# Patient Record
Sex: MALE | Race: WHITE | Employment: OTHER | URBAN - METROPOLITAN AREA
[De-identification: names, ages, dates, MRNs, and addresses within clinical notes are randomized per-mention and may not be internally consistent; named-entity substitution may affect disease eponyms.]

---

## 2019-02-18 ENCOUNTER — HOSPITAL ENCOUNTER (EMERGENCY)
Age: 84
Discharge: HOME | End: 2019-02-19
Payer: MEDICARE

## 2019-02-18 ENCOUNTER — APPOINTMENT (OUTPATIENT)
Dept: GENERAL RADIOLOGY | Age: 84
End: 2019-02-18
Payer: MEDICARE

## 2019-02-18 DIAGNOSIS — I15.9 SECONDARY HYPERTENSION: Primary | ICD-10-CM

## 2019-02-18 LAB
A/G RATIO: 1.6 (ref 1.1–2.2)
ALBUMIN SERPL-MCNC: 4.1 G/DL (ref 3.4–5)
ALP BLD-CCNC: 60 U/L (ref 40–129)
ALT SERPL-CCNC: 12 U/L (ref 10–40)
ANION GAP SERPL CALCULATED.3IONS-SCNC: 11 MMOL/L (ref 3–16)
AST SERPL-CCNC: 18 U/L (ref 15–37)
BASOPHILS ABSOLUTE: 0 K/UL (ref 0–0.2)
BASOPHILS RELATIVE PERCENT: 0.4 %
BILIRUB SERPL-MCNC: 1.4 MG/DL (ref 0–1)
BILIRUBIN URINE: NEGATIVE
BLOOD, URINE: NEGATIVE
BUN BLDV-MCNC: 17 MG/DL (ref 7–20)
CALCIUM SERPL-MCNC: 9.1 MG/DL (ref 8.3–10.6)
CHLORIDE BLD-SCNC: 98 MMOL/L (ref 99–110)
CLARITY: CLEAR
CO2: 27 MMOL/L (ref 21–32)
COLOR: YELLOW
CREAT SERPL-MCNC: 1.3 MG/DL (ref 0.8–1.3)
EOSINOPHILS ABSOLUTE: 0 K/UL (ref 0–0.6)
EOSINOPHILS RELATIVE PERCENT: 0.7 %
GFR AFRICAN AMERICAN: >60
GFR NON-AFRICAN AMERICAN: 52
GLOBULIN: 2.5 G/DL
GLUCOSE BLD-MCNC: 108 MG/DL (ref 70–99)
GLUCOSE URINE: NEGATIVE MG/DL
HCT VFR BLD CALC: 34.6 % (ref 40.5–52.5)
HEMOGLOBIN: 11.7 G/DL (ref 13.5–17.5)
KETONES, URINE: NEGATIVE MG/DL
LEUKOCYTE ESTERASE, URINE: NEGATIVE
LYMPHOCYTES ABSOLUTE: 1.7 K/UL (ref 1–5.1)
LYMPHOCYTES RELATIVE PERCENT: 25.9 %
MCH RBC QN AUTO: 32.5 PG (ref 26–34)
MCHC RBC AUTO-ENTMCNC: 33.7 G/DL (ref 31–36)
MCV RBC AUTO: 96.6 FL (ref 80–100)
MICROSCOPIC EXAMINATION: NORMAL
MONOCYTES ABSOLUTE: 0.6 K/UL (ref 0–1.3)
MONOCYTES RELATIVE PERCENT: 9.3 %
NEUTROPHILS ABSOLUTE: 4.1 K/UL (ref 1.7–7.7)
NEUTROPHILS RELATIVE PERCENT: 63.7 %
NITRITE, URINE: NEGATIVE
PDW BLD-RTO: 14.8 % (ref 12.4–15.4)
PH UA: 5.5
PLATELET # BLD: 133 K/UL (ref 135–450)
PMV BLD AUTO: 9 FL (ref 5–10.5)
POTASSIUM SERPL-SCNC: 3.9 MMOL/L (ref 3.5–5.1)
PROTEIN UA: NEGATIVE MG/DL
RBC # BLD: 3.58 M/UL (ref 4.2–5.9)
SODIUM BLD-SCNC: 136 MMOL/L (ref 136–145)
SPECIFIC GRAVITY UA: <=1.005
TOTAL PROTEIN: 6.6 G/DL (ref 6.4–8.2)
TROPONIN: <0.01 NG/ML
URINE TYPE: NORMAL
UROBILINOGEN, URINE: 0.2 E.U./DL
WBC # BLD: 6.4 K/UL (ref 4–11)

## 2019-02-18 PROCEDURE — 71046 X-RAY EXAM CHEST 2 VIEWS: CPT

## 2019-02-18 PROCEDURE — 85025 COMPLETE CBC W/AUTO DIFF WBC: CPT

## 2019-02-18 PROCEDURE — 99283 EMERGENCY DEPT VISIT LOW MDM: CPT

## 2019-02-18 PROCEDURE — 80053 COMPREHEN METABOLIC PANEL: CPT

## 2019-02-18 PROCEDURE — 84484 ASSAY OF TROPONIN QUANT: CPT

## 2019-02-18 PROCEDURE — 93005 ELECTROCARDIOGRAM TRACING: CPT | Performed by: PHYSICIAN ASSISTANT

## 2019-02-18 PROCEDURE — 81003 URINALYSIS AUTO W/O SCOPE: CPT

## 2019-02-18 RX ORDER — HYDRALAZINE HYDROCHLORIDE 50 MG/1
100 TABLET, FILM COATED ORAL 3 TIMES DAILY
COMMUNITY

## 2019-02-18 RX ORDER — METOPROLOL TARTRATE 50 MG/1
50 TABLET, FILM COATED ORAL 2 TIMES DAILY
COMMUNITY

## 2019-02-18 RX ORDER — ATORVASTATIN CALCIUM 80 MG/1
80 TABLET, FILM COATED ORAL DAILY
COMMUNITY

## 2019-02-18 RX ORDER — LISINOPRIL 20 MG/1
20 TABLET ORAL DAILY
COMMUNITY

## 2019-02-18 RX ORDER — TAMSULOSIN HYDROCHLORIDE 0.4 MG/1
0.4 CAPSULE ORAL DAILY
COMMUNITY

## 2019-02-18 ASSESSMENT — PAIN DESCRIPTION - PAIN TYPE: TYPE: ACUTE PAIN

## 2019-02-18 ASSESSMENT — PAIN SCALES - GENERAL: PAINLEVEL_OUTOF10: 2

## 2019-02-18 ASSESSMENT — PAIN DESCRIPTION - LOCATION: LOCATION: HEAD

## 2019-02-19 VITALS
HEIGHT: 72 IN | DIASTOLIC BLOOD PRESSURE: 71 MMHG | WEIGHT: 164 LBS | HEART RATE: 64 BPM | SYSTOLIC BLOOD PRESSURE: 164 MMHG | TEMPERATURE: 97.8 F | BODY MASS INDEX: 22.21 KG/M2 | RESPIRATION RATE: 18 BRPM | OXYGEN SATURATION: 95 %

## 2019-02-19 LAB
EKG ATRIAL RATE: 61 BPM
EKG DIAGNOSIS: NORMAL
EKG P AXIS: 76 DEGREES
EKG P-R INTERVAL: 168 MS
EKG Q-T INTERVAL: 482 MS
EKG QRS DURATION: 152 MS
EKG QTC CALCULATION (BAZETT): 485 MS
EKG R AXIS: 70 DEGREES
EKG T AXIS: 25 DEGREES
EKG VENTRICULAR RATE: 61 BPM

## 2019-02-19 PROCEDURE — 93010 ELECTROCARDIOGRAM REPORT: CPT | Performed by: INTERNAL MEDICINE

## 2020-05-07 ENCOUNTER — HOSPITAL ENCOUNTER (EMERGENCY)
Age: 85
Discharge: HOME | End: 2020-05-07
Attending: EMERGENCY MEDICINE
Payer: MEDICARE

## 2020-05-07 VITALS
OXYGEN SATURATION: 96 % | SYSTOLIC BLOOD PRESSURE: 208 MMHG | RESPIRATION RATE: 16 BRPM | HEIGHT: 72 IN | WEIGHT: 164 LBS | BODY MASS INDEX: 22.21 KG/M2 | DIASTOLIC BLOOD PRESSURE: 89 MMHG | TEMPERATURE: 97.8 F | HEART RATE: 72 BPM

## 2020-05-07 LAB
BILIRUBIN URINE: NEGATIVE
BLOOD, URINE: NEGATIVE
CLARITY: CLEAR
COLOR: YELLOW
GLUCOSE URINE: NEGATIVE MG/DL
HYALINE CASTS: ABNORMAL /LPF (ref 0–2)
KETONES, URINE: NEGATIVE MG/DL
LEUKOCYTE ESTERASE, URINE: NEGATIVE
MICROSCOPIC EXAMINATION: YES
NITRITE, URINE: NEGATIVE
PH UA: 6.5 (ref 5–8)
PROTEIN UA: ABNORMAL MG/DL
RBC UA: ABNORMAL /HPF (ref 0–4)
SPECIFIC GRAVITY UA: 1.01 (ref 1–1.03)
URINE REFLEX TO CULTURE: ABNORMAL
URINE TYPE: ABNORMAL
UROBILINOGEN, URINE: 0.2 E.U./DL
WBC UA: ABNORMAL /HPF (ref 0–5)

## 2020-05-07 PROCEDURE — 81001 URINALYSIS AUTO W/SCOPE: CPT

## 2020-05-07 PROCEDURE — 51702 INSERT TEMP BLADDER CATH: CPT

## 2020-05-07 PROCEDURE — 51798 US URINE CAPACITY MEASURE: CPT

## 2020-05-07 PROCEDURE — 99283 EMERGENCY DEPT VISIT LOW MDM: CPT

## 2020-05-07 ASSESSMENT — PAIN DESCRIPTION - PAIN TYPE: TYPE: ACUTE PAIN

## 2020-05-07 ASSESSMENT — PAIN SCALES - GENERAL: PAINLEVEL_OUTOF10: 10

## 2020-05-07 ASSESSMENT — PAIN DESCRIPTION - ORIENTATION: ORIENTATION: LOWER

## 2020-05-07 ASSESSMENT — PAIN DESCRIPTION - LOCATION: LOCATION: ABDOMEN

## 2020-05-07 NOTE — ED NOTES
--Patient provided with discharge instructions. Explained leg bag to daughter and patient. Both verbalized understanding of care. --Instructions, and follow-up appointments reviewed with patient/family. No further questions or needs at this time. --Vital signs and patient stable upon discharge. --Patient ambulatory to [facility] with daughter.          Kelsey Brigette, [address] [place] [address]  05/07/20 0960

## 2020-05-07 NOTE — ED NOTES
Completed patient ferrell catheter using sterile techniques. Patient tolerated well. Instructed patient and patients daughter on how to use leg bag for home care. Verbalized understanding.      Marcus Delcid  05/07/20 [id number]

## 2020-05-09 NOTE — ED PROVIDER NOTES
Physically abused: Not on file     Forced sexual activity: Not on file   Other Topics Concern    Not on file   Social History Narrative    Not on file     No current facility-administered medications for this encounter. Current Outpatient Medications   Medication Sig Dispense Refill    metoprolol tartrate (LOPRESSOR) 50 MG tablet Take 50 mg by mouth 2 times daily       lisinopril (PRINIVIL;ZESTRIL) 20 MG tablet Take 20 mg by mouth daily      hydrALAZINE (APRESOLINE) 50 MG tablet Take 100 mg by mouth 3 times daily       aspirin 81 MG tablet Take 81 mg by mouth daily      tamsulosin (FLOMAX) 0.4 MG capsule Take 0.4 mg by mouth daily      atorvastatin (LIPITOR) 80 MG tablet Take 80 mg by mouth daily       No Known Allergies    REVIEW OF SYSTEMS  10 systems reviewed, pertinent positives per HPI otherwise noted to be negative. PHYSICAL EXAM  BP (!) 208/89   Pulse 72   Temp 97.8 °F (36.6 °C) (Oral)   Resp 16   Ht 6' (1.829 m)   Wt 164 lb (74.4 kg)   SpO2 96%   BMI 22.24 kg/m²   GENERAL APPEARANCE: Awake and alert. Cooperative. No acute distress. HEAD: Normocephalic. Atraumatic. EYES: PERRL. EOM's grossly intact. ENT: Mucous membranes are moist.   NECK: Supple. HEART: RRR. CHEST/LUNGS: Chest atraumatic, nontender, respirations unlabored. CTAB. Good air exchange. Speaking comfortably in full sentences. BACK: No midline spinal tenderness or step-off. ABDOMEN: Soft. Non-distended. Non-tender. No guarding or rebound. Normal bowel sounds. EXTREMITIES: No peripheral edema. Moves all extremities equally. All extremities neurovascularly intact. SKIN: Warm and dry. No acute rashes. NEUROLOGICAL: Alert and oriented. CN 2-12 intact, No gross facial drooping. Strength 5/5, sensation intact. Normal coordination. Gait normal.   PSYCHIATRIC: Normal mood and affect. LABS  I have reviewed all labs for this visit.    Results for orders placed or performed during the hospital encounter of 05/07/20

## 2024-09-06 ENCOUNTER — HOSPITAL ENCOUNTER (EMERGENCY)
Age: 89
Discharge: HOME | DRG: 312 | End: 2024-09-07
Payer: OTHER GOVERNMENT

## 2024-09-06 DIAGNOSIS — R33.9 URINARY RETENTION: Primary | ICD-10-CM

## 2024-09-06 PROCEDURE — 99284 EMERGENCY DEPT VISIT MOD MDM: CPT

## 2024-09-06 PROCEDURE — 51702 INSERT TEMP BLADDER CATH: CPT

## 2024-09-06 ASSESSMENT — PAIN - FUNCTIONAL ASSESSMENT: PAIN_FUNCTIONAL_ASSESSMENT: NONE - DENIES PAIN

## 2024-09-07 VITALS
DIASTOLIC BLOOD PRESSURE: 91 MMHG | TEMPERATURE: 98 F | RESPIRATION RATE: 16 BRPM | HEART RATE: 70 BPM | WEIGHT: 150 LBS | OXYGEN SATURATION: 98 % | BODY MASS INDEX: 20.34 KG/M2 | SYSTOLIC BLOOD PRESSURE: 188 MMHG

## 2024-09-07 LAB
ALBUMIN SERPL-MCNC: 3.8 G/DL (ref 3.4–5)
ALBUMIN/GLOB SERPL: 1.3 {RATIO} (ref 1.1–2.2)
ALP SERPL-CCNC: 111 U/L (ref 40–129)
ALT SERPL-CCNC: 48 U/L (ref 10–40)
ANION GAP SERPL CALCULATED.3IONS-SCNC: 12 MMOL/L (ref 3–16)
AST SERPL-CCNC: 56 U/L (ref 15–37)
BACTERIA URNS QL MICRO: ABNORMAL /HPF
BASOPHILS # BLD: 0 K/UL (ref 0–0.2)
BASOPHILS NFR BLD: 0.5 %
BILIRUB SERPL-MCNC: 0.8 MG/DL (ref 0–1)
BILIRUB UR QL STRIP.AUTO: NEGATIVE
BUN SERPL-MCNC: 44 MG/DL (ref 7–20)
CALCIUM SERPL-MCNC: 9 MG/DL (ref 8.3–10.6)
CHLORIDE SERPL-SCNC: 105 MMOL/L (ref 99–110)
CLARITY UR: CLEAR
CO2 SERPL-SCNC: 22 MMOL/L (ref 21–32)
COLOR UR: YELLOW
CREAT SERPL-MCNC: 1.7 MG/DL (ref 0.8–1.3)
DEPRECATED RDW RBC AUTO: 16.5 % (ref 12.4–15.4)
EOSINOPHIL # BLD: 0.1 K/UL (ref 0–0.6)
EOSINOPHIL NFR BLD: 2.1 %
EPI CELLS #/AREA URNS HPF: ABNORMAL /HPF (ref 0–5)
GFR SERPLBLD CREATININE-BSD FMLA CKD-EPI: 37 ML/MIN/{1.73_M2}
GLUCOSE SERPL-MCNC: 86 MG/DL (ref 70–99)
GLUCOSE UR STRIP.AUTO-MCNC: NEGATIVE MG/DL
HCT VFR BLD AUTO: 33 % (ref 40.5–52.5)
HGB BLD-MCNC: 10.9 G/DL (ref 13.5–17.5)
HGB UR QL STRIP.AUTO: NEGATIVE
KETONES UR STRIP.AUTO-MCNC: NEGATIVE MG/DL
LEUKOCYTE ESTERASE UR QL STRIP.AUTO: NEGATIVE
LYMPHOCYTES # BLD: 1.5 K/UL (ref 1–5.1)
LYMPHOCYTES NFR BLD: 24.5 %
MCH RBC QN AUTO: 33.1 PG (ref 26–34)
MCHC RBC AUTO-ENTMCNC: 32.9 G/DL (ref 31–36)
MCV RBC AUTO: 100.4 FL (ref 80–100)
MONOCYTES # BLD: 0.6 K/UL (ref 0–1.3)
MONOCYTES NFR BLD: 9.9 %
NEUTROPHILS # BLD: 4 K/UL (ref 1.7–7.7)
NEUTROPHILS NFR BLD: 63 %
NITRITE UR QL STRIP.AUTO: NEGATIVE
PH UR STRIP.AUTO: 6 [PH] (ref 5–8)
PLATELET # BLD AUTO: 127 K/UL (ref 135–450)
PMV BLD AUTO: 9.2 FL (ref 5–10.5)
POTASSIUM SERPL-SCNC: 4.4 MMOL/L (ref 3.5–5.1)
PROT SERPL-MCNC: 6.7 G/DL (ref 6.4–8.2)
PROT UR STRIP.AUTO-MCNC: ABNORMAL MG/DL
RBC # BLD AUTO: 3.28 M/UL (ref 4.2–5.9)
RBC #/AREA URNS HPF: ABNORMAL /HPF (ref 0–4)
RENAL EPI CELLS #/AREA UR COMP ASSIST: ABNORMAL /HPF (ref 0–1)
SODIUM SERPL-SCNC: 139 MMOL/L (ref 136–145)
SP GR UR STRIP.AUTO: 1.01 (ref 1–1.03)
UA DIPSTICK W REFLEX MICRO PNL UR: YES
URN SPEC COLLECT METH UR: ABNORMAL
UROBILINOGEN UR STRIP-ACNC: 0.2 E.U./DL
WBC # BLD AUTO: 6.3 K/UL (ref 4–11)
WBC #/AREA URNS HPF: ABNORMAL /HPF (ref 0–5)

## 2024-09-07 PROCEDURE — 85025 COMPLETE CBC W/AUTO DIFF WBC: CPT

## 2024-09-07 PROCEDURE — 80053 COMPREHEN METABOLIC PANEL: CPT

## 2024-09-07 PROCEDURE — 2580000003 HC RX 258: Performed by: NURSE PRACTITIONER

## 2024-09-07 PROCEDURE — 81001 URINALYSIS AUTO W/SCOPE: CPT

## 2024-09-07 RX ORDER — 0.9 % SODIUM CHLORIDE 0.9 %
1000 INTRAVENOUS SOLUTION INTRAVENOUS ONCE
Status: COMPLETED | OUTPATIENT
Start: 2024-09-07 | End: 2024-09-07

## 2024-09-07 RX ADMIN — SODIUM CHLORIDE 1000 ML: 9 INJECTION, SOLUTION INTRAVENOUS at 00:45

## 2024-09-07 ASSESSMENT — LIFESTYLE VARIABLES
HOW MANY STANDARD DRINKS CONTAINING ALCOHOL DO YOU HAVE ON A TYPICAL DAY: PATIENT DOES NOT DRINK
HOW OFTEN DO YOU HAVE A DRINK CONTAINING ALCOHOL: NEVER

## 2024-09-08 ENCOUNTER — HOSPITAL ENCOUNTER (INPATIENT)
Age: 89
LOS: 9 days | Discharge: HOME | DRG: 312 | End: 2024-09-17
Attending: EMERGENCY MEDICINE | Admitting: INTERNAL MEDICINE
Payer: OTHER GOVERNMENT

## 2024-09-08 DIAGNOSIS — I48.91 ATRIAL FIBRILLATION, UNSPECIFIED TYPE (HCC): ICD-10-CM

## 2024-09-08 DIAGNOSIS — N28.9 RENAL INSUFFICIENCY: ICD-10-CM

## 2024-09-08 DIAGNOSIS — I95.9 HYPOTENSION, UNSPECIFIED HYPOTENSION TYPE: Primary | ICD-10-CM

## 2024-09-08 LAB
ANION GAP SERPL CALCULATED.3IONS-SCNC: 12 MMOL/L (ref 3–16)
BASOPHILS # BLD: 0 K/UL (ref 0–0.2)
BASOPHILS NFR BLD: 0.5 %
BILIRUB UR QL STRIP.AUTO: NEGATIVE
BUN SERPL-MCNC: 48 MG/DL (ref 7–20)
CALCIUM SERPL-MCNC: 8.8 MG/DL (ref 8.3–10.6)
CHLORIDE SERPL-SCNC: 105 MMOL/L (ref 99–110)
CLARITY UR: CLEAR
CO2 SERPL-SCNC: 22 MMOL/L (ref 21–32)
COLOR UR: YELLOW
CREAT SERPL-MCNC: 2 MG/DL (ref 0.8–1.3)
DEPRECATED RDW RBC AUTO: 16.7 % (ref 12.4–15.4)
EKG DIAGNOSIS: NORMAL
EKG Q-T INTERVAL: 516 MS
EKG QRS DURATION: 150 MS
EKG QTC CALCULATION (BAZETT): 484 MS
EKG R AXIS: 87 DEGREES
EKG T AXIS: 5 DEGREES
EKG VENTRICULAR RATE: 53 BPM
EOSINOPHIL # BLD: 0.1 K/UL (ref 0–0.6)
EOSINOPHIL NFR BLD: 0.9 %
EPI CELLS #/AREA URNS HPF: ABNORMAL /HPF (ref 0–5)
GFR SERPLBLD CREATININE-BSD FMLA CKD-EPI: 30 ML/MIN/{1.73_M2}
GLUCOSE SERPL-MCNC: 128 MG/DL (ref 70–99)
GLUCOSE UR STRIP.AUTO-MCNC: NEGATIVE MG/DL
HCT VFR BLD AUTO: 30.5 % (ref 40.5–52.5)
HGB BLD-MCNC: 10.2 G/DL (ref 13.5–17.5)
HGB UR QL STRIP.AUTO: ABNORMAL
KETONES UR STRIP.AUTO-MCNC: NEGATIVE MG/DL
LEUKOCYTE ESTERASE UR QL STRIP.AUTO: ABNORMAL
LYMPHOCYTES # BLD: 0.8 K/UL (ref 1–5.1)
LYMPHOCYTES NFR BLD: 11.1 %
MCH RBC QN AUTO: 33.4 PG (ref 26–34)
MCHC RBC AUTO-ENTMCNC: 33.4 G/DL (ref 31–36)
MCV RBC AUTO: 99.8 FL (ref 80–100)
MONOCYTES # BLD: 0.5 K/UL (ref 0–1.3)
MONOCYTES NFR BLD: 6.8 %
NEUTROPHILS # BLD: 6.1 K/UL (ref 1.7–7.7)
NEUTROPHILS NFR BLD: 80.7 %
NITRITE UR QL STRIP.AUTO: NEGATIVE
PH UR STRIP.AUTO: 6 [PH] (ref 5–8)
PLATELET # BLD AUTO: 125 K/UL (ref 135–450)
PMV BLD AUTO: 9.3 FL (ref 5–10.5)
POTASSIUM SERPL-SCNC: 4.6 MMOL/L (ref 3.5–5.1)
PROT UR STRIP.AUTO-MCNC: 30 MG/DL
RBC # BLD AUTO: 3.06 M/UL (ref 4.2–5.9)
RBC #/AREA URNS HPF: ABNORMAL /HPF (ref 0–4)
SODIUM SERPL-SCNC: 139 MMOL/L (ref 136–145)
SP GR UR STRIP.AUTO: 1.01 (ref 1–1.03)
UA DIPSTICK W REFLEX MICRO PNL UR: YES
URN SPEC COLLECT METH UR: ABNORMAL
UROBILINOGEN UR STRIP-ACNC: 0.2 E.U./DL
WBC # BLD AUTO: 7.6 K/UL (ref 4–11)
WBC #/AREA URNS HPF: ABNORMAL /HPF (ref 0–5)

## 2024-09-08 PROCEDURE — 99285 EMERGENCY DEPT VISIT HI MDM: CPT

## 2024-09-08 PROCEDURE — 6370000000 HC RX 637 (ALT 250 FOR IP): Performed by: INTERNAL MEDICINE

## 2024-09-08 PROCEDURE — 2580000003 HC RX 258: Performed by: EMERGENCY MEDICINE

## 2024-09-08 PROCEDURE — 80048 BASIC METABOLIC PNL TOTAL CA: CPT

## 2024-09-08 PROCEDURE — 2060000000 HC ICU INTERMEDIATE R&B

## 2024-09-08 PROCEDURE — 93005 ELECTROCARDIOGRAM TRACING: CPT | Performed by: EMERGENCY MEDICINE

## 2024-09-08 PROCEDURE — 97166 OT EVAL MOD COMPLEX 45 MIN: CPT

## 2024-09-08 PROCEDURE — 6360000002 HC RX W HCPCS: Performed by: INTERNAL MEDICINE

## 2024-09-08 PROCEDURE — 81001 URINALYSIS AUTO W/SCOPE: CPT

## 2024-09-08 PROCEDURE — 93010 ELECTROCARDIOGRAM REPORT: CPT | Performed by: INTERNAL MEDICINE

## 2024-09-08 PROCEDURE — 97530 THERAPEUTIC ACTIVITIES: CPT

## 2024-09-08 PROCEDURE — 85025 COMPLETE CBC W/AUTO DIFF WBC: CPT

## 2024-09-08 PROCEDURE — 2580000003 HC RX 258: Performed by: INTERNAL MEDICINE

## 2024-09-08 PROCEDURE — 97162 PT EVAL MOD COMPLEX 30 MIN: CPT

## 2024-09-08 RX ORDER — POLYETHYLENE GLYCOL 3350 17 G/17G
17 POWDER, FOR SOLUTION ORAL DAILY PRN
Status: DISCONTINUED | OUTPATIENT
Start: 2024-09-08 | End: 2024-09-17 | Stop reason: HOSPADM

## 2024-09-08 RX ORDER — SODIUM CHLORIDE 0.9 % (FLUSH) 0.9 %
5-40 SYRINGE (ML) INJECTION PRN
Status: DISCONTINUED | OUTPATIENT
Start: 2024-09-08 | End: 2024-09-17 | Stop reason: HOSPADM

## 2024-09-08 RX ORDER — ACETAMINOPHEN 325 MG/1
650 TABLET ORAL EVERY 6 HOURS PRN
Status: DISCONTINUED | OUTPATIENT
Start: 2024-09-08 | End: 2024-09-17 | Stop reason: HOSPADM

## 2024-09-08 RX ORDER — TAMSULOSIN HYDROCHLORIDE 0.4 MG/1
0.4 CAPSULE ORAL DAILY
Status: DISCONTINUED | OUTPATIENT
Start: 2024-09-08 | End: 2024-09-17 | Stop reason: HOSPADM

## 2024-09-08 RX ORDER — 0.9 % SODIUM CHLORIDE 0.9 %
500 INTRAVENOUS SOLUTION INTRAVENOUS ONCE
Status: COMPLETED | OUTPATIENT
Start: 2024-09-08 | End: 2024-09-08

## 2024-09-08 RX ORDER — SODIUM CHLORIDE 0.9 % (FLUSH) 0.9 %
5-40 SYRINGE (ML) INJECTION EVERY 12 HOURS SCHEDULED
Status: DISCONTINUED | OUTPATIENT
Start: 2024-09-08 | End: 2024-09-17 | Stop reason: HOSPADM

## 2024-09-08 RX ORDER — ASPIRIN 81 MG/1
81 TABLET ORAL DAILY
Status: DISCONTINUED | OUTPATIENT
Start: 2024-09-08 | End: 2024-09-17 | Stop reason: HOSPADM

## 2024-09-08 RX ORDER — SODIUM CHLORIDE 9 MG/ML
INJECTION, SOLUTION INTRAVENOUS CONTINUOUS
Status: DISCONTINUED | OUTPATIENT
Start: 2024-09-08 | End: 2024-09-09

## 2024-09-08 RX ORDER — ONDANSETRON 4 MG/1
4 TABLET, ORALLY DISINTEGRATING ORAL EVERY 8 HOURS PRN
Status: DISCONTINUED | OUTPATIENT
Start: 2024-09-08 | End: 2024-09-17 | Stop reason: HOSPADM

## 2024-09-08 RX ORDER — ATORVASTATIN CALCIUM 80 MG/1
80 TABLET, FILM COATED ORAL DAILY
Status: DISCONTINUED | OUTPATIENT
Start: 2024-09-08 | End: 2024-09-17 | Stop reason: HOSPADM

## 2024-09-08 RX ORDER — ACETAMINOPHEN 650 MG/1
650 SUPPOSITORY RECTAL EVERY 6 HOURS PRN
Status: DISCONTINUED | OUTPATIENT
Start: 2024-09-08 | End: 2024-09-17 | Stop reason: HOSPADM

## 2024-09-08 RX ORDER — SODIUM CHLORIDE 9 MG/ML
INJECTION, SOLUTION INTRAVENOUS PRN
Status: DISCONTINUED | OUTPATIENT
Start: 2024-09-08 | End: 2024-09-17 | Stop reason: HOSPADM

## 2024-09-08 RX ORDER — ENOXAPARIN SODIUM 100 MG/ML
30 INJECTION SUBCUTANEOUS DAILY
Status: DISCONTINUED | OUTPATIENT
Start: 2024-09-08 | End: 2024-09-10

## 2024-09-08 RX ORDER — ONDANSETRON 2 MG/ML
4 INJECTION INTRAMUSCULAR; INTRAVENOUS EVERY 6 HOURS PRN
Status: DISCONTINUED | OUTPATIENT
Start: 2024-09-08 | End: 2024-09-17 | Stop reason: HOSPADM

## 2024-09-08 RX ADMIN — SODIUM CHLORIDE: 9 INJECTION, SOLUTION INTRAVENOUS at 12:11

## 2024-09-08 RX ADMIN — ATORVASTATIN CALCIUM 80 MG: 80 TABLET, FILM COATED ORAL at 09:21

## 2024-09-08 RX ADMIN — SODIUM CHLORIDE 500 ML: 9 INJECTION, SOLUTION INTRAVENOUS at 04:01

## 2024-09-08 RX ADMIN — TAMSULOSIN HYDROCHLORIDE 0.4 MG: 0.4 CAPSULE ORAL at 09:21

## 2024-09-08 RX ADMIN — ASPIRIN 81 MG: 81 TABLET, COATED ORAL at 09:21

## 2024-09-08 RX ADMIN — ENOXAPARIN SODIUM 30 MG: 100 INJECTION SUBCUTANEOUS at 09:21

## 2024-09-08 RX ADMIN — SODIUM CHLORIDE, PRESERVATIVE FREE 10 ML: 5 INJECTION INTRAVENOUS at 09:28

## 2024-09-08 ASSESSMENT — PAIN SCALES - GENERAL: PAINLEVEL_OUTOF10: 0

## 2024-09-08 ASSESSMENT — PAIN - FUNCTIONAL ASSESSMENT: PAIN_FUNCTIONAL_ASSESSMENT: 0-10

## 2024-09-08 NOTE — PROGRESS NOTES
Patient admitted to room 425 from ED.  Patient oriented to room, call light, bed rails, phone, lights and bathroom. Bed locked, in lowest position, side rails up 2/4, call light within reach. Leatha Palacios RN    Patient doesn't know home medications. Daughter will be here this morning with list. Leatha Palacios RN

## 2024-09-08 NOTE — H&P
Hospital Medicine History & Physical      Date of Admission: 9/8/2024    Date of Service:  Pt seen/examined on 9/8/2024     [x]Admitted to Inpatient with expected LOS greater than two midnights due to medical therapy.  []Placed in Observation status.    Chief Admission Complaint:  Hypotension, possible AURELIO    Presenting Admission History:      89 y.o. male who presented to [facility] with weakness difficulty managing Winslow catheter, hypotension and possible AURELIO.  PMHx significant for hypertension, benign prostate hypertrophy, hyperlipidemia.  Patient lives by himself at home and has been managing well till couple of days ago when he presented to the emergency room for urinary retention required Winslow catheter placement patient was sent back home with a plan to follow-up with PCP as an outpatient and also call urology.  At that time his creatinine was mildly elevated 1.7 but the last creatinine was from 2019 which was 1.3.  Patient went home was not able to manage well with his Winslow catheter and had difficulty emptying the catheter and almost had a fall at home so decided to come to the emergency room for evaluation since he lives by himself.  Patient denied having any fever, chills, chest pain no nausea no vomiting, his creatinine was mildly elevated up to 2 from 1.7 on 9/6, However his blood pressure has been on the lower side at 91/51, also had episode of bradycardia 53, patient said he has been eating and drinking okay, he knew he is in the hospital but did not know the name, he knew it September but not the exact date.  However he knew his birthday and his current age.  He said he has 2 daughters who helps him usually and [facility] and has been improving with his strength.  Of note patient is on multiple medication at home including hydralazine Lopressor and lisinopril all may lower his blood pressure and heart rate.    Patient requested to be a full code said he has 2  We are committed to providing you the best care possible. If you receive a survey after visiting one of our offices, please take time to share your experience concerning your physician office visit. These surveys are confidential and no health information about you is shared. We are eager to improve for you and we are counting on your feedback to help make that happen. daily    Provider, MD Aleksandr   hydrALAZINE (APRESOLINE) 50 MG tablet Take 100 mg by mouth 3 times daily     Provider, MD Aleksandr   aspirin 81 MG tablet Take 81 mg by mouth daily    ProviderAleksandr MD   tamsulosin (FLOMAX) 0.4 MG capsule Take 0.4 mg by mouth daily    ProviderAleksandr MD   atorvastatin (LIPITOR) 80 MG tablet Take 80 mg by mouth daily    Provider, MD Aleksandr       Labs: Personally reviewed and interpreted for clinical significance.   Recent Labs     09/07/24  0000 09/08/24  0307   WBC 6.3 7.6   HGB 10.9* 10.2*   HCT 33.0* 30.5*   * 125*     Recent Labs     09/07/24  0000 09/08/24  0306    139   K 4.4 4.6    105   CO2 22 22   BUN 44* 48*   CREATININE 1.7* 2.0*   CALCIUM 9.0 8.8       Recent Labs     09/07/24  0000   AST 56*   ALT 48*   BILITOT 0.8   ALKPHOS 111          Mehul Parham MD

## 2024-09-08 NOTE — PROGRESS NOTES
Occupational Therapy  Facility/Department: [address]  Occupational Therapy Initial Assessment    Name: Javier De La Torre  : 1929  MRN: [id number]  Date of Service: 2024    Discharge Recommendations:  Subacute/Skilled Nursing Facility  Therapy discharge recommendations take into account each patient's current medical complexities and are subject to input/oversight from the patient's healthcare team.   Barriers to Home Discharge:     [x] Unable to transfer, ambulate, or propel wheelchair household distances without assist   [x] Limited available assist at home upon discharge      [x] Poor cognition/safety awareness for d/c to home alone       [x] Patient is at risk for falls due to: ferrell catheter     If pt is unable to be seen after this session, please let this note serve as discharge summary.  Please see case management note for discharge disposition.  Thank you.           Patient Diagnosis(es): The primary encounter diagnosis was Hypotension, unspecified hypotension type. A diagnosis of Renal insufficiency was also pertinent to this visit.  Past Medical History:  has a past medical history of Hyperlipidemia and Hypertension.  Past Surgical History:  has a past surgical history that includes Cardiac surgery (2016).           Assessment  Performance deficits / Impairments: Decreased functional mobility ;Decreased ADL status;Decreased balance;Decreased safe awareness  Assessment: pt from home alone, reports normally independent with BADL's & functional mobility with cane; readmitted for inability to care for self , especially ferrell catheter; now requiring min assist with bathroom mobility with RW for safety, decreased balance, dependent with LE self care; pt to benefit from skilled OT services;  REQUIRES OT FOLLOW-UP: Yes  Activity Tolerance  Activity Tolerance: Patient Tolerated treatment well  Activity Tolerance Comments: vitals stable on RA     Plan  Occupational Therapy Plan  Times Per Week: 3-5x/  week  Current Treatment Recommendations: Strengthening, Balance training, Functional mobility training, Safety education & training, Self-Care / ADL, Positioning    Restrictions  Restrictions/Precautions  Restrictions/Precautions: General Precautions, Fall Risk  Position Activity Restriction  Other position/activity restrictions: IV, tele, catheter,    Subjective  General  Chart Reviewed: Yes  Patient assessed for rehabilitation services?: Yes  Family / Caregiver Present: No  Referring Practitioner: Mehul Parham MD  Diagnosis: inablility to care for self especially ferrell catheter  General Comment  Comments: RN cleared pt for OT eval; pt awake in bed, agreeable to therapy  denies pain at rest.  Social/Functional History  Social/Functional History  Lives With: Alone (granddaughters assist PRN, but not availible for 24 hour.)  Type of Home: Apartment  Home Layout: One level  Home Access: Level entry  Bathroom Shower/Tub: Walk-in shower  Bathroom Toilet: Standard  Bathroom Equipment: Grab bars in shower, Grab bars around toilet  Home Equipment: Cane  Has the patient had two or more falls in the past year or any fall with injury in the past year?: Yes (fall at nursing home, fell at home as well.)  Homemaking Assistance: Needs assistance  Homemaking Responsibilities: Yes  Meal Prep Responsibility: Secondary (family prepares.  hets meals up on his own with microwave.)  Laundry Responsibility: Primary  Shopping Responsibility: No (family brings.)  Ambulation Assistance: Independent (with SPC.)  Transfer Assistance: Independent  Active : No  Patient's  Info: has a license doesnt drive.  Occupation: Retired  Type of Occupation: .    Objective  Temp: 97.8 °F (36.6 °C)  Pulse: 52  Heart Rate Source: Monitor  Respirations: 15  SpO2: 97 %  O2 Device: None (Room air)  BP: 123/66  MAP (Calculated): 85  BP Location: Right upper arm  BP Method: Automatic  Patient Position: Semi fowlers  Comment: HR 52

## 2024-09-08 NOTE — PLAN OF CARE
Pt takes metoprolol and tamsulosin twice per day. morning and bedtime. HR has been very pamela (dipped into the 30's a few times) so I'm not concerned about the metoprolol, however, pt is concerned about being able to take his tamsulosin again at bedtime.     Flomax is a once daily medication. They can increase the dose tomorrow if he is not having good response instead of doing low dose bid

## 2024-09-08 NOTE — PROGRESS NOTES
V2.0    [facility] [place]      Name:  Javier De La Torre /Age/Sex: 1929  (89 y.o. male)   MRN & CSN:  [id number] & [id number] Encounter Date/Time: 2024 8:18 AM EDT   Location:  0425/0425-01 PCP: Keyla Juan     Attending:Galdino Kidd MD       Hospital Day: 1    Assessment and Recommendations   Javier De La Torre is a 89 y.o. male with pmh of ypertension, benign prostate hypertrophy, hyperlipidemia.  who presents with Hypotension      Interval Events:   No acute events overnight.  Patient observed sitting upright eating breakfast in no distress.  Patient producing 300 mL of urine in Winslow catheter.  Reports low fluid intake in the days leading up to admission he denies any chest pain, palpitations, lightheadedness dizziness.         Plan:    Hypotension   -Admitted with blood pressures in the 90s/40s to 50s  -Home meds of lisinopril and Lopressor, hydralazine, tamsulosin  -Blood pressure stable this a.m.  Likely discharge tomorrow (), if he remains stable.      Asymptomatic bradycardia   -Lopressor on hold      Elevated creatinine with questionable acute kidney injury   -Creatinine increased from 1.7-2.0 overnight.  -Continue IV fluids    Acute urinary retention   -Patient urine output within normal limits during this admission    Hyperlipidemia   -Continue Lipitor 80 mg      Diet ADULT DIET; Regular   DVT Prophylaxis [x] Lovenox, []  Heparin, [] SCDs, [] Ambulation,  [] Eliquis, [] Xarelto  [] Coumadin   Code Status Full Code   Disposition From: Home  Expected Disposition: Home  Estimated Date of Discharge: 2024   patient requires continued admission due to    Surrogate Decision Maker/ POA           Subjective:     Chief Complaint:     Javier De La Torre is a 89 y.o. male who presents with hypotension    HPI:    89 y.o. male who presented to [facility] with weakness difficulty managing Winslow catheter, hypotension and possible AURELIO.  PMHx significant for hypertension, benign

## 2024-09-08 NOTE — ED PROVIDER NOTES
Emergency Department Provider Note  Location: [facility]  ED  9/8/2024     Patient Identification  Javier D eLa Torre is a 89 y.o. male    Chief Complaint  Issues with ADLs (Pt reports that he has trouble walking, emptying his bag and he is afraid he will fall and not be able to get up. )      Mode of Arrival  EMS    HPI  (History provided by patient)  This is a 89 y.o. male with a PMH significant for HTN, acute urinary retention status post Winslow catheter placement presented today for \" I cannot care for my catheter.\"  Patient lives alone in an apartment.  He was diagnosed with acute urinary retention and Winslow catheter from her ED less than 2 days ago.  Went home and tonight, he was trying to empty his bag when he felt like he was going to fall.  He ended up spilling the urine on his pants.  Patient is that he did not fall but afterwards he is afraid to get up.  He said he does not know how to care for the Winslow catheter and does not feel comfortable staying home.  He also does not want to go to nursing home.  He said he finally got nursing home after 6 weeks of rehab.  He denies abdominal pain.  He said he felt a little lightheaded today.  He denies room spinning sensation.  No chest pain or palpitations.  No shortness of breath.  He said he gets intermittent chronic aches and pain in his joints that is not new.    No other complaints, modifying factors or associated symptoms.      I have reviewed the following nursing documentation:  Allergies: No Known Allergies    Past medical history:  has a past medical history of Hyperlipidemia and Hypertension.    Past surgical history:  has a past surgical history that includes Cardiac surgery (2016).    Home medications:   Prior to Admission medications    Medication Sig Start Date End Date Taking? Authorizing Provider   metoprolol tartrate (LOPRESSOR) 50 MG tablet Take 50 mg by mouth 2 times daily     Provider, MD Aleksandr   lisinopril (PRINIVIL;ZESTRIL)

## 2024-09-08 NOTE — PLAN OF CARE
CMU notified me that pt HR was dipping into the mid to high 30's/low 40's. Writer checked on pt, pt asleep. Writer obtained vital signs, HR up to high 40's/low 50's. Notified Dr Kidd.   Pt is on beta blocker at home, we will continue to monitor on tele per Dr Kidd.

## 2024-09-08 NOTE — PROGRESS NOTES
Physical Therapy  Facility/Department: [facility]  Physical Therapy Initial Assessment    Name: Javier De La Torre  : 1929  MRN: [id number]  Date of Service: 2024    Discharge Recommendations:      PT Equipment Recommendations  Equipment Needed: No  Other: defer to next level of care.      Therapy discharge recommendations take into account each patient's current medical complexities and are subject to input/oversight from the patient's healthcare team.   Barriers to Home Discharge:   [] Steps to access home entry or bed/bath:   [x] Unable to transfer, ambulate, or propel wheelchair household distances without assist   [x] Unable to perform ADLs without assist   [x] Limited available assist at home upon discharge    [] Patient or family requests d/c to post-acute facility    [x] Poor cognition/safety awareness for d/c to home alone    [] Unable to maintain ordered weight bearing status    [] Patient with salient signs of long-standing immobility   [] Other: pt reports unable to care for self after catheter placement, pt at increased risk for falls.      Patient Diagnosis(es): The primary encounter diagnosis was Hypotension, unspecified hypotension type. A diagnosis of Renal insufficiency was also pertinent to this visit.  Past Medical History:  has a past medical history of Hyperlipidemia and Hypertension.  Past Surgical History:  has a past surgical history that includes Cardiac surgery (2016).    Assessment  Assessment: Pt seen in conjunction with OT to maximize pt safety and mobility and safely assess pt maximal level of mobility.  Pt presents to [facility] with primary diagnosis of hypotension as well as not being able to manage his catheter at home.  PTA pt lived at home alone with level entry, reports MOD IND with mobility with SPC.  Currently pt demonstrates grosly CGA for bed mobility but the pt did have one posterior LOB requiring MOD A to maintain balance, MIN A for functional transfers with RW although  rolling;Gait belt  Interventions: Verbal cues;Safety awareness training;Tactile cues;Manual cues  Base of Support: Widened  Speed/Alisson: Pace decreased (< 100 feet/min)  Step Length: Right shortened;Left shortened  Gait Abnormalities: Decreased step clearance;Trunk sway increased;Path deviations (pt unsteady on his feet with ambulation, path deviations and increased trunk sway noted.  has difficulty attending to cues for sequencing RW.)      Exercise Treatment: AP, QS, GS< HS 1X10 each in supine.     AM-PAC - Mobility    AM-PAC Basic Mobility - Inpatient   How much help is needed turning from your back to your side while in a flat bed without using bedrails?: A Little  How much help is needed moving from lying on your back to sitting on the side of a flat bed without using bedrails?: A Little  How much help is needed moving to and from a bed to a chair?: A Little  How much help is needed standing up from a chair using your arms?: A Little  How much help is needed walking in hospital room?: A Lot  How much help is needed climbing 3-5 steps with a railing?: Total  AM-PAC Inpatient Mobility Raw Score : 15  AM-PAC Inpatient T-Scale Score : 39.45  Mobility Inpatient CMS 0-100% Score: 57.7  Mobility Inpatient CMS G-Code Modifier : CK    Goals  Short Term Goals  Time Frame for Short Term Goals: 7 days (9/15) unless otherwise noted.  Short Term Goal 1: Pt will demonstrate CGA with bed mobility.  Short Term Goal 2: Pt will demonstrate CGA with functional transfers with LRAD with safe sequencing.  Short Term Goal 3: Pt will demonstrate CGA for ambulation up to 50' with LRAD and safe sequencing of AD.  Short Term Goal 4: Pt will participate in 4 different BLE exercises of 12-15 reps each to improve strength and endurance by 9/13.  Patient Goals   Patient Goals : \"I want to go home, I don't want to go to a nursing home unless i have to.\"     Education  Patient Education  Education Given To: Patient  Education Provided: Role

## 2024-09-08 NOTE — ED NOTES
Javier De La Torre is a 89 y.o. male admitted for  Principal Problem:    Hypotension  Resolved Problems:    * No resolved hospital problems. *  .   Patient Home via EMS transportation with   Chief Complaint   Patient presents with    Issues with ADLs     Pt reports that he has trouble walking, emptying his bag and he is afraid he will fall and not be able to get up.    .  Patient is alert and oriented x 4  Patient's baseline mobility: Baseline Mobility: Independent   Code Status: No Order   Cardiac Rhythm:       Is patient on baseline Oxygen: no how many Liters NA:   Abnormal Assessment Findings: Pt reports struggling with his ADLs since returning home with an indwelling catheter. He was noted to be bradycardic and slightly hypotensive. EKG reviewed by  Fluids administered. He was also noted to have a AURELIO on his labs.     Isolation: None      NIH Score:    C-SSRS: Risk of Suicide: No Risk  Bedside swallow:        Active LDA's:   Peripheral IV 09/08/24 Proximal;Right Forearm (Active)     Patient admitted with a ferrell: yes, yes  the ferrell is chronic was it exchanged:per pt last exchange was 09/06/24  Reason for ferrell: Urinary obstruction  Patient admitted with Central Line:  NA . PICC line placement confirmed: YES OR NO:[id number]}   Reason for Central line: NA  Was central line Inserted from an outside facility: no       Family/Caregiver Present no Any Concerns: no   Restraints no  Sitter no         Vitals: MEWS Score: 2    Vitals:    09/08/24 0238 09/08/24 0245 09/08/24 0327   BP: (!) 95/49 (!) 94/56 (!) 91/51   Pulse: 55 55 53   Resp: 16 13 18   Temp: 98.7 °F (37.1 °C)     TempSrc: Oral     SpO2: 95% 96% 92%   Weight: 68 kg (150 lb)     Height: 1.829 m (6')         Last documented pain score (0-10 scale) Pain Level: 0  Pain medication administered No.    Pertinent or High Risk Medications/Drips: No.    Pending Blood Product Administration: no    Abnormal labs:   Abnormal Labs Reviewed   URINALYSIS - Abnormal; Notable

## 2024-09-09 LAB
ALBUMIN SERPL-MCNC: 3.6 G/DL (ref 3.4–5)
ANION GAP SERPL CALCULATED.3IONS-SCNC: 11 MMOL/L (ref 3–16)
ANION GAP SERPL CALCULATED.3IONS-SCNC: 12 MMOL/L (ref 3–16)
BASOPHILS # BLD: 0 K/UL (ref 0–0.2)
BASOPHILS NFR BLD: 0.2 %
BUN SERPL-MCNC: 36 MG/DL (ref 7–20)
BUN SERPL-MCNC: 45 MG/DL (ref 7–20)
CALCIUM SERPL-MCNC: 9 MG/DL (ref 8.3–10.6)
CALCIUM SERPL-MCNC: 9 MG/DL (ref 8.3–10.6)
CHLORIDE SERPL-SCNC: 108 MMOL/L (ref 99–110)
CHLORIDE SERPL-SCNC: 109 MMOL/L (ref 99–110)
CO2 SERPL-SCNC: 20 MMOL/L (ref 21–32)
CO2 SERPL-SCNC: 22 MMOL/L (ref 21–32)
CREAT SERPL-MCNC: 1.2 MG/DL (ref 0.8–1.3)
CREAT SERPL-MCNC: 1.5 MG/DL (ref 0.8–1.3)
DEPRECATED RDW RBC AUTO: 17.2 % (ref 12.4–15.4)
EOSINOPHIL # BLD: 0.1 K/UL (ref 0–0.6)
EOSINOPHIL NFR BLD: 2.1 %
GFR SERPLBLD CREATININE-BSD FMLA CKD-EPI: 43 ML/MIN/{1.73_M2}
GFR SERPLBLD CREATININE-BSD FMLA CKD-EPI: 56 ML/MIN/{1.73_M2}
GLUCOSE SERPL-MCNC: 110 MG/DL (ref 70–99)
GLUCOSE SERPL-MCNC: 120 MG/DL (ref 70–99)
HCT VFR BLD AUTO: 33.7 % (ref 40.5–52.5)
HGB BLD-MCNC: 11.3 G/DL (ref 13.5–17.5)
LYMPHOCYTES # BLD: 1.3 K/UL (ref 1–5.1)
LYMPHOCYTES NFR BLD: 18.7 %
MCH RBC QN AUTO: 33.1 PG (ref 26–34)
MCHC RBC AUTO-ENTMCNC: 33.4 G/DL (ref 31–36)
MCV RBC AUTO: 99.1 FL (ref 80–100)
MONOCYTES # BLD: 0.6 K/UL (ref 0–1.3)
MONOCYTES NFR BLD: 9.2 %
NEUTROPHILS # BLD: 4.7 K/UL (ref 1.7–7.7)
NEUTROPHILS NFR BLD: 69.8 %
PHOSPHATE SERPL-MCNC: 2.9 MG/DL (ref 2.5–4.9)
PLATELET # BLD AUTO: 131 K/UL (ref 135–450)
PMV BLD AUTO: 9.5 FL (ref 5–10.5)
POTASSIUM SERPL-SCNC: 4 MMOL/L (ref 3.5–5.1)
POTASSIUM SERPL-SCNC: 4.1 MMOL/L (ref 3.5–5.1)
RBC # BLD AUTO: 3.41 M/UL (ref 4.2–5.9)
SODIUM SERPL-SCNC: 140 MMOL/L (ref 136–145)
SODIUM SERPL-SCNC: 142 MMOL/L (ref 136–145)
WBC # BLD AUTO: 6.7 K/UL (ref 4–11)

## 2024-09-09 PROCEDURE — 85025 COMPLETE CBC W/AUTO DIFF WBC: CPT

## 2024-09-09 PROCEDURE — 2000000000 HC ICU R&B

## 2024-09-09 PROCEDURE — 6370000000 HC RX 637 (ALT 250 FOR IP)

## 2024-09-09 PROCEDURE — 80069 RENAL FUNCTION PANEL: CPT

## 2024-09-09 PROCEDURE — 6360000002 HC RX W HCPCS: Performed by: INTERNAL MEDICINE

## 2024-09-09 PROCEDURE — 36415 COLL VENOUS BLD VENIPUNCTURE: CPT

## 2024-09-09 PROCEDURE — 2580000003 HC RX 258: Performed by: STUDENT IN AN ORGANIZED HEALTH CARE EDUCATION/TRAINING PROGRAM

## 2024-09-09 PROCEDURE — 6360000002 HC RX W HCPCS: Performed by: STUDENT IN AN ORGANIZED HEALTH CARE EDUCATION/TRAINING PROGRAM

## 2024-09-09 PROCEDURE — 6370000000 HC RX 637 (ALT 250 FOR IP): Performed by: INTERNAL MEDICINE

## 2024-09-09 PROCEDURE — 6360000002 HC RX W HCPCS: Performed by: NURSE PRACTITIONER

## 2024-09-09 RX ORDER — METOPROLOL TARTRATE 50 MG
50 TABLET ORAL 2 TIMES DAILY
Status: DISCONTINUED | OUTPATIENT
Start: 2024-09-09 | End: 2024-09-09

## 2024-09-09 RX ORDER — LISINOPRIL 20 MG/1
20 TABLET ORAL DAILY
Status: DISCONTINUED | OUTPATIENT
Start: 2024-09-09 | End: 2024-09-12

## 2024-09-09 RX ORDER — ATROPINE SULFATE 0.1 MG/ML
1 INJECTION INTRAVENOUS
Status: DISPENSED | OUTPATIENT
Start: 2024-09-09 | End: 2024-09-10

## 2024-09-09 RX ORDER — CLONIDINE HYDROCHLORIDE 0.1 MG/1
0.1 TABLET ORAL EVERY 4 HOURS PRN
Status: DISCONTINUED | OUTPATIENT
Start: 2024-09-09 | End: 2024-09-17 | Stop reason: HOSPADM

## 2024-09-09 RX ORDER — METOPROLOL TARTRATE 50 MG
50 TABLET ORAL 2 TIMES DAILY
Status: DISCONTINUED | OUTPATIENT
Start: 2024-09-09 | End: 2024-09-11

## 2024-09-09 RX ORDER — HYDRALAZINE HYDROCHLORIDE 20 MG/ML
10 INJECTION INTRAMUSCULAR; INTRAVENOUS EVERY 6 HOURS PRN
Status: DISCONTINUED | OUTPATIENT
Start: 2024-09-09 | End: 2024-09-17 | Stop reason: HOSPADM

## 2024-09-09 RX ADMIN — HYDRALAZINE HYDROCHLORIDE 10 MG: 20 INJECTION INTRAMUSCULAR; INTRAVENOUS at 16:13

## 2024-09-09 RX ADMIN — ENOXAPARIN SODIUM 30 MG: 100 INJECTION SUBCUTANEOUS at 09:37

## 2024-09-09 RX ADMIN — CLONIDINE HYDROCHLORIDE 0.1 MG: 0.1 TABLET ORAL at 17:11

## 2024-09-09 RX ADMIN — SODIUM CHLORIDE 5 MG/HR: 9 INJECTION, SOLUTION INTRAVENOUS at 19:48

## 2024-09-09 RX ADMIN — ASPIRIN 81 MG: 81 TABLET, COATED ORAL at 09:37

## 2024-09-09 RX ADMIN — ATORVASTATIN CALCIUM 80 MG: 80 TABLET, FILM COATED ORAL at 09:37

## 2024-09-09 RX ADMIN — TAMSULOSIN HYDROCHLORIDE 0.4 MG: 0.4 CAPSULE ORAL at 09:37

## 2024-09-09 RX ADMIN — HYDRALAZINE HYDROCHLORIDE 10 MG: 20 INJECTION INTRAMUSCULAR; INTRAVENOUS at 02:02

## 2024-09-09 RX ADMIN — LISINOPRIL 20 MG: 20 TABLET ORAL at 12:16

## 2024-09-09 RX ADMIN — METOPROLOL TARTRATE 50 MG: 50 TABLET, FILM COATED ORAL at 16:18

## 2024-09-09 ASSESSMENT — PAIN SCALES - GENERAL
PAINLEVEL_OUTOF10: 0

## 2024-09-09 NOTE — CARE COORDINATION
Patient confused . He has been very agitated and nurse gave PRN Hydralazine for BP of 193/90. Patient sinus. Patient has [facility] insurance. Attempted to discuss where he was at prior to admissions and he was unable to stay focused and answer questions appropriately. Unable to reach daughter phone listed as mobile is a non working number. Patient states he does not want to go back to rehab. He thinks he was at [facility]. Confirmed with Verito in admissions he was in fact there and discharged home a few weeks ago. Patient does not want to come back. He was there under Medicare because he is not service connected for SNF per Verito. Will keep trying to reach family to complete assessment.

## 2024-09-09 NOTE — PROGRESS NOTES
Occupational Therapy    Completed chart review. Upon arrival, pt stated \"I do not want to do anymore therapy. I do enough exercises at home.\" Attempted to educate pt on therapy's role in acute stay, however continued to decline any education and OOB mobility.    Will follow up as schedule allows and pt is agreeable.     Veena Trejo, OTR/L

## 2024-09-09 NOTE — ED PROVIDER NOTES
[facility]  ED  EMERGENCY DEPARTMENT ENCOUNTER        Pt Name: Javier De La Torre  MRN: [id number]  Birthdate 11/27/1929  Date of evaluation: 9/6/2024  Provider: GEENA Conner - DUSTY  PCP: Keyla Juan  Note Started: 6:09 PM EDT 9/9/24      JENNIFER. I have evaluated this patient.        CHIEF COMPLAINT       Chief Complaint   Patient presents with    Urinary Retention     States unable to urinate today, reports recent catheter placement for same complaint.        HISTORY OF PRESENT ILLNESS: 1 or more Elements     History From: the patient  Limitations to history : None    Javier De La Torre is a 89 y.o. male who presents to the ER today with complaints of urinary retention, states that he is having difficulty urinating he states that he feels like he has been going a lot and not emptying his bladder, states that it is somewhat painful when he urinates.  He is had catheter placements for the same issue in the past.  Denies any trauma, denies any fever.  He is here for further evaluation.    Nursing Notes were all reviewed and agreed with or any disagreements were addressed in the HPI.    REVIEW OF SYSTEMS :      Review of Systems    Positives and Pertinent negatives as per HPI.     SURGICAL HISTORY     Past Surgical History:   Procedure Laterality Date    CARDIAC SURGERY  2016    open heart       CURRENTMEDICATIONS       Discharge Medication List as of 9/7/2024  1:15 AM        CONTINUE these medications which have NOT CHANGED    Details   metoprolol tartrate (LOPRESSOR) 50 MG tablet Take 50 mg by mouth 2 times daily Historical Med      lisinopril (PRINIVIL;ZESTRIL) 20 MG tablet Take 20 mg by mouth dailyHistorical Med      hydrALAZINE (APRESOLINE) 50 MG tablet Take 100 mg by mouth 3 times daily Historical Med      aspirin 81 MG tablet Take 81 mg by mouth dailyHistorical Med      tamsulosin (FLOMAX) 0.4 MG capsule Take 0.4 mg by mouth dailyHistorical Med      atorvastatin (LIPITOR) 80 MG tablet

## 2024-09-09 NOTE — PROGRESS NOTES
4 Eyes Skin Assessment     NAME:  Javier De La Torre  YOB: 1929  MEDICAL RECORD NUMBER:  [id number]    The patient is being assessed for  Transfer to New Unit    I agree that at least one RN has performed a thorough Head to Toe Skin Assessment on the patient. ALL assessment sites listed below have been assessed.      Areas assessed by both nurses:    Head, Face, Ears, Shoulders, Back, Chest, Arms, Elbows, Hands, Sacrum. Buttock, Coccyx, Ischium, Legs. Feet and Heels, and Under Medical Devices         Does the Patient have a Wound? No noted wound(s)       Austin Prevention initiated by RN: Yes  Wound Care Orders initiated by RN: No    Pressure Injury (Stage 3,4, Unstageable, DTI, NWPT, and Complex wounds) if present, place Wound referral order by RN under : No    New Ostomies, if present place, Ostomy referral order under : No     Nurse 1 eSignature: Electronically signed by Belle Echavarria RN on 9/9/24 at 7:35 PM EDT    **SHARE this note so that the co-signing nurse can place an eSignature**    Nurse 2 eSignature: {Esignature:[id number]}

## 2024-09-09 NOTE — PROGRESS NOTES
Pt transferred to ICU, BP elevated to 190's, HR 70's, SR, afebrile, sp02 WNL on RA. Denies pain. Skin intact, no breakdown noted. CHG bath given. Winslow draining without difficulty. Bed alarm on. Pt oriented to room, bed control and call light explained. Belongings brought with pt upon transfer. Call light in reach. Will cont to monitor.

## 2024-09-09 NOTE — PROGRESS NOTES
Patient anxious throughout shift with frequent calls to RN requesting to verify if he is going to be sent back to prior rehab facility, stating he does not want to be sent back to that facility.    BP at 0000 193/90, hospitalist notified, order placed for PRN hydralazine. BP after hydralazine administered 159/72.    CMU notified RN patient HR dropped to 23 at 0100, Hospitalist notified, PRN order placed for Atropine at this time. Patient HR remains pamela at rest throughout the night between 40s-50s.

## 2024-09-09 NOTE — PROGRESS NOTES
V2.0    [facility] Progress Note      Name:  Javier De La Torre /Age/Sex: 1929  (89 y.o. male)   MRN & CSN:  [id number] & [id number] Encounter Date/Time: 2024 8:18 AM EDT   Location:  [facility]5/0425-01 PCP: Keyla Juan     Attending:Raoul Menendez MD       Hospital Day: 2    Assessment and Recommendations   Javier De La Torre is a 89 y.o. male with pmh of ypertension, benign prostate hypertrophy, hyperlipidemia.  who presents with Hypotension      Interval Events:   No acute events overnight.  Patient observed sitting upright eating breakfast in no distress. HTN meds held initally due to concern for hypotension. Observed with  SBP> 200  today. Restarted home metoprolol and lisinopril. PRN clonidine added.     Plan:   Hypertension   -Patient blood pressure increasing to the 200s today  -Restarted home lisinopril 20 mg, home metoprolol 50  -PRN clonidine  -HR has been stable   -Continuing to monitor blood pressure and symptoms for hypertensive emergency  -Monitor for bradycardia    Elevated creatinine with questionable acute kidney injury   -Creatinine now at 1.5, GFR increased to 43  -Will follow-up renal function panel    Acute urinary retention   -Patient urine output within normal limits during this admission    Hyperlipidemia   -Continue Lipitor 80 mg      Diet ADULT DIET; Regular   DVT Prophylaxis [x] Lovenox, []  Heparin, [] SCDs, [] Ambulation,  [] Eliquis, [] Xarelto  [] Coumadin   Code Status Full Code   Disposition From: Home  Expected Disposition: Home  Estimated Date of Discharge: 2024   patient requires continued admission due to    Surrogate Decision Maker/ POA           Subjective:     Chief Complaint:     Javier De La Torre is a 89 y.o. male who presents with hypotension    HPI:    89 y.o. male who presented to [facility] with weakness difficulty managing Winslow catheter, hypotension and possible AURELIO.  PMHx significant for hypertension, benign prostate hypertrophy, hyperlipidemia.  Patient  °C)    TempSrc:  Oral Oral    SpO2: 93% 95% 95%    Weight:       Height:             Physical Exam:      Physical Exam  Constitutional:       Appearance: He is normal weight.   HENT:      Head: Normocephalic and atraumatic.   Eyes:      Extraocular Movements: Extraocular movements intact.      Conjunctiva/sclera: Conjunctivae normal.      Pupils: Pupils are equal, round, and reactive to light.   Cardiovascular:      Rate and Rhythm: Bradycardia present.      Pulses: Normal pulses.      Heart sounds: Normal heart sounds.   Pulmonary:      Effort: Pulmonary effort is normal.      Breath sounds: Normal breath sounds.   Abdominal:      General: Abdomen is flat. Bowel sounds are normal.      Palpations: Abdomen is soft.   Neurological:      Mental Status: He is alert.              Medications:   Medications:    lisinopril  20 mg Oral Daily    aspirin  81 mg Oral Daily    atorvastatin  80 mg Oral Daily    tamsulosin  0.4 mg Oral Daily    sodium chloride flush  5-40 mL IntraVENous 2 times per day    enoxaparin  30 mg SubCUTAneous Daily      Infusions:    sodium chloride       PRN Meds: hydrALAZINE, 10 mg, Q6H PRN  atropine, 1 mg, Once PRN  sodium chloride flush, 5-40 mL, PRN  sodium chloride, , PRN  ondansetron, 4 mg, Q8H PRN   Or  ondansetron, 4 mg, Q6H PRN  polyethylene glycol, 17 g, Daily PRN  acetaminophen, 650 mg, Q6H PRN   Or  acetaminophen, 650 mg, Q6H PRN        Labs and Imaging   No results found.    CBC:   Recent Labs     09/07/24  0000 09/08/24  0307 09/09/24  0502   WBC 6.3 7.6 6.7   HGB 10.9* 10.2* 11.3*   * 125* 131*     BMP:    Recent Labs     09/07/24  0000 09/08/24  0306 09/09/24  0502    139 142   K 4.4 4.6 4.0    105 109   CO2 22 22 22   BUN 44* 48* 45*   CREATININE 1.7* 2.0* 1.5*   GLUCOSE 86 128* 110*     Hepatic:   Recent Labs     09/07/24  0000   AST 56*   ALT 48*   BILITOT 0.8   ALKPHOS 111       UA:  Lab Results   Component Value Date/Time    NITRU Negative 09/08/2024 03:07 AM

## 2024-09-10 LAB
ALBUMIN SERPL-MCNC: 3.5 G/DL (ref 3.4–5)
ANION GAP SERPL CALCULATED.3IONS-SCNC: 11 MMOL/L (ref 3–16)
BUN SERPL-MCNC: 32 MG/DL (ref 7–20)
CALCIUM SERPL-MCNC: 8.9 MG/DL (ref 8.3–10.6)
CHLORIDE SERPL-SCNC: 107 MMOL/L (ref 99–110)
CO2 SERPL-SCNC: 23 MMOL/L (ref 21–32)
CREAT SERPL-MCNC: 1.3 MG/DL (ref 0.8–1.3)
GFR SERPLBLD CREATININE-BSD FMLA CKD-EPI: 51 ML/MIN/{1.73_M2}
GLUCOSE SERPL-MCNC: 118 MG/DL (ref 70–99)
PHOSPHATE SERPL-MCNC: 2.6 MG/DL (ref 2.5–4.9)
POTASSIUM SERPL-SCNC: 4 MMOL/L (ref 3.5–5.1)
SODIUM SERPL-SCNC: 141 MMOL/L (ref 136–145)

## 2024-09-10 PROCEDURE — 97116 GAIT TRAINING THERAPY: CPT

## 2024-09-10 PROCEDURE — 83735 ASSAY OF MAGNESIUM: CPT

## 2024-09-10 PROCEDURE — 97530 THERAPEUTIC ACTIVITIES: CPT

## 2024-09-10 PROCEDURE — 97168 OT RE-EVAL EST PLAN CARE: CPT

## 2024-09-10 PROCEDURE — 6370000000 HC RX 637 (ALT 250 FOR IP): Performed by: INTERNAL MEDICINE

## 2024-09-10 PROCEDURE — 2700000000 HC OXYGEN THERAPY PER DAY

## 2024-09-10 PROCEDURE — 6360000002 HC RX W HCPCS: Performed by: INTERNAL MEDICINE

## 2024-09-10 PROCEDURE — 36415 COLL VENOUS BLD VENIPUNCTURE: CPT

## 2024-09-10 PROCEDURE — 94761 N-INVAS EAR/PLS OXIMETRY MLT: CPT

## 2024-09-10 PROCEDURE — 2000000000 HC ICU R&B

## 2024-09-10 PROCEDURE — 6370000000 HC RX 637 (ALT 250 FOR IP)

## 2024-09-10 PROCEDURE — 6360000002 HC RX W HCPCS: Performed by: NURSE PRACTITIONER

## 2024-09-10 PROCEDURE — 80069 RENAL FUNCTION PANEL: CPT

## 2024-09-10 PROCEDURE — 97164 PT RE-EVAL EST PLAN CARE: CPT

## 2024-09-10 PROCEDURE — 2580000003 HC RX 258: Performed by: INTERNAL MEDICINE

## 2024-09-10 RX ORDER — MUPIROCIN 20 MG/G
OINTMENT TOPICAL 2 TIMES DAILY
Status: DISCONTINUED | OUTPATIENT
Start: 2024-09-10 | End: 2024-09-14

## 2024-09-10 RX ORDER — ENOXAPARIN SODIUM 100 MG/ML
40 INJECTION SUBCUTANEOUS DAILY
Status: DISCONTINUED | OUTPATIENT
Start: 2024-09-11 | End: 2024-09-11

## 2024-09-10 RX ADMIN — ATORVASTATIN CALCIUM 80 MG: 80 TABLET, FILM COATED ORAL at 07:42

## 2024-09-10 RX ADMIN — LISINOPRIL 20 MG: 20 TABLET ORAL at 07:42

## 2024-09-10 RX ADMIN — HYDRALAZINE HYDROCHLORIDE 10 MG: 20 INJECTION INTRAMUSCULAR; INTRAVENOUS at 06:55

## 2024-09-10 RX ADMIN — ENOXAPARIN SODIUM 30 MG: 100 INJECTION SUBCUTANEOUS at 07:42

## 2024-09-10 RX ADMIN — METOPROLOL TARTRATE 50 MG: 50 TABLET, FILM COATED ORAL at 07:42

## 2024-09-10 RX ADMIN — HYDRALAZINE HYDROCHLORIDE 10 MG: 20 INJECTION INTRAMUSCULAR; INTRAVENOUS at 21:28

## 2024-09-10 RX ADMIN — SODIUM CHLORIDE, PRESERVATIVE FREE 10 ML: 5 INJECTION INTRAVENOUS at 07:48

## 2024-09-10 RX ADMIN — MUPIROCIN: 20 OINTMENT TOPICAL at 07:42

## 2024-09-10 RX ADMIN — SODIUM CHLORIDE, PRESERVATIVE FREE 10 ML: 5 INJECTION INTRAVENOUS at 20:29

## 2024-09-10 RX ADMIN — ACETAMINOPHEN 650 MG: 325 TABLET ORAL at 07:42

## 2024-09-10 RX ADMIN — SODIUM CHLORIDE, PRESERVATIVE FREE 10 ML: 5 INJECTION INTRAVENOUS at 21:28

## 2024-09-10 RX ADMIN — ASPIRIN 81 MG: 81 TABLET, COATED ORAL at 07:42

## 2024-09-10 RX ADMIN — MUPIROCIN: 20 OINTMENT TOPICAL at 20:29

## 2024-09-10 ASSESSMENT — PAIN SCALES - GENERAL
PAINLEVEL_OUTOF10: 0

## 2024-09-10 NOTE — CARE COORDINATION
CM update; LOS # 2; Attempt made to call daughter but number is disconnected. I have placed a call to [facility] to see if they have another contact number for family. Will follow.Miesha Simon RN

## 2024-09-10 NOTE — PROGRESS NOTES
Shift: 9/10/24 5700-0377    Admitting diagnosis: AMS, BRADYCARDIA    Presentation to hospital: HTN WITH BRADYCARDIA, AMS    Surgery: NO    Nursing assessment at handoff  stable    Emergency Contact/POA: FELIX FRANCO (DTR) [phone] HOME NUMBER  Family updated: YES    Most recent vitals: BP (!) 159/67   Pulse 57   Temp 98.2 °F (36.8 °C) (Oral)   Resp 19   Ht 1.829 m (6' 0.01\")   Wt 69.5 kg (153 lb 3.5 oz)   SpO2 98%   BMI 20.78 kg/m²      Rhythm: BBB    NC/HFNC- 3 lpm  Respiratory support: - No ventilator support    Vent days: Day 0    Increased O2 requirements: YES 2L TO 3 L NC     Admission weight Weight - Scale: 68 kg (150 lb)  Today's weight   Wt Readings from Last 1 Encounters:   09/10/24 69.5 kg (153 lb 3.5 oz)         UOP >30ml/hr: NO   Winslow need assessed each shift: YES      Restraints: NO  Order current and documentation up to date? NA    Lines/Drains  LDA Insertion Date Discontinued Date Dressing Changes   PIV  9/10 20G RFA     TLC       Arterial       Winslow  CHRONIC     Vas Cath      ETT       Surgical drains        Night Shift Hospitalist Interventions    Problem(Brief) Date Time Intervention Physician contacted                                               Drip rates at handoff:    sodium chloride         Hospital Course Daily Updates:  Admit Day# 1 9/9/2024 NIGHTS  - PT TRANSFERRED TO ICU R/T HTN ON CARDENE GTT INTERMITTENTLY  - UO TOTAL : 2910 ML  - SM BM  - COGNITION IMPROVING    Admit Day #2 9/10/24 Days  -cardene gtt d/c  -BM x2  -2 episodes of bradycardia (HR 28), asymptomatic & self-resolved   -cognition improving, oriented to person, place & situation but keeps confusing night & day  - mL    Lab Data:   CBC:   Recent Labs     09/08/24  0307 09/09/24  0502   WBC 7.6 6.7   HGB 10.2* 11.3*   HCT 30.5* 33.7*   MCV 99.8 99.1   * 131*     BMP:    Recent Labs     09/09/24  1945 09/10/24  0414    141   K 4.1 4.0   CO2 20* 23   BUN 36* 32*   CREATININE 1.2 1.3

## 2024-09-10 NOTE — PROGRESS NOTES
V2.0    [facility] Progress Note      Name:  Javier De La Torre /Age/Sex: 1929  (89 y.o. male)   MRN & CSN:  [id number] & [id number] Encounter Date/Time: 9/10/2024 8:18 AM EDT   Location:   PCP: Keyla Juan     Attending:Raoul Menendez MD       Hospital Day: 3    Assessment and Recommendations   Javier De La Torre is a 89 y.o. male with pmh of ypertension, benign prostate hypertrophy, hyperlipidemia.  who presents with Hypotension      Interval Events:   Yesterday patient's blood pressures and continue to increase to over 200/100.  He was started on his home meds of Lopressor and lisinopril yesterday with as needed clonidine.  He was transferred to the CDU for nicardipine drip, his blood pressures returned to within normal limits.    Patient was examined today at bedside.  Explained with him in detail the importance of participating in physical therapy/Occupational Therapy during his admission, and continuing in a skilled nursing facility after discharge.  [facility] to [facility] as [facility] for not needing to participate in therapy this admission.  Capacity questionable.  Making efforts with case management to contact his daughter for disposition.    Otherwise patient is stable, no new complaints or concerns.  Denies headache, shortness of breath, chest pain.    Plan:   Hypertension   -Blood pressure stable today after completion of nicardipine drip  -Continuing home lisinopril 20 mg, home metoprolol 50  -PRN clonidine  -HR has been stable   -Monitor for bradycardia    Elevated creatinine with questionable acute kidney injury   -Creatinine now at 1.3, GFR 51  -Will follow-up renal function panel    Acute urinary retention   -Patient urine output within normal limits during this admission  -Net -3500 mL in the last 24 hours    Hyperlipidemia   -Continue Lipitor 80 mg      Diet ADULT DIET; Regular   DVT Prophylaxis [x] Lovenox, []  Heparin, [] SCDs, []

## 2024-09-10 NOTE — PROGRESS NOTES
Physician Progress Note      PATIENT:               MIKE DEVLIN  [facility] #:                  [id number]  :                       1929  ADMIT DATE:       2024 2:31 AM  DISCH DATE:  RESPONDING  PROVIDER #:        Raoul Menendez MD          QUERY TEXT:    Pt admitted with weakness/hypotension.  Pt noted to have hypertension. If   possible, please document in progress notes and discharge summary if you are   evaluating and/or treating any of the following:    The medical record reflects the following:  Risk Factors: *89 y.o male with PMHx of hypertension, benign prostate   hypertrophy, hyperlipidemia.  Clinical Indicators:  - IM H+P - However his blood pressure has been on the   lower side at 91/51, also had episode of bradycardia 53, patient said he has   been eating and drinking okay, he knew he is in the hospital but did not know   the name, he knew it September but not the exact date. Hypotension   questionable if is related to medication poor oral intake especially patient   is on hydralazine and lisinopril Lopressor, all above medication has been   discontinued and plan to place patient on normal saline 75 cc/h reevaluate   over the next 24 hours.  - IM - HTN meds held initi  Treatment: EKG, telemetry, medication adjustments, PRN clonidine, Cardene gtt    Hypertensive Urgency: Defined as SBP of >180 OR DBP >120 w/o associated organ   damage. S/s may or may not be present, but can include severe headache, SOB,   epistaxis, severe anxiety. Tx: adjustment of oral BP medication; IV meds not   usually required.  Hypertensive Emergency: SBP of >180 OR DBP >120 w/ associated organ damage   (CVA, MI, acute CHF, AURELIO, encephalopathy, pulmonary edema, and unstable   angina). Documentation should include specific organ affected.  Requires   immediate treatment, usually with IV meds.  Hypertensive Crisis, unspecified: SBP of > 180 OR DBP > 120 and includes   damage to blood vessels, including inflammation,  leakage of fluid or blood and   can cause stroke, headache, heart failure and eclampsia.  Source: niid.to MS-DRG Training Guide and Quick Reference Guide  Options provided:  -- This patient has Hypertensive Urgency.  -- Other - I will add my own diagnosis  -- Disagree - Not applicable / Not valid  -- Disagree - Clinically unable to determine / Unknown  -- Refer to Clinical Documentation Reviewer    PROVIDER RESPONSE TEXT:    This patient has Hypertensive Urgency.    Query created by: Yaakov Harry on 9/10/2024 7:35 AM      Electronically signed by:  Raoul Menendez MD 9/10/2024 7:52 AM

## 2024-09-10 NOTE — PROGRESS NOTES
Occupational/Physical Therapy  Pt transferred to ICU on 9/9 d/t hypertension. Will sign-off and need new OT/PT orders when pt is medically appropriate. Thank you.    April Palm, OTR/L  Nahed Harry, PT, DPT

## 2024-09-10 NOTE — PROGRESS NOTES
Physical Therapy  Facility/Department: [facility] C2 CARD TELEMETRY  Physical Therapy Re-evaluation/Treatment     Name: Javier De La Torre  : 1929  MRN: [id number]  Date of Service: 9/10/2024    Discharge Recommendations:  Subacute/Skilled Nursing Facility (SNF if no  assist)   PT Equipment Recommendations  Equipment Needed: No  Other: defer to next level of care.      Patient Diagnosis(es): The primary encounter diagnosis was Hypotension, unspecified hypotension type. A diagnosis of Renal insufficiency was also pertinent to this visit.  Past Medical History:  has a past medical history of Hyperlipidemia, Hypertension, and Urinary retention.  Past Surgical History:  has a past surgical history that includes Cardiac surgery (2016).    Therapy discharge recommendations are subject to collaboration from the patient’s interdisciplinary healthcare team, including MD and case management recommendations.    Barriers to Home Discharge:   [] Steps to access home entry or bed/bath:   [x] Unable to transfer, ambulate, or propel wheelchair household distances without assist   [x] Limited available assist at home upon discharge    [] Patient or family requests d/c to post-acute facility    [x] Poor cognition/safety awareness for d/c to home alone    [] Unable to maintain ordered weight bearing status    [] Patient with salient signs of long-standing immobility   [] Decreased independence with ADLs   [x] Increased risk for falls   [] Other:    If pt is unable to be seen after this session, please let this note serve as discharge summary.  Please see case management note for discharge disposition.  Thank you.  Assessment  Body Structures, Functions, Activity Limitations Requiring Skilled Therapeutic Intervention: Decreased functional mobility ;Decreased endurance;Decreased posture;Decreased balance;Decreased safe awareness;Decreased strength  Assessment: Pt seen in conjunction with OT to maximize pt safety and mobility and  bedrails?: A Little  How much help is needed moving to and from a bed to a chair?: A Little  How much help is needed standing up from a chair using your arms?: A Little  How much help is needed walking in hospital room?: A Little  How much help is needed climbing 3-5 steps with a railing?: A Lot  AM-PAC Inpatient Mobility Raw Score : 17  AM-PAC Inpatient T-Scale Score : 42.13  Mobility Inpatient CMS 0-100% Score: 50.57  Mobility Inpatient CMS G-Code Modifier : CK    Goals  Short Term Goals  Time Frame for Short Term Goals: 7 days (9/17) unless otherwise noted.  Short Term Goal 1: Pt will demonstrate SBA with bed mobility.  Short Term Goal 2: Pt will demonstrate SBA with functional transfers with LRAD with safe sequencing.  Short Term Goal 3: Pt will demonstrate CGA for ambulation up to 150ft with LRAD and safe sequencing of AD.  Short Term Goal 4: Pt will participate in 4 different BLE exercises of 12-15 reps each to improve strength and endurance by 9/13.  Patient Goals   Patient Goals : \"to go home\"       Education  Patient Education  Education Given To: Patient  Education Provided: Role of Therapy;Plan of Care;Equipment;Transfer Training;Fall Prevention Strategies  Education Provided Comments: Disease Specific Education: Pt educated on importance of OOB mobility, prevention of complications of bedrest, and general safety during hospitalization.  Education Method: Verbal  Barriers to Learning: Hearing  Education Outcome: Verbalized understanding;Unable to demonstrate understanding;Continued education needed      Therapy Time   Individual Concurrent Group Co-treatment   Time In 1019         Time Out 1055         Minutes 36         Timed Code Treatment Minutes: 26 Minutes (10 min re-eval)       Nahed Harry, PT, DPT    If pt is unable to be seen after this session, please let this note serve as discharge summary.  Please see case management note for discharge disposition.  Thank you.

## 2024-09-10 NOTE — PROGRESS NOTES
Occupational Therapy  Facility/Department: [facility] C2 CARD TELEMETRY  Occupational Therapy Re-Assessment and Treatment Note    Name: Javier De La Torre  : 1929  MRN: [id number]  Date of Service: 9/10/2024    Discharge Recommendations:  Subacute/Skilled Nursing Facility  OT Equipment Recommendations  Equipment Needed:  (defer)     Therapy discharge recommendations are subject to collaboration from the patient’s interdisciplinary healthcare team, including MD and case management recommendations.    Barriers to Home Discharge:   [] Steps to access home entry or bed/bath:   [x] Unable to transfer, ambulate, or propel wheelchair household distances without assist   [x] Limited available assist at home upon discharge    [] Patient or family requests d/c to post-acute facility    [x] Poor cognition/safety awareness for d/c to home alone    [] Unable to maintain ordered weight bearing status    [] Patient with salient signs of long-standing immobility   [x] Decreased independence with ADLs   [x] Increased risk for falls   [] Other:    If pt is unable to be seen after this session, please let this note serve as discharge summary.  Please see case management note for discharge disposition.  Thank you.    Patient Diagnosis(es): The primary encounter diagnosis was Hypotension, unspecified hypotension type. A diagnosis of Renal insufficiency was also pertinent to this visit.  Past Medical History:  has a past medical history of Hyperlipidemia, Hypertension, and Urinary retention.  Past Surgical History:  has a past surgical history that includes Cardiac surgery (2016).       Assessment  Performance deficits / Impairments: Decreased functional mobility ;Decreased ADL status;Decreased balance;Decreased safe awareness;Decreased strength;Decreased endurance  Assessment: Pt seen for OT re-eval this date d/t transfer to ICU for hypertension. Pt continues to demo decreased safety awareness with mobility while in hospital. He performs  time;Contact-guard assistance  Distance (ft): 50 Feet (50+75)  Assistive Device: Walker, rolling;Gait belt  Interventions: Verbal cues;Safety awareness training;Tactile cues;Manual cues  Base of Support: Widened  Speed/Alisson: Pace decreased (< 100 feet/min)  Step Length: Right shortened;Left shortened  Gait Abnormalities: Decreased step clearance;Trunk sway increased;Path deviations (pt unsteady on his feet with ambulation with some path deviations but no overt LOB.)       AROM: Within functional limits  Strength: Within functional limits  Coordination: Within functional limits  Tone: Normal  Sensation: Intact    ADL  LE Dressing: Dependent/Total  LE Dressing Skilled Clinical Factors: don hospital pants over B feet and over hips in stance (and to thread ferrell catheter)  Toileting: Dependent/Total  Toileting Skilled Clinical Factors: ferrell  Functional Mobility: Contact guard assistance  Functional Mobility Skilled Clinical Factors: for mobility within room and in resendiz x2 trials with RW. Pt demo's decreased safety awareness with ambulation, RW use, and lines/tubes.  Additional Comments: Declined ADL tasks       Activity Tolerance  Activity Tolerance: Patient tolerated evaluation without incident;Patient limited by endurance;Treatment limited secondary to decreased cognition          Vision  Vision: Impaired  Vision Exceptions: Wears glasses for reading  Hearing  Hearing: Exceptions to [facility]  Hearing Exceptions: Hard of hearing/hearing concerns    Cognition  Overall Cognitive Status: Exceptions  Arousal/Alertness: Appears intact  Following Commands: Follows one step commands with increased time;Follows one step commands with repetition  Attention Span: Difficulty attending to directions  Memory: Decreased recall of precautions  Safety Judgement: Decreased awareness of need for assistance;Decreased awareness of need for safety  Insights: Not aware of deficits  Initiation: Requires cues for some  Sequencing: Does not

## 2024-09-10 NOTE — CARE COORDINATION
Case Management Assessment  Initial Evaluation    Date/Time of Evaluation: 9/10/2024 2:09 PM  Assessment Completed by: Miesha Simon RN    If patient is discharged prior to next notation, then this note serves as note for discharge by case management.    Patient Name: Javier De La Torre                   YOB: 1929  Diagnosis: Hypotension [I95.9]  Renal insufficiency [N28.9]  Hypotension, unspecified hypotension type [I95.9]                   Date / Time: 9/8/2024  2:31 AM    Patient Admission Status: Inpatient   Readmission Risk (Low < 19, Mod (19-27), High > 27): Readmission Risk Score: 15.1    Current PCP: Keyla Juan  PCP verified by CM? Yes (Keyla Juan)    Chart Reviewed: Yes      History Provided by: Child/Family, Medical Record  Patient Orientation: Alert and Oriented, Person, Place    Patient Cognition: Short Term Memory Deficit (per daughter is forgetting things)    Hospitalization in the last 30 days (Readmission):  No    If yes, Readmission Assessment in CM Navigator will be completed.    Advance Directives:      Code Status: Full Code   Patient's Primary Decision Maker is: Patient Declined (Legal Next of Kin Remains as Decision Maker)      Discharge Planning:    Patient lives with: Alone Type of Home: Apartment  Primary Care Giver: Self (when he does care for himself)  Patient Support Systems include: Children   Current Financial resources:  ([facility]), [facility]  Current community resources: None  Current services prior to admission: None            Current DME:              Type of Home Care services:  None    ADLS  Prior functional level: Independent in ADLs/IADLs  Current functional level: Independent in ADLs/IADLs    PT AM-PAC: 17 /24  OT AM-PAC: 14 /24    Family can provide assistance at DC: Yes (Daughter is overwhelmed)  Would you like Case Management to discuss the discharge plan with any other family members/significant others, and if so, who? Yes (Daughter

## 2024-09-10 NOTE — FLOWSHEET NOTE
09/10/24 1942   Assessment   Charting Type Shift assessment   Psychosocial   Psychosocial (WDL) X   Patient Behaviors Impulsive   Neurological   Neuro (WDL) X   Level of Consciousness 0   Cognition Follows commands;Impulsive;Poor judgement;Poor safety awareness;Poor attention/concentration;Short term memory loss   Speech Clear   Cat Coma Scale   Eye Opening 4   Best Motor Response 6   HEENT (Head, Ears, Eyes, Nose, & Throat)   HEENT (WDL) X   Teeth Missing teeth   Right Eye Impaired vision;Glasses   Left Eye Impaired vision;Glasses   Right Ear Impaired hearing   Left Ear Impaired hearing   Respiratory   Respiratory Interventions Cough & deep breathe   Respiratory Pattern Regular   Breath Sounds   Right Upper Lobe Clear   Right Lower Lobe Clear   Left Upper Lobe Clear   Left Lower Lobe Clear   Cardiac   Cardiac (WDL) X   Cardiac Regularity Regular   Heart Sounds S1, S2   Cardiac Rhythm BBB;Sinus pamela   Cardiac Symptoms Chest pain   Rhythm Interpretation   Pulse 54   Cardiac Monitor   Cardiac/Telemetry Monitor On Bedside monitor in use   Alarm Audible Yes   Alarms Set Yes   Gastrointestinal   Abdominal (WDL) X   Abdomen Inspection Flat;Soft   RUQ Bowel Sounds Active   LUQ Bowel Sounds Active   RLQ Bowel Sounds Active   LLQ Bowel Sounds Active   Genitourinary   Genitourinary (WDL) X  (ferrell)   Urine Assessment   Urinary Status Ferrell   Urinary Incontinence Absent   Peripheral Vascular   Peripheral Vascular (WDL) X   Edema Right lower extremity;Left lower extremity   RLE Edema +1;Pitting   LLE Edema +1;Pitting   RUE Neurovascular Assessment   Capillary Refill Less than/Equal to 3 seconds   Color Appropriate for Ethnicity   Temperature Warm   R Radial Pulse +2 (Moderate)   LUE Neurovascular Assessment   Capillary Refill Less than/Equal to 3 seconds   Color Appropriate for Ethnicity   Temperature Warm   L Radial Pulse +2 (Moderate)   RLE Neurovascular Assessment   Capillary Refill Less than/Equal to 3 seconds

## 2024-09-10 NOTE — PROGRESS NOTES
Shift: 9/9/24 1900 - 0700    Admitting diagnosis: AMS, BRADYCARDIA    Presentation to hospital: HTN WITH BRADYCARDIA, AMS    Surgery: NO    Nursing assessment at handoff  stable    Emergency Contact/POA: FELIX FRANCO (DTR) [phone] HOME NUMBER  Family updated: YES    Most recent vitals: BP (!) 162/73   Pulse 73   Temp 98.3 °F (36.8 °C) (Axillary)   Resp 24   Ht 1.829 m (6' 0.01\")   Wt 69.5 kg (153 lb 3.5 oz)   SpO2 90%   BMI 20.78 kg/m²      Rhythm: BBB    NC/HFNC- 3 lpm  Respiratory support: - No ventilator support    Vent days: Day 0    Increased O2 requirements: YES 2L TO 3 L NC     Admission weight Weight - Scale: 68 kg (150 lb)  Today's weight   Wt Readings from Last 1 Encounters:   09/10/24 69.5 kg (153 lb 3.5 oz)         UOP >30ml/hr: YES   Winslow need assessed each shift: YES  TOTAL UO 2910 ML    Restraints: NO  Order current and documentation up to date? NA    Lines/Drains  LDA Insertion Date Discontinued Date Dressing Changes   PIV  9/10 20G RFA     TLC       Arterial       Winslow  CHRONIC     Vas Cath      ETT       Surgical drains        Night Shift Hospitalist Interventions    Problem(Brief) Date Time Intervention Physician contacted                                               Drip rates at handoff:    niCARdipine Stopped (09/09/24 2103)    sodium chloride         Hospital Course Daily Updates:  Admit Day# 1 9/9/2024 NIGHTS  - PT TRANSFERRED TO ICU R/T HTN ON CARDENE GTT INTERMITTENTLY  - UO TOTAL : 2910 ML  - SM BM  - COGNITION IMPROVING      Lab Data:   CBC:   Recent Labs     09/08/24  0307 09/09/24  0502   WBC 7.6 6.7   HGB 10.2* 11.3*   HCT 30.5* 33.7*   MCV 99.8 99.1   * 131*     BMP:    Recent Labs     09/09/24  1945 09/10/24  0414    141   K 4.1 4.0   CO2 20* 23   BUN 36* 32*   CREATININE 1.2 1.3     LIVR: No results for input(s): \"AST\", \"ALT\" in the last 72 hours.  PT/INR: No results for input(s): \"INR\" in the last 72 hours.    Invalid input(s): \"PROT\"  APTT: No

## 2024-09-10 NOTE — PLAN OF CARE
Problem: Safety - Adult  Goal: Free from fall injury  9/9/2024 2306 by Belle Echavarria, RN  Note: No attempts to get out of bed without assistance. Pt reminded to call for assist before ambulating. Nonskid socks on. Bed locked and in lowest position. Side rails up x3. Exit alarm in use. Call light in reach. Remains free from injury. Will cont to monitor safety.     Problem: Skin/Tissue Integrity  Goal: Absence of new skin breakdown  Description: 1.  Monitor for areas of redness and/or skin breakdown  2.  Assess vascular access sites hourly  3.  Every 4-6 hours minimum:  Change oxygen saturation probe site  4.  Every 4-6 hours:  If on nasal continuous positive airway pressure, respiratory therapy assess nares and determine need for appliance change or resting period.  9/9/2024 2306 by Belle Echavarria, RN  Note: No skin breakdown noted. Pt able to turn and reposition self. Pt on low air loss pressure alternating mattress. Will cont to monitor skin integrity.

## 2024-09-11 LAB
ALBUMIN SERPL-MCNC: 3.1 G/DL (ref 3.4–5)
ANION GAP SERPL CALCULATED.3IONS-SCNC: 9 MMOL/L (ref 3–16)
ANION GAP SERPL CALCULATED.3IONS-SCNC: 9 MMOL/L (ref 3–16)
BUN SERPL-MCNC: 41 MG/DL (ref 7–20)
BUN SERPL-MCNC: 43 MG/DL (ref 7–20)
CALCIUM SERPL-MCNC: 8.3 MG/DL (ref 8.3–10.6)
CALCIUM SERPL-MCNC: 8.3 MG/DL (ref 8.3–10.6)
CHLORIDE SERPL-SCNC: 106 MMOL/L (ref 99–110)
CHLORIDE SERPL-SCNC: 108 MMOL/L (ref 99–110)
CO2 SERPL-SCNC: 23 MMOL/L (ref 21–32)
CO2 SERPL-SCNC: 24 MMOL/L (ref 21–32)
CREAT SERPL-MCNC: 1.5 MG/DL (ref 0.8–1.3)
CREAT SERPL-MCNC: 1.5 MG/DL (ref 0.8–1.3)
GFR SERPLBLD CREATININE-BSD FMLA CKD-EPI: 43 ML/MIN/{1.73_M2}
GFR SERPLBLD CREATININE-BSD FMLA CKD-EPI: 43 ML/MIN/{1.73_M2}
GLUCOSE SERPL-MCNC: 104 MG/DL (ref 70–99)
GLUCOSE SERPL-MCNC: 104 MG/DL (ref 70–99)
MAGNESIUM SERPL-MCNC: 1.3 MG/DL (ref 1.8–2.4)
PHOSPHATE SERPL-MCNC: 2.5 MG/DL (ref 2.5–4.9)
POTASSIUM SERPL-SCNC: 3.6 MMOL/L (ref 3.5–5.1)
POTASSIUM SERPL-SCNC: 3.8 MMOL/L (ref 3.5–5.1)
SODIUM SERPL-SCNC: 138 MMOL/L (ref 136–145)
SODIUM SERPL-SCNC: 141 MMOL/L (ref 136–145)

## 2024-09-11 PROCEDURE — 97116 GAIT TRAINING THERAPY: CPT

## 2024-09-11 PROCEDURE — 93005 ELECTROCARDIOGRAM TRACING: CPT | Performed by: INTERNAL MEDICINE

## 2024-09-11 PROCEDURE — 6360000002 HC RX W HCPCS: Performed by: INTERNAL MEDICINE

## 2024-09-11 PROCEDURE — 2580000003 HC RX 258: Performed by: INTERNAL MEDICINE

## 2024-09-11 PROCEDURE — 36415 COLL VENOUS BLD VENIPUNCTURE: CPT

## 2024-09-11 PROCEDURE — 97530 THERAPEUTIC ACTIVITIES: CPT

## 2024-09-11 PROCEDURE — 6360000002 HC RX W HCPCS: Performed by: NURSE PRACTITIONER

## 2024-09-11 PROCEDURE — 6370000000 HC RX 637 (ALT 250 FOR IP)

## 2024-09-11 PROCEDURE — 6370000000 HC RX 637 (ALT 250 FOR IP): Performed by: INTERNAL MEDICINE

## 2024-09-11 PROCEDURE — 99223 1ST HOSP IP/OBS HIGH 75: CPT | Performed by: INTERNAL MEDICINE

## 2024-09-11 PROCEDURE — 2060000000 HC ICU INTERMEDIATE R&B

## 2024-09-11 PROCEDURE — 80069 RENAL FUNCTION PANEL: CPT

## 2024-09-11 RX ORDER — 0.9 % SODIUM CHLORIDE 0.9 %
500 INTRAVENOUS SOLUTION INTRAVENOUS ONCE
Status: COMPLETED | OUTPATIENT
Start: 2024-09-11 | End: 2024-09-11

## 2024-09-11 RX ORDER — ENOXAPARIN SODIUM 100 MG/ML
30 INJECTION SUBCUTANEOUS DAILY
Status: DISCONTINUED | OUTPATIENT
Start: 2024-09-11 | End: 2024-09-13

## 2024-09-11 RX ORDER — MAGNESIUM SULFATE IN WATER 40 MG/ML
2000 INJECTION, SOLUTION INTRAVENOUS ONCE
Status: COMPLETED | OUTPATIENT
Start: 2024-09-11 | End: 2024-09-11

## 2024-09-11 RX ADMIN — HYDRALAZINE HYDROCHLORIDE 10 MG: 20 INJECTION INTRAMUSCULAR; INTRAVENOUS at 21:20

## 2024-09-11 RX ADMIN — ATORVASTATIN CALCIUM 80 MG: 80 TABLET, FILM COATED ORAL at 08:56

## 2024-09-11 RX ADMIN — ASPIRIN 81 MG: 81 TABLET, COATED ORAL at 08:57

## 2024-09-11 RX ADMIN — METOPROLOL TARTRATE 50 MG: 50 TABLET, FILM COATED ORAL at 08:56

## 2024-09-11 RX ADMIN — CLONIDINE HYDROCHLORIDE 0.1 MG: 0.1 TABLET ORAL at 02:51

## 2024-09-11 RX ADMIN — LISINOPRIL 20 MG: 20 TABLET ORAL at 08:58

## 2024-09-11 RX ADMIN — SODIUM CHLORIDE 500 ML: 9 INJECTION, SOLUTION INTRAVENOUS at 15:22

## 2024-09-11 RX ADMIN — MAGNESIUM SULFATE HEPTAHYDRATE 2000 MG: 40 INJECTION, SOLUTION INTRAVENOUS at 01:35

## 2024-09-11 RX ADMIN — SODIUM CHLORIDE, PRESERVATIVE FREE 10 ML: 5 INJECTION INTRAVENOUS at 21:20

## 2024-09-11 RX ADMIN — HYDRALAZINE HYDROCHLORIDE 10 MG: 20 INJECTION INTRAMUSCULAR; INTRAVENOUS at 05:54

## 2024-09-11 RX ADMIN — MUPIROCIN: 20 OINTMENT TOPICAL at 08:56

## 2024-09-11 RX ADMIN — SODIUM CHLORIDE, PRESERVATIVE FREE 10 ML: 5 INJECTION INTRAVENOUS at 05:54

## 2024-09-11 RX ADMIN — MUPIROCIN: 20 OINTMENT TOPICAL at 21:20

## 2024-09-11 RX ADMIN — CLONIDINE HYDROCHLORIDE 0.1 MG: 0.1 TABLET ORAL at 07:46

## 2024-09-11 RX ADMIN — SODIUM CHLORIDE, PRESERVATIVE FREE 10 ML: 5 INJECTION INTRAVENOUS at 01:34

## 2024-09-11 RX ADMIN — SODIUM CHLORIDE, PRESERVATIVE FREE 5 ML: 5 INJECTION INTRAVENOUS at 07:55

## 2024-09-11 RX ADMIN — ENOXAPARIN SODIUM 30 MG: 100 INJECTION SUBCUTANEOUS at 08:56

## 2024-09-11 RX ADMIN — SODIUM CHLORIDE, PRESERVATIVE FREE 5 ML: 5 INJECTION INTRAVENOUS at 09:00

## 2024-09-11 NOTE — CARE COORDINATION
LOS 3.  Care managed by [facility]. From home alone. Family/dtr  assists as needed. Dtr named [facility] if SNF needed- call to facility and referred. Per previous notes- pt has used his MCR benefit in the past for SNF.  [facility]  CM following. Nely Odonnell RN     Addendum- received message from [facility] that pt not service connected- CM will continue to pursue SNF under secondary MCR benefits. Nely Odonnell RN    Patient Seen in: [facility]      History   Patient presents with:  Upper Extremity Injury    Stated Complaint: ARM PAIN    HPI    [employer]er [employer]  DOI 2/3/20  Job description: Inventory     15-year-old female 82   Temp 98.2 °F (36.8 °C) (Temporal)   Resp 16   Ht 160 cm (5' 3\")   Wt 72.6 kg   LMP 02/04/2020   SpO2 99%   Breastfeeding No   BMI 28.34 kg/m²         Physical Exam  Vitals signs and nursing note reviewed.    Constitutional:       General: She is not i questions and concerns are addressed to the patient's satisfaction prior to discharge today.           Disposition and Plan     Clinical Impression:  Radicular pain of right upper extremity  (primary encounter diagnosis)    Disposition:  Discharge  2/16/202

## 2024-09-11 NOTE — PROGRESS NOTES
Assessment completed and medications given. Patient is A&Ox3-4 with forgetfulness,  denies any needs at this time. Call light and personal belongings are within reach. Standard safety measures at this time.

## 2024-09-11 NOTE — CONSULTS
Electrophysiology Consultation   Date: 9/11/2024  Admit Date:  9/8/2024  Reason for Consultation: New onset atrial fibrillation/flutter  Consult Requesting Physician: Raoul Menendez MD     Chief Complaint   Patient presents with    Issues with ADLs     Pt reports that he has trouble walking, emptying his bag and he is afraid he will fall and not be able to get up.      HPI:   Mr. De La Torre is a pleasant 89-year-old male with a medical history significant for ischemic cardiomyopathy status post CABG with left atrial appendage clip (noted on chart review from outside hospital; [facility]), hypertension, hyperlipidemia, history of melanoma, chronic renal insufficiency, and urinary tract obstruction who presents from home with weakness and difficulty caring for himself with his Winslow catheter.  According to patient and his daughter, Mariel, patient was recently released from a care facility back home under his own discretion per his insistence.  His daughter states that she brought him to the emergency room on 09/08/2024 due to urinary retention.  He was discharged home with a Winslow catheter.  His daughter attempted to teach him how to use an empty his bag unfortunately patient was unable to do so.  He called his daughter and EMS was called to bring the patient emergency room.  Upon arriving [facility] patient found to be bradycardic and reported weakness.  At this point, given some renal insufficiency, he was admitted for further evaluation.    Patient denies tobacco use.  Denies alcohol use.  Denies illicit drug use.  He has 3 living children all of whom have some medical conditions.  He relies predominantly on Mariel for assistance.  Patient is adamant that he would like to be discharged as soon as possible back to his home.  Mariel states that she is not able to care for him at this point given her own family and constraints with her personal illnesses.  She attempts to lay out his medications however there has

## 2024-09-11 NOTE — PLAN OF CARE
Problem: Safety - Adult  Goal: Free from fall injury  9/11/2024 1815 by Behzad Fofana RN  Outcome: Progressing  9/11/2024 1320 by Maya Gutierrez RN  Outcome: Progressing  Flowsheets (Taken 9/11/2024 1100)  Free From Fall Injury: Instruct family/caregiver on patient safety     Problem: Discharge Planning  Goal: Discharge to home or other facility with appropriate resources  9/11/2024 1815 by Behzad Fofana RN  Outcome: Progressing  9/11/2024 1320 by Maya Gutierrez RN  Outcome: Progressing  Flowsheets (Taken 9/11/2024 0800)  Discharge to home or other facility with appropriate resources:   Identify barriers to discharge with patient and caregiver   Arrange for needed discharge resources and transportation as appropriate   Identify discharge learning needs (meds, wound care, etc)   Refer to discharge planning if patient needs post-hospital services based on physician order or complex needs related to functional status, cognitive ability or social support system     Problem: Skin/Tissue Integrity  Goal: Absence of new skin breakdown  Description: 1.  Monitor for areas of redness and/or skin breakdown  2.  Assess vascular access sites hourly  3.  Every 4-6 hours minimum:  Change oxygen saturation probe site  4.  Every 4-6 hours:  If on nasal continuous positive airway pressure, respiratory therapy assess nares and determine need for appliance change or resting period.  9/11/2024 1815 by Behzad Fofana RN  Outcome: Progressing  9/11/2024 1320 by Maya Gutierrez RN  Outcome: Progressing     Problem: ABCDS Injury Assessment  Goal: Absence of physical injury  9/11/2024 1815 by Behzad Fofana RN  Outcome: Progressing  9/11/2024 1320 by Maya Gutierrez RN  Outcome: Progressing  Flowsheets (Taken 9/11/2024 1100)  Absence of Physical Injury: Implement safety measures based on patient assessment

## 2024-09-11 NOTE — PROGRESS NOTES
Patient's /86 PRN Hydralazine 10 mg given per MAR.   patient and staff patient and staff patient and staff patient and staff patient and staff patient and staff

## 2024-09-11 NOTE — PROGRESS NOTES
home unless I have to \"    AM-PAC - ADL  AM-PAC Daily Activity - Inpatient   How much help is needed for putting on and taking off regular lower body clothing?: Total  How much help is needed for bathing (which includes washing, rinsing, drying)?: A Lot  How much help is needed for toileting (which includes using toilet, bedpan, or urinal)?: Total  How much help is needed for putting on and taking off regular upper body clothing?: A Little  How much help is needed for taking care of personal grooming?: A Little  How much help for eating meals?: None  AM-[facility] Inpatient Daily Activity Raw Score: 14  AM-PAC Inpatient ADL T-Scale Score : 33.39  ADL Inpatient CMS 0-100% Score: 59.67  ADL Inpatient CMS G-Code Modifier : CK    Therapy Time   Individual Concurrent Group Co-treatment   Time In 1011         Time Out 1034         Minutes 23         Timed Code Treatment Minutes: 23 Minutes       April Palm, OT

## 2024-09-11 NOTE — CONSULTS
Electrophysiology Consultation   Date: 9/11/2024  Admit Date:  9/8/2024  Reason for Consultation: Atri  Consult Requesting Physician: Raoul Menendez MD     Chief Complaint   Patient presents with    Issues with ADLs     Pt reports that he has trouble walking, emptying his bag and he is afraid he will fall and not be able to get up.      HPI: Javier De La Torre is a 89 y.o. ***    Patient denies fevers, chest pain, orthopnea, PND, lower extremity edema, abdominal swelling, shortness of breath, dyspnea on exertion, chills, visual changes, headaches, sore throat, cough, abdominal pain, nausea, vomiting, bleeding, bruising, dysuria, muscle/joint pain, confusion, depression, anxiety, skin lesions, etc.    Emergency Room/Hospital Course:  ***    Current rhythm: ***  Known history of atrial fibrillation: {YES***/NO:60}  Valvular disease: {YES / NO:19727}  Associated symptoms: {SYMPTOMS; ARRHYTHMIA:19173}  Heart rate {IS/IS NOT:19932} controlled  Previous cardioversion and/or ablation: {YES***/NO:60}  History of CAD: {YES / NO:19727}  History of sleep apnea: {YES / NO:19727}  History of ETOH abuse/drug abuse: {YES / NO:19727}  History of thyroid disease: {YES / No:19727}  Elevated BNP: {YES/No:19727}  Left atrial size is {Desc; normal/abnormal:54817::\"Normal\"}    Past Medical History:   Diagnosis Date    Hyperlipidemia     Hypertension     Urinary retention         Past Surgical History:   Procedure Laterality Date    CARDIAC SURGERY  2016    open heart       Allergies   Allergen Reactions    Amlodipine Hives    Hydrochlorothiazide Dizziness or Vertigo    Terazosin Hcl Hallucinations    Zetia [Ezetimibe]        Social History:  Reviewed.  reports that he has never smoked. He has never used smokeless tobacco. He reports that he does not drink alcohol and does not use drugs.     Family History:  Reviewed. family history is not on file.   No premature CAD.     Review of System:  All other systems reviewed except for that noted  recommended.      Patient's [id number] ( congestive heart failure, hypertension, age [65-75], sex, cerebral vascular disease, vascular disease, and diabetes mellitus) score is *** with a stroke risk of ***%.  Patients HASBLED (hypertension, renal disease [dialysis, transplant, Cr > 2.26], liver disease [LVT > 3x + bilirubin 2x, cirrhosis], stroke history, prior major bleeding or predisposed to bleeding, liable INR, age > 65, medications predisposing to bleeding [aspirin, plavix, NSAIDs], and alcohol use [>8 drinks per week]) score is ***.  We discussed oral anticoagulation to decrease the risk of thromboembolic events including stroke. Benefits and alternatives were discussed with patient. Risk of bleeding was discussed. Patient verbalized understanding. Different forms of anticoagulants including warfarin (Coumadin), Dabigatran (Pradaxa), Rivaroxaban (Xarelto), and Apixaban (Eliquis) were discussed. Patient opted to start with ***.        Rate control strategy options including cardioversion, atrial fibrillation ablation, pacemaker with AVN ablation, anti-arrhythmic strategy, and rate control strategy were discussed with patient. Risks, benefits and alternative of each treatment options were explained. All questions were answered.  Patient has opted for ***.  - I will order thyroid function testing.  - I will order hemoglobin A1c.  - I will order echocardiogram. ***  - I agree with echocardiogram order. ***  - Outpatient referral to pulmonology for sleep apnea testing.    Thank you for allowing me to participate in the care of Javier De La Torre . If you have any questions/comments, please do not hesitate to contact us.    Joaquim Torres MD  Cardiac Electrophysiology  [facility]  [phone] [place] Office

## 2024-09-11 NOTE — PROGRESS NOTES
V2.0    [facility] Progress Note      Name:  Javier De La Torre /Age/Sex: 1929  (89 y.o. male)   MRN & CSN:  [id number] & [id number] Encounter Date/Time: 2024 8:18 AM EDT   Location:  50225-01 PCP: Keyla Juan     Attending:Raoul Meenndez MD       Hospital Day: 4    Assessment and Recommendations   Javier De La Torre is a 89 y.o. male with pmh of ypertension, benign prostate hypertrophy, hyperlipidemia.  who presents with Hypotension      Interval Events:   Patient given as needed hydralazine 10 mg for hypertension and as needed clonidine 0.1 mg for hypertension earlier this morning.    Patient was examined today at bedside.  No new complaints or concerns.  Endorses some lightheadedness which he says is a result of the medications he is taking.  Making efforts with case management to contact his daughter for disposition.    Otherwise patient is stable, no new complaints or concerns.  Denies headache, shortness of breath, chest pain.    Plan:   Hypertension   -Blood pressure stable today after completion of nicardipine drip  -Continuing home lisinopril 20 mg, home metoprolol 50  -PRN clonidine, hydralazine given  -Nephrology consulted for difficult to manage blood pressures, pending their input    Elevated creatinine with questionable acute kidney injury   -Creatinine now at 1.5 GFR decreased to 41  -Will follow-up renal function panel    Acute urinary retention   -Patient urine output within normal limits during this admission  -Net -1025 mL in the last 24 hours  -Follow-up results with voiding trial today  Hyperlipidemia   -Continue Lipitor 80 mg      Diet ADULT DIET; Regular   DVT Prophylaxis [x] Lovenox, []  Heparin, [] SCDs, [] Ambulation,  [] Eliquis, [] Xarelto  [] Coumadin   Code Status Full Code   Disposition From: Home  Expected Disposition: Home  Estimated Date of Discharge: 2024   patient requires continued admission due to    Surrogate Decision Maker/ Mariel Keith -daughter

## 2024-09-11 NOTE — CONSULTS
[facility] Consult Note           Reason for Consult:  Labile blood pressures  Requesting Physician:  Dr. Lei    Chief Complaint:  Weakness  History Obtained From:  patient, electronic medical record    History of Present Ilness:    89 year old pleasant male with CKD stage 3b, HTN, BPH presents with hypotension.  We have been asked to assist in further mgmt of his HTN.    BP's initially ~90's/50's on admission, required IVF's.  BP's have been as high as ~200's/70's, required nicardipine drip, off since 9/9.  Resumed on lisinopril & metoprolol which he takes at home, off scheduled hydralazine.  Had reduced intake PTA, better now.  Recent issues with urinary retention requiring ferrell, now out today.  Denies any shortness of breath, abdominal pain, or lightheadedness.    Past Medical History:        Diagnosis Date    Hyperlipidemia     Hypertension     Urinary retention        Past Surgical History:        Procedure Laterality Date    CARDIAC SURGERY  2016    open heart       Home Medications:    No current facility-administered medications on file prior to encounter.     Current Outpatient Medications on File Prior to Encounter   Medication Sig Dispense Refill    aspirin 81 MG tablet Take 1 tablet by mouth daily      tamsulosin (FLOMAX) 0.4 MG capsule Take 1 capsule by mouth daily      metoprolol tartrate (LOPRESSOR) 50 MG tablet Take 1 tablet by mouth 2 times daily Indications: 1.5 tabs two times per day.      lisinopril (PRINIVIL;ZESTRIL) 20 MG tablet Take 1 tablet by mouth daily Indications: 1.5 tab daily      hydrALAZINE (APRESOLINE) 50 MG tablet Take 2 tablets by mouth 3 times daily      atorvastatin (LIPITOR) 80 MG tablet Take 1 tablet by mouth daily Indications: 0.5 tabs at bedtime         Allergies:  Amlodipine, Hydrochlorothiazide, Terazosin hcl, and Zetia [ezetimibe]    Social History:    Social History     Socioeconomic History    Marital status:

## 2024-09-11 NOTE — PROGRESS NOTES
Pt transferred to C4 in stable condition.  Report to RON Beck.  Updated.  Belongs sent with pt.  All questions answered.

## 2024-09-11 NOTE — PROGRESS NOTES
Physical Therapy  Facility/Department: [facility] C2 CARD TELEMETRY  Daily Treatment Note  NAME: Javier De La Torre  : 1929  MRN: [id number]    Date of Service: 2024    Discharge Recommendations:  Subacute/Skilled Nursing Facility (vs. 24-hr sup/assist & home PT, SNF if 24-hr not available)   PT Equipment Recommendations  Equipment Needed: No  Other: TBD as pt progresses with therapy -- may require RW if discharging directly home    Therapy discharge recommendations are subject to collaboration from the patient’s interdisciplinary healthcare team, including MD and case management recommendations.    Barriers to Home Discharge:   [] Steps to access home entry or bed/bath:   [x] Unable to transfer, ambulate, or propel wheelchair household distances without assist   [x] Limited available assist at home upon discharge (pt lives alone, it appears 24-hr sup/assist not available from family)   [] Patient or family requests d/c to post-acute facility    [] Poor cognition/safety awareness for d/c to home alone    [] Unable to maintain ordered weight bearing status    [] Patient with salient signs of long-standing immobility   [x] Decreased independence with ADLs   [x] Increased risk for falls    If pt is unable to be seen after this session, please let this note serve as discharge summary.  Please see case management note for discharge disposition.  Thank you.    Patient Diagnosis(es): The primary encounter diagnosis was Hypotension, unspecified hypotension type. A diagnosis of Renal insufficiency was also pertinent to this visit.    Assessment  Assessment: Pt participated well when seen for PT follow-up this date.  Pt progressing well since last seen by PT, now performing functional mobility tasks with CGA/SBA x 1 and ambulating increased distance (150 feet) with CGA x 1.  Pt with very accelerated gait today, requiring cues to amb more slowly/carefully at times -- especially when navigating turns.  Pt still requiring RW

## 2024-09-11 NOTE — PROGRESS NOTES
Pt ademantly wanting to leave AMA. \"[facility]!\" Primary RN educated pt on leaving AMA is within his rights but not advisable. Pt wants to [facility] go home or to [facility]. RN educated pt that case management works diligently to get our pt's where they where they need to be and in most cases where they would like to be upon discharge. Pt agreeable to wait until said meeting tomorrow 9/12 @1100am to make any further decisions about leaving AMA. Will notify nightshift RN in report of his desire to go home or to [facility].

## 2024-09-11 NOTE — PROGRESS NOTES
4 Eyes Skin Assessment     NAME:  Javier De La Torre  YOB: 1929  MEDICAL RECORD NUMBER:  [id number]    The patient is being assessed for  Transfer to New Unit    I agree that at least one RN has performed a thorough Head to Toe Skin Assessment on the patient. ALL assessment sites listed below have been assessed.      Areas assessed by both nurses:    Head, Face, Ears, Shoulders, Back, Chest, Arms, Elbows, Hands, Sacrum. Buttock, Coccyx, Ischium, and Legs. Feet and Heels        Does the Patient have a Wound? No noted wound(s)       [place] Prevention initiated by RN: No  Wound Care Orders initiated by RN: No    Pressure Injury (Stage 3,4, Unstageable, DTI, NWPT, and Complex wounds) if present, place Wound referral order by RN under : No    New Ostomies, if present place, Ostomy referral order under : No     Nurse 1 eSignature: Electronically signed by Behzad Fofana RN on 9/11/24 at 7:29 PM EDT    **SHARE this note so that the co-signing nurse can place an eSignature**    Nurse 2 eSignature: Electronically signed by Bang Clark RN on 9/12/24 at 7:11 AM EDT

## 2024-09-11 NOTE — PLAN OF CARE
Pt seen.  Full note to come.  Hold BB.  CTA for FEROZ clip (if he will allow us).  Placement (may need psychiatry's assistance).  Discussed with BORIS.    Joaquim Torres MD  Cardiac Electrophysiology  [facility]  [phone] [place] Office

## 2024-09-11 NOTE — PLAN OF CARE
Problem: Safety - Adult  Goal: Free from fall injury  Outcome: Progressing  Flowsheets (Taken 9/11/2024 1100)  Free From Fall Injury: Instruct family/caregiver on patient safety     Problem: Discharge Planning  Goal: Discharge to home or other facility with appropriate resources  Outcome: Progressing  Flowsheets (Taken 9/11/2024 0800)  Discharge to home or other facility with appropriate resources:   Identify barriers to discharge with patient and caregiver   Arrange for needed discharge resources and transportation as appropriate   Identify discharge learning needs (meds, wound care, etc)   Refer to discharge planning if patient needs post-hospital services based on physician order or complex needs related to functional status, cognitive ability or social support system     Problem: Skin/Tissue Integrity  Goal: Absence of new skin breakdown  Description: 1.  Monitor for areas of redness and/or skin breakdown  2.  Assess vascular access sites hourly  3.  Every 4-6 hours minimum:  Change oxygen saturation probe site  4.  Every 4-6 hours:  If on nasal continuous positive airway pressure, respiratory therapy assess nares and determine need for appliance change or resting period.  Outcome: Progressing     Problem: ABCDS Injury Assessment  Goal: Absence of physical injury  Outcome: Progressing  Flowsheets (Taken 9/11/2024 1100)  Absence of Physical Injury: Implement safety measures based on patient assessment

## 2024-09-12 ENCOUNTER — APPOINTMENT (OUTPATIENT)
Age: 89
DRG: 312 | End: 2024-09-12
Attending: INTERNAL MEDICINE
Payer: OTHER GOVERNMENT

## 2024-09-12 PROBLEM — I48.3 TYPICAL ATRIAL FLUTTER (HCC): Status: ACTIVE | Noted: 2024-09-12

## 2024-09-12 PROBLEM — I48.0 PAF (PAROXYSMAL ATRIAL FIBRILLATION) (HCC): Status: ACTIVE | Noted: 2024-09-12

## 2024-09-12 LAB
ALBUMIN SERPL-MCNC: 3.7 G/DL (ref 3.4–5)
ANION GAP SERPL CALCULATED.3IONS-SCNC: 11 MMOL/L (ref 3–16)
BUN SERPL-MCNC: 42 MG/DL (ref 7–20)
CALCIUM SERPL-MCNC: 9 MG/DL (ref 8.3–10.6)
CHLORIDE SERPL-SCNC: 106 MMOL/L (ref 99–110)
CO2 SERPL-SCNC: 23 MMOL/L (ref 21–32)
CREAT SERPL-MCNC: 1.6 MG/DL (ref 0.8–1.3)
ECHO AO ASC DIAM: 3.3 CM
ECHO AO ASCENDING AORTA INDEX: 1.76 CM/M2
ECHO AO ROOT DIAM: 3 CM
ECHO AO ROOT INDEX: 1.6 CM/M2
ECHO AV AREA PEAK VELOCITY: 1.5 CM2
ECHO AV AREA VTI: 1.7 CM2
ECHO AV AREA/BSA PEAK VELOCITY: 0.8 CM2/M2
ECHO AV AREA/BSA VTI: 0.9 CM2/M2
ECHO AV MEAN GRADIENT: 4 MMHG
ECHO AV MEAN VELOCITY: 0.9 M/S
ECHO AV PEAK GRADIENT: 7 MMHG
ECHO AV PEAK VELOCITY: 1.3 M/S
ECHO AV VELOCITY RATIO: 0.46
ECHO AV VTI: 23.8 CM
ECHO BSA: 1.85 M2
ECHO EST RA PRESSURE: 15 MMHG
ECHO LA AREA 2C: 24.4 CM2
ECHO LA AREA 4C: 24 CM2
ECHO LA DIAMETER INDEX: 2.83 CM/M2
ECHO LA DIAMETER: 5.3 CM
ECHO LA MAJOR AXIS: 6.9 CM
ECHO LA MINOR AXIS: 6.2 CM
ECHO LA TO AORTIC ROOT RATIO: 1.77
ECHO LA VOL BP: 75 ML (ref 18–58)
ECHO LA VOL MOD A2C: 77 ML (ref 18–58)
ECHO LA VOL MOD A4C: 68 ML (ref 18–58)
ECHO LA VOL/BSA BIPLANE: 40 ML/M2 (ref 16–34)
ECHO LA VOLUME INDEX MOD A2C: 41 ML/M2 (ref 16–34)
ECHO LA VOLUME INDEX MOD A4C: 36 ML/M2 (ref 16–34)
ECHO LV E' LATERAL VELOCITY: 10 CM/S
ECHO LV E' SEPTAL VELOCITY: 9 CM/S
ECHO LV EF PHYSICIAN: 58 %
ECHO LV FRACTIONAL SHORTENING: 19 % (ref 28–44)
ECHO LV INTERNAL DIMENSION DIASTOLE INDEX: 2.25 CM/M2
ECHO LV INTERNAL DIMENSION DIASTOLIC: 4.2 CM (ref 4.2–5.9)
ECHO LV INTERNAL DIMENSION SYSTOLIC INDEX: 1.82 CM/M2
ECHO LV INTERNAL DIMENSION SYSTOLIC: 3.4 CM
ECHO LV IVSD: 1.3 CM (ref 0.6–1)
ECHO LV MASS 2D: 189.2 G (ref 88–224)
ECHO LV MASS INDEX 2D: 101.2 G/M2 (ref 49–115)
ECHO LV POSTERIOR WALL DIASTOLIC: 1.2 CM (ref 0.6–1)
ECHO LV RELATIVE WALL THICKNESS RATIO: 0.57
ECHO LVOT AREA: 3.1 CM2
ECHO LVOT AV VTI INDEX: 0.54
ECHO LVOT DIAM: 2 CM
ECHO LVOT MEAN GRADIENT: 1 MMHG
ECHO LVOT PEAK GRADIENT: 2 MMHG
ECHO LVOT PEAK VELOCITY: 0.6 M/S
ECHO LVOT STROKE VOLUME INDEX: 21.7 ML/M2
ECHO LVOT SV: 40.5 ML
ECHO LVOT VTI: 12.9 CM
ECHO MV A VELOCITY: 0.64 M/S
ECHO MV E DECELERATION TIME (DT): 132 MS
ECHO MV E VELOCITY: 1.38 M/S
ECHO MV E/A RATIO: 2.16
ECHO MV E/E' LATERAL: 13.8
ECHO MV E/E' RATIO (AVERAGED): 14.57
ECHO MV E/E' SEPTAL: 15.33
ECHO PULMONARY ARTERY END DIASTOLIC PRESSURE: 8 MMHG
ECHO PV REGURGITANT MAX VELOCITY: 1.4 M/S
ECHO RA AREA 4C: 27.7 CM2
ECHO RA END SYSTOLIC VOLUME APICAL 4 CHAMBER INDEX BSA: 51 ML/M2
ECHO RA VOLUME: 95 ML
ECHO RIGHT VENTRICULAR SYSTOLIC PRESSURE (RVSP): 77 MMHG
ECHO RV FREE WALL PEAK S': 9 CM/S
ECHO RV TAPSE: 1.5 CM (ref 1.7–?)
ECHO TV REGURGITANT MAX VELOCITY: 3.93 M/S
ECHO TV REGURGITANT PEAK GRADIENT: 62 MMHG
EKG ATRIAL RATE: 227 BPM
EKG DIAGNOSIS: NORMAL
EKG Q-T INTERVAL: 496 MS
EKG QRS DURATION: 142 MS
EKG QTC CALCULATION (BAZETT): 496 MS
EKG R AXIS: 96 DEGREES
EKG T AXIS: 12 DEGREES
EKG VENTRICULAR RATE: 60 BPM
EST. AVERAGE GLUCOSE BLD GHB EST-MCNC: 119.8 MG/DL
GFR SERPLBLD CREATININE-BSD FMLA CKD-EPI: 39 ML/MIN/{1.73_M2}
GLUCOSE SERPL-MCNC: 108 MG/DL (ref 70–99)
HBA1C MFR BLD: 5.8 %
PHOSPHATE SERPL-MCNC: 2.7 MG/DL (ref 2.5–4.9)
POTASSIUM SERPL-SCNC: 3.8 MMOL/L (ref 3.5–5.1)
SODIUM SERPL-SCNC: 140 MMOL/L (ref 136–145)
T4 FREE SERPL-MCNC: 1.2 NG/DL (ref 0.9–1.8)
TSH SERPL DL<=0.005 MIU/L-ACNC: 11.9 UIU/ML (ref 0.27–4.2)

## 2024-09-12 PROCEDURE — 84443 ASSAY THYROID STIM HORMONE: CPT

## 2024-09-12 PROCEDURE — 93306 TTE W/DOPPLER COMPLETE: CPT | Performed by: INTERNAL MEDICINE

## 2024-09-12 PROCEDURE — 6360000002 HC RX W HCPCS: Performed by: INTERNAL MEDICINE

## 2024-09-12 PROCEDURE — 76376 3D RENDER W/INTRP POSTPROCES: CPT | Performed by: INTERNAL MEDICINE

## 2024-09-12 PROCEDURE — 2060000000 HC ICU INTERMEDIATE R&B

## 2024-09-12 PROCEDURE — 84439 ASSAY OF FREE THYROXINE: CPT

## 2024-09-12 PROCEDURE — 6360000002 HC RX W HCPCS: Performed by: NURSE PRACTITIONER

## 2024-09-12 PROCEDURE — 97116 GAIT TRAINING THERAPY: CPT

## 2024-09-12 PROCEDURE — 6370000000 HC RX 637 (ALT 250 FOR IP): Performed by: INTERNAL MEDICINE

## 2024-09-12 PROCEDURE — 99232 SBSQ HOSP IP/OBS MODERATE 35: CPT | Performed by: NURSE PRACTITIONER

## 2024-09-12 PROCEDURE — 93010 ELECTROCARDIOGRAM REPORT: CPT | Performed by: INTERNAL MEDICINE

## 2024-09-12 PROCEDURE — 97110 THERAPEUTIC EXERCISES: CPT

## 2024-09-12 PROCEDURE — 76376 3D RENDER W/INTRP POSTPROCES: CPT

## 2024-09-12 PROCEDURE — 36415 COLL VENOUS BLD VENIPUNCTURE: CPT

## 2024-09-12 PROCEDURE — 83036 HEMOGLOBIN GLYCOSYLATED A1C: CPT

## 2024-09-12 PROCEDURE — 80069 RENAL FUNCTION PANEL: CPT

## 2024-09-12 PROCEDURE — 2580000003 HC RX 258: Performed by: INTERNAL MEDICINE

## 2024-09-12 PROCEDURE — 97535 SELF CARE MNGMENT TRAINING: CPT

## 2024-09-12 PROCEDURE — 97530 THERAPEUTIC ACTIVITIES: CPT

## 2024-09-12 RX ORDER — LISINOPRIL 20 MG/1
20 TABLET ORAL DAILY
Status: DISCONTINUED | OUTPATIENT
Start: 2024-09-12 | End: 2024-09-17 | Stop reason: HOSPADM

## 2024-09-12 RX ADMIN — ASPIRIN 81 MG: 81 TABLET, COATED ORAL at 09:49

## 2024-09-12 RX ADMIN — ENOXAPARIN SODIUM 30 MG: 100 INJECTION SUBCUTANEOUS at 09:49

## 2024-09-12 RX ADMIN — HYDRALAZINE HYDROCHLORIDE 10 MG: 20 INJECTION INTRAMUSCULAR; INTRAVENOUS at 23:16

## 2024-09-12 RX ADMIN — ATORVASTATIN CALCIUM 80 MG: 80 TABLET, FILM COATED ORAL at 09:49

## 2024-09-12 RX ADMIN — LISINOPRIL 20 MG: 20 TABLET ORAL at 13:41

## 2024-09-12 RX ADMIN — SODIUM CHLORIDE, PRESERVATIVE FREE 10 ML: 5 INJECTION INTRAVENOUS at 10:01

## 2024-09-12 RX ADMIN — CLONIDINE HYDROCHLORIDE 0.1 MG: 0.1 TABLET ORAL at 09:55

## 2024-09-12 ASSESSMENT — PAIN SCALES - GENERAL
PAINLEVEL_OUTOF10: 0
PAINLEVEL_OUTOF10: 0

## 2024-09-12 NOTE — PROGRESS NOTES
V2.0    [facility] Progress Note      Name:  Javier De La Torre /Age/Sex: 1929  (89 y.o. male)   MRN & CSN:  [id number] & [id number] Encounter Date/Time: 2024 8:18 AM EDT   Location:  042/0421-01 PCP: Keyla Juan     Attending:Raoul Menendez MD       Hospital Day: 5    Assessment and Recommendations   Javier De La Torre is a 89 y.o. male with pmh of ypertension, benign prostate hypertrophy, hyperlipidemia.  who presents with Hypotension      Interval Events:   Patient transferred out of CCU last night.  Consulted by cardiology and nephrology, recommendations summarized below.  As needed clonidine 2X yesterday afternoon for hypertension.  Patient consulted, cardiology and nephrology  their recommendations summarized below.  Patient's blood pressure increased yesterday after lisinopril/metoprolol held.    Patient evaluated at bedside.  Seen laying in bed in no acute distress, he had just received his MARGARETTE.  He states he \"passed his ECHO and his heart is good\". Patient denies dizziness, headache, shortness of breath, chest pain.      Plan:   Hypertension   -Nephrology consulted for difficult to manage blood pressures   -Holding lisinopril/metoprolol: Resulted in blood pressure increasing to 170s-200s/80-90s after they were stopped yesterday evening   -Resuming lisinopril    -Holding metoprolol   -Avoiding hydralazine (avoiding vasodilators)       Afib w SVR  -Consulted by cardiology for new onset A-fib with slow ventricular response  - [id number] = 4  - Cardiology recommendations:   -Will pursue rate control, metoprolol held   -Follow-up hemoglobin A1c, thyroid function testing   -ECHO   pending  -Palliative care consulted, as the patient lacks capacity and may not be agreeable to the level of ongoing care that this may require for the rest of his life    Elevated creatinine with questionable acute kidney injury   -Creatinine now at 1.6 GFR, 39  -Will follow-up renal function panel  -Defer to

## 2024-09-12 NOTE — PROGRESS NOTES
"[facility]    History and Physical - Hospitalist Service       Date of Admission:  6/12/2024    Assessment & Plan      Klarissa Curtis is a 50 year old immunocompromised female presenting as a direct admit for evaluation of finger erythema.  She is clinically stable.  Exam raises concern for septic arthritis at the PIP of the right index finger as well as tenosynovitis.  Start IV antibiotics.  MRI pending per orthopaedics to determine surgical approach if necessary.    # Right index PIP septic arthritis, tenosynovitis  - empiric vanc, cefepime  - NPO  - MRI    # Hydradenitis suppurativa  - pregabalin  - duloxetine          Diet: NPO per Anesthesia Guidelines for Procedure/Surgery Except for: Meds  Gomes Catheter: Not present  Lines: None     Cardiac Monitoring: None  Code Status: Full Code    Clinically Significant Risk Factors Present on Admission                              # Obesity: Estimated body mass index is 38.97 kg/m  as calculated from the following:    Height as of an earlier encounter on 6/12/24: 1.702 m (5' 7\").    Weight as of 5/30/24: 112.9 kg (248 lb 12.8 oz).         # Financial/Environmental Concerns:           Disposition Plan     Medically Ready for Discharge:            Ti Piña MD  Hospitalist Service  [facility]  Securely message with [employer] (more info)  Text page via [employer] Paging/Directory     ______________________________________________________________________    Chief Complaint   Finger redness    History is obtained from the patient    History of Present Illness   Klarissa Curtis is a 50 year old female who presents with finger redness.    The patient reports she was in her usual state of health until four days ago.  She woke up and noted a localized redness in her right first index finger at the PIP.  She noted progression over the subsequent days with rapid expansion over last night.  This prompted her to seek medical " Pt totally unaware of limitations regarding mobility. When responding to bed alarm primary RN and  found pt trying to get out of bed to \"get to bathroom\". Pt was offered 4 legged walker which was not useful in this transfer. Primary RN and nurse manager led patient to bathroom and waited in room where they assisted pt back to bed. Bed alarm on, bed in lowest position, wheels locked and call light within reach. Pt educated again on importance of using call light for assistance. Pt with no needs at this time.   attention.  Denies fevers or chills, chest pain, chest pressure, dyspnea, or abdominal pain.      Past Medical History    Past Medical History:   Diagnosis Date    NO ACTIVE PROBLEMS        Past Surgical History   Past Surgical History:   Procedure Laterality Date    BUNIONECTOMY      x2    GRAFT FLAP PEDICLE EXTREMITY (LOCATION) Right 2/29/2024    Procedure: Right axillary wound excision,Right muscle-sparing Latissimus musculo-cutaneous flap, SPY;  Surgeon: BORIS Mendoza MD;  Location: [facility] OR    OTHER SURGICAL HISTORY      right pointer finger foreign body removal       Prior to Admission Medications   Prior to Admission Medications   Prescriptions Last Dose Informant Patient Reported? Taking?   DULoxetine (CYMBALTA) 30 MG capsule   No No   Sig: Take 30mg (1 pill) for 1 week and if tolerating increase to 60mg (2 pills) daily   Resorcinol POWD   No No   Sig: Resorcinol 15% in vanicream twice a day   Vitamin D3 50 mcg (2000 units) tablet   No No   Sig: Take 1 tablet (50 mcg) by mouth daily   adalimumab (HUMIRA *CF*) 80 MG/0.8ML pen kit   No No   Sig: Inject 0.8 mLs (80 mg) Subcutaneous once a week   docusate sodium (COLACE) 100 MG capsule   No No   Sig: Take 1 capsule (100 mg) by mouth 2 times daily   Patient not taking: Reported on 4/17/2024   fexofenadine (ALLEGRA) 180 MG tablet   No No   Sig: Take 1 tablet (180 mg) by mouth 2 times daily   hydrOXYzine HCl (ATARAX) 50 MG tablet   No No   Sig: Take 1 tablet (50 mg) by mouth every 6 hours as needed for other, itching or anxiety (adjuvant pain)   Patient not taking: Reported on 3/13/2024   medical cannabis (Patient's own supply)   Yes No   Sig: See Admin Instructions (The purpose of this order is to document that the patient reports taking medical cannabis.  This is not a prescription, and is not used to certify that the patient has a qualifying medical condition.)   meloxicam (MOBIC) 15 MG tablet   Yes No   Sig: 15 mg daily   naloxone (NARCAN) 4 MG/0.1ML  nasal spray   No No   Sig: Spray 1 spray (4 mg) into one nostril alternating nostrils as needed for opioid reversal every 2-3 minutes until assistance arrives   ondansetron (ZOFRAN ODT) 4 MG ODT tab   No No   Sig: Take 1 tablet (4 mg) by mouth every 8 hours as needed for nausea   Patient not taking: Reported on 4/17/2024   oxyCODONE-acetaminophen (PERCOCET) 5-325 MG tablet   No No   Sig: Take 1-2 percocet every 6-8 hrs as needed for pain   pregabalin (LYRICA) 150 MG capsule   No No   Sig: Take 1 capsule (150 mg) by mouth 2 times daily   pregabalin (LYRICA) 75 MG capsule   No No   Sig: Start 75 mg at bedtime x 1 week, then increase to 75 mg BID x 1 week, then increase to 75 mg in morning and 150 mg at bedtime x 1 week, then increase to 150 mg BID.   Patient not taking: Reported on 4/17/2024   traZODone (DESYREL) 150 MG tablet   No No   Sig: Take 1.5 tablets (225 mg) by mouth at bedtime      Facility-Administered Medications: None           Physical Exam   Vital Signs: Temp: 97.8  F (36.6  C) Temp src: Oral BP: 124/76 Pulse: 68   Resp: 16 SpO2: 97 %      Weight: 0 lbs 0 oz    Gen:  lying in bed in no extremis  Neuro: alert, conversant  CV:  nl rate, regular rhythm  Pulm: no acute resp distress, ctab anteriorly  GI:  abdomen NTTP  MSK:  right index finger with erythema; swelling, tenderness, and limited ROM at the PIP; erythema extends proximally into the hand; demonstrates ROM at the right wrist    Medical Decision Making             Data

## 2024-09-12 NOTE — CARE COORDINATION
[facility] at [facility] states they can accept but wants patient to know he only has 40 SNF days left this benefit period. He is not service connected with [facility] for SN[facility] so he will go under Medicare when medically stable. To skilled rehab when medically stable for d/c. Care being managed by nephrology, cardiology and internal medicine.     Addendum: Spitting up blood/may need heart cath for heart block. MD states palliative consulted also. May eventually need LTC placement after SNF pending outcome of palliative care GOC conversation.

## 2024-09-12 NOTE — PROGRESS NOTES
Physical Therapy  Facility/Department: [facility] C4 PCU  Daily Treatment Note  NAME: Javier De La Torre  : 1929  MRN: [id number]    Date of Service: 2024    Discharge Recommendations:  Subacute/Skilled Nursing Facility   PT Equipment Recommendations  Equipment Needed: No  Other: TBD as pt progresses with therapy -- may require RW if discharging directly home    Patient Diagnosis(es): The primary encounter diagnosis was Hypotension, unspecified hypotension type. Diagnoses of Renal insufficiency and Atrial fibrillation, unspecified type (HCC) were also pertinent to this visit.    Assessment  Assessment: Pt participated well when seen for PT follow-up this date.  Pt progressing well since last seen by PT, now performing functional mobility tasks with CGA x 1 and ambulating increased distance (150 feet) with CGA x 1.  Pt with very accelerated gait today, requiring cues to amb more slowly/carefully at times -- especially when navigating turns.  Pt still requiring RW for added stability/support when amb.  Pt limited in overall strength and level of (I) with mobility compared to baseline LOF and would benefit from continued PT in acute setting.  Recommend SNF vs. 24-hr sup/assist and home PT at D/C.  If 24-hr not available at home, pt would benefit from SNF.  Activity Tolerance: Patient tolerated treatment well  Equipment Needed: No  Other: TBD as pt progresses with therapy -- may require RW if discharging directly home    Plan  Physical Therapy Plan  General Plan: 3-5 times per week  Specific Instructions for Next Treatment: Progress ther ex and mobility as tolerated, gait training progressing to cane if possible  Current Treatment Recommendations: Strengthening;Balance training;Functional mobility training;Transfer training;Endurance training;Gait training;Neuromuscular re-education;Home exercise program;Safety education & training;Patient/Caregiver education & training;Equipment evaluation, education, &

## 2024-09-12 NOTE — PROGRESS NOTES
[facility] Progress Note           Subjective/   89 y.o. year old male who we are seeing in consultation for HTN, CKD stage 3b.     HPI:  Bp's higher today.  Renal function stable, non-oliguric.    ROS:  +lightheaded, intake adequate.    Objective/   GEN:  Chronically ill, BP (!) 170/81 Comment: RN Aware  Pulse 74   Temp 100.3 °F (37.9 °C) (Oral)   Resp 14   Ht 1.829 m (6')   Wt 67.1 kg (148 lb)   SpO2 94%   BMI 20.07 kg/m²   HEENT: non-icteric, no JVD  CV: S1, S2 without m/r/g; no LE edema  RESP: CTA B without w/r/r; breathing wnl  ABD: +bs, soft, nt, no hsm  SKIN: warm, no rashes    Data/  No results for input(s): \"WBC\", \"HGB\", \"HCT\", \"MCV\", \"PLT\" in the last 72 hours.  Recent Labs     09/10/24  0414 09/10/24  2348 09/11/24  0412 09/12/24  0454    141 138 140   K 4.0 3.8 3.6 3.8    108 106 106   CO2 23 24 23 23   GLUCOSE 118* 104* 104* 108*   PHOS 2.6  --  2.5 2.7   MG  --  1.30*  --   --    BUN 32* 43* 41* 42*   CREATININE 1.3 1.5* 1.5* 1.6*   LABGLOM 51* 43* 43* 39*       Assessment/     - Hypertension - labile     - Chronic kidney disease stage 3b - background HTN +/- Urinary retention                Baseline SCr variable 1.2-1.5 range at its best, as high as ~2 range in past     - Urinary retention - ferrell now out and urinating well    Plan/     - BP control with resuming of lisinopril today, off metoprolol per Cardiology, avoid vasodilators as able (aka hydralazine)  - Okay for CTA if needed - case d/w Cardiology  - Trend labs, bp's, weights, & urine output     ____________________________________  Abdirashid Butt, MD  [facility]  [email or web address]  Office: [phone]

## 2024-09-12 NOTE — PROGRESS NOTES
agreeable to OT treatment with encouragement.    Orientation  Overall Orientation Status: Impaired  Orientation Level: Oriented to person;Oriented to place;Disoriented to situation;Oriented to time    Pain: Pt c/o pain in R ankle/foot, not rated, reports \"it's like the arthritis I get in my fingers.\"    Cognition  Overall Cognitive Status: Exceptions  Arousal/Alertness: Appears intact  Following Commands: Follows one step commands with increased time;Follows one step commands with repetition  Attention Span: Difficulty dividing attention;Attends with cues to redirect  Memory: Decreased recall of recent events;Decreased short term memory  Safety Judgement: Decreased awareness of need for assistance;Decreased awareness of need for safety  Problem Solving: Impaired  Insights: Not aware of deficits  Initiation: Requires cues for some  Sequencing: Does not require cues       Objective  Vitals  Vitals  Pulse: 77  SpO2: 97 %  O2 Device: None (Room air)  BP: (!) 180/89  MAP (Calculated): 119  BP Location: Left upper arm  BP Method: Automatic  [facility]    Bed Mobility Training  Bed Mobility Training: Yes  Overall Level of Assistance: Contact-guard assistance;Additional time;Adaptive equipment;Moderate assistance  Interventions: Safety awareness training;Verbal cues  Supine to Sit: Stand-by assistance;Adaptive equipment  Sit to Supine: Stand-by assistance;Adaptive equipment  Scooting: Stand-by assistance    Balance  Sitting: Intact  Standing: Impaired (SW)  Standing - Static: Fair;Constant support  Standing - Dynamic: Fair;Constant support    Transfer Training  Transfer Training: Yes (SW)  Interventions: Safety awareness training;Verbal cues;Visual cues  Sit to Stand: Contact-guard assistance  Stand to Sit: Contact-guard assistance       ADL  Grooming: Contact guard assistance;Minimal assistance  Grooming Skilled Clinical Factors: in stance at sink for oral hygiene and washing face, pt leaning heavily  clothing?: A Little  How much help is needed for taking care of personal grooming?: A Little  How much help for eating meals?: None  AM-PAC Inpatient Daily Activity Raw Score: 14  AM-PAC Inpatient ADL T-Scale Score : 33.39  ADL Inpatient CMS 0-100% Score: 59.67  ADL Inpatient CMS G-Code Modifier : CK    Therapy Time   Individual Concurrent Group Co-treatment   Time In 1038         Time Out 1101         Minutes 23                 ANDRES Velasco/ANALILIA

## 2024-09-12 NOTE — PROGRESS NOTES
[facility]     Electrophysiology                                     Progress Note    Admission date:  2024    Reason for follow up visit: AF/AFL    HPI/CC: Javier De La Torre was admitted on 2024 with failure to thrive and difficulties caring for himself. Workup revealed AF with slow ventricular response and AFL. EP was consulted.  Rhythm has been sinus with intermittent AF and AFL.     Subjective: He has no complaints at the time of my visit. Denies chest pain, palpitations, shortness of breath, and dizziness.       Vitals:  Blood pressure (!) 180/89, pulse 77, temperature 99 °F (37.2 °C), temperature source Oral, resp. rate 14, height 1.829 m (6'), weight 67.1 kg (148 lb), SpO2 97%.  Temp  Av.7 °F (37.1 °C)  Min: 98.1 °F (36.7 °C)  Max: 99 °F (37.2 °C)  Pulse  Av.3  Min: 55  Max: 77  BP  Min: 104/51  Max: 206/65  SpO2  Av %  Min: 94 %  Max: 99 %    24 hour I/O    Intake/Output Summary (Last 24 hours) at 2024 1126  Last data filed at 2024 0430  Gross per 24 hour   Intake 770.31 ml   Output 1000 ml   Net -229.69 ml     Current Facility-Administered Medications   Medication Dose Route Frequency Provider Last Rate Last Admin    enoxaparin Sodium (LOVENOX) injection 30 mg  30 mg SubCUTAneous Daily Raoul Menendez MD   30 mg at 24 0949    perflutren lipid microspheres (DEFINITY) injection 1.5 mL  1.5 mL IntraVENous ONCE PRN SLADE Torres Jr., MD        mupirocin (BACTROBAN) 2 % ointment   Each Nostril BID Raoul Menendez MD   Given at 24    hydrALAZINE (APRESOLINE) injection 10 mg  10 mg IntraVENous Q6H PRN Gregorio Hernandez APRN - CNP   10 mg at 24    [Held by provider] lisinopril (PRINIVIL;ZESTRIL) tablet 20 mg  20 mg Oral Daily Deepak Lei MD   20 mg at 24 0858    cloNIDine (CATAPRES) tablet 0.1 mg  0.1 mg Oral Q4H PRN Raoul Menendez MD   0.1 mg at 24 0955    aspirin EC tablet 81 mg  81 mg Oral Daily Mehul Parham MD   found for: \"PTT\"   Lab Results   Component Value Date/Time    MG 1.30 09/10/2024 11:48 PM    No results found for: \"TSH\"    Assessment:  Paroxysmal atrial arrhythmias (AF/AFL): ongoing, noted to have slow ventricular response   -[id number] score 4 (age, HTN, CHF)    -s/p FEROZ clip 2016   -patient noted to have AF post CABG   CAD s/p CABG   HFpEF: compensated   HTN: uncontrolled  HLD   History of melanoma  Normocytic anemia  Chronic renal insufficiency  Urinary tract obstruction with indwelling ferrell catheter  Failure to thrive       Plan:   1. Continue ASA, statin, and lisinopril   2. Cardiac CTA to evaluate left atrial appendage clip. Discussed with nephrology, their team is okay with CTA as renal function is at baseline. If adequately clipped, would not recommend long term anticoagulation.   3. Rates controlled in AF/AFL. Would not resume metoprolol at this time.       Sophia Merida, APRN-CNP  [facility]  [phone]

## 2024-09-12 NOTE — CONSULTS
durable medical power of           Palliative Performance Scale:     [] 60%  Amb reduced; Sig dz. Can't do hobbies/housework; Intake normal or reduced, Occasional assist; LOC full/confusion   [x] 50%  Mainly sit/lie; Extensive disease. Mainly assist, Intake normal or reduced; Occasional assist; LOC full/confusion   [] 40%  Mainly in bed; Extensive disease; Mainly assist; Intake normal or reduced; Occasional assist; LOC full/confusion   [] 30%  Bed bound, Extensive disease; Total care; Intake reduced; LOC full/confusion   [] 20%  Bed bound; Extensive disease; Total care; Intake minimal; Drowsy/coma   [] 10%  Bed bound; Extensive disease; Total care; Mouth care only; Drowsy/coma   []  0%   Death       Home med list and hospital medications reviewed in chart as of 9/12/2024     EXAM     Vitals:    09/12/24 1200   BP: (!) 170/81   Pulse: 74   Resp: 14   Temp: 100.3 °F (37.9 °C)   SpO2: 94%                OBJECTIVE   BP (!) 170/81 Comment: RN Aware  Pulse 74   Temp 100.3 °F (37.9 °C) (Oral)   Resp 14   Ht 1.829 m (6')   Wt 67.1 kg (148 lb)   SpO2 94%   BMI 20.07 kg/m²   I/O last 3 completed shifts:  In: 980.3 [P.O.:440; I.V.:10; IV Piggyback:530.3]  Out: 2075 [Urine:2075]  I/O this shift:  In: -   Out: 575 [Urine:575]      Palliative Medicine Interventions:    patient/family support  Goals of Care discussions with patient/surrogate  Spiritual Interventions: none identified          DATA:  Current labs in the epic chart reviewed as of 9/12/2024   Review of previous notes, admits, labs, radiology and testing relevant to this consult done in this chart today 9/12/2024  Review of advance directives (if any) in chart    Medical Decision Making:  The following items were considered in medical decision making:  Review of prior external note(s) from each unique source relevant to today's visit: Hospitalist, Case management, PT/OT/ST, cardiology, EP, nephrology  Discussion of management or test with external  physician/qualified health care professional: Hospitalist, Case management,    Unique test results reviewed: CBC and BMP, Nutrition labs, Hepatic studies, cardiac labs, CXR, echo,        Risk of Complications/Morbidity: High   Illness(es)/ Infection present that pose threat to bodily function.   There is potential for severe exacerbation of condition/side effects of treatment.                       Signed By: Electronically signed by GEENA Alexandre CNP on 9/12/2024 at 1:34 PM  Palliative Medicine   [phone]    September 12, 2024

## 2024-09-12 NOTE — PLAN OF CARE
Problem: Safety - Adult  Goal: Free from fall injury  9/11/2024 2221 by Bang Clark RN  Outcome: Progressing  9/11/2024 1815 by Behzad Fofana RN  Outcome: Progressing  9/11/2024 1320 by Maya Gutierrez RN  Outcome: Progressing  Flowsheets (Taken 9/11/2024 1100)  Free From Fall Injury: Instruct family/caregiver on patient safety     Problem: Discharge Planning  Goal: Discharge to home or other facility with appropriate resources  9/11/2024 2221 by Bang Clark RN  Outcome: Progressing  9/11/2024 1815 by Behzad Fofana RN  Outcome: Progressing  9/11/2024 1320 by Maya Gutierrez RN  Outcome: Progressing  Flowsheets (Taken 9/11/2024 0800)  Discharge to home or other facility with appropriate resources:   Identify barriers to discharge with patient and caregiver   Arrange for needed discharge resources and transportation as appropriate   Identify discharge learning needs (meds, wound care, etc)   Refer to discharge planning if patient needs post-hospital services based on physician order or complex needs related to functional status, cognitive ability or social support system     Problem: Skin/Tissue Integrity  Goal: Absence of new skin breakdown  Description: 1.  Monitor for areas of redness and/or skin breakdown  2.  Assess vascular access sites hourly  3.  Every 4-6 hours minimum:  Change oxygen saturation probe site  4.  Every 4-6 hours:  If on nasal continuous positive airway pressure, respiratory therapy assess nares and determine need for appliance change or resting period.  9/11/2024 2221 by Bang Clark RN  Outcome: Progressing  9/11/2024 1815 by Behzad Foafna RN  Outcome: Progressing  9/11/2024 1320 by Maya Gutierrez RN  Outcome: Progressing     Problem: ABCDS Injury Assessment  Goal: Absence of physical injury  9/11/2024 2221 by Bang Clark RN  Outcome: Progressing  9/11/2024 1815 by Behzad Fofana RN  Outcome: Progressing  9/11/2024 1320 by Maya Gutierrez RN  Outcome: Progressing  Flowsheets

## 2024-09-13 ENCOUNTER — APPOINTMENT (OUTPATIENT)
Dept: CT IMAGING | Age: 89
DRG: 312 | End: 2024-09-13
Payer: OTHER GOVERNMENT

## 2024-09-13 LAB
ALBUMIN SERPL-MCNC: 3.5 G/DL (ref 3.4–5)
ANION GAP SERPL CALCULATED.3IONS-SCNC: 10 MMOL/L (ref 3–16)
BUN SERPL-MCNC: 37 MG/DL (ref 7–20)
CALCIUM SERPL-MCNC: 9 MG/DL (ref 8.3–10.6)
CHLORIDE SERPL-SCNC: 106 MMOL/L (ref 99–110)
CO2 SERPL-SCNC: 23 MMOL/L (ref 21–32)
CREAT SERPL-MCNC: 1.4 MG/DL (ref 0.8–1.3)
GFR SERPLBLD CREATININE-BSD FMLA CKD-EPI: 46 ML/MIN/{1.73_M2}
GLUCOSE SERPL-MCNC: 105 MG/DL (ref 70–99)
PHOSPHATE SERPL-MCNC: 2.6 MG/DL (ref 2.5–4.9)
POTASSIUM SERPL-SCNC: 3.8 MMOL/L (ref 3.5–5.1)
SODIUM SERPL-SCNC: 139 MMOL/L (ref 136–145)

## 2024-09-13 PROCEDURE — 75574 CT ANGIO HRT W/3D IMAGE: CPT

## 2024-09-13 PROCEDURE — 99222 1ST HOSP IP/OBS MODERATE 55: CPT | Performed by: NURSE PRACTITIONER

## 2024-09-13 PROCEDURE — 97110 THERAPEUTIC EXERCISES: CPT

## 2024-09-13 PROCEDURE — 80069 RENAL FUNCTION PANEL: CPT

## 2024-09-13 PROCEDURE — 97116 GAIT TRAINING THERAPY: CPT

## 2024-09-13 PROCEDURE — 6370000000 HC RX 637 (ALT 250 FOR IP): Performed by: INTERNAL MEDICINE

## 2024-09-13 PROCEDURE — 6360000002 HC RX W HCPCS: Performed by: INTERNAL MEDICINE

## 2024-09-13 PROCEDURE — 97530 THERAPEUTIC ACTIVITIES: CPT

## 2024-09-13 PROCEDURE — 6370000000 HC RX 637 (ALT 250 FOR IP)

## 2024-09-13 PROCEDURE — 99232 SBSQ HOSP IP/OBS MODERATE 35: CPT | Performed by: NURSE PRACTITIONER

## 2024-09-13 PROCEDURE — 36415 COLL VENOUS BLD VENIPUNCTURE: CPT

## 2024-09-13 PROCEDURE — 6360000004 HC RX CONTRAST MEDICATION: Performed by: NURSE PRACTITIONER

## 2024-09-13 PROCEDURE — 2060000000 HC ICU INTERMEDIATE R&B

## 2024-09-13 PROCEDURE — 6360000002 HC RX W HCPCS: Performed by: NURSE PRACTITIONER

## 2024-09-13 PROCEDURE — 2580000003 HC RX 258: Performed by: INTERNAL MEDICINE

## 2024-09-13 RX ORDER — ENOXAPARIN SODIUM 100 MG/ML
40 INJECTION SUBCUTANEOUS DAILY
Status: DISCONTINUED | OUTPATIENT
Start: 2024-09-13 | End: 2024-09-17 | Stop reason: HOSPADM

## 2024-09-13 RX ORDER — HYDRALAZINE HYDROCHLORIDE 20 MG/ML
10 INJECTION INTRAMUSCULAR; INTRAVENOUS ONCE
Status: COMPLETED | OUTPATIENT
Start: 2024-09-13 | End: 2024-09-13

## 2024-09-13 RX ORDER — LEVOTHYROXINE SODIUM 25 UG/1
25 TABLET ORAL DAILY
Status: DISCONTINUED | OUTPATIENT
Start: 2024-09-13 | End: 2024-09-17 | Stop reason: HOSPADM

## 2024-09-13 RX ORDER — IOPAMIDOL 755 MG/ML
100 INJECTION, SOLUTION INTRAVASCULAR
Status: COMPLETED | OUTPATIENT
Start: 2024-09-13 | End: 2024-09-13

## 2024-09-13 RX ADMIN — CLONIDINE HYDROCHLORIDE 0.1 MG: 0.1 TABLET ORAL at 05:47

## 2024-09-13 RX ADMIN — LISINOPRIL 20 MG: 20 TABLET ORAL at 13:40

## 2024-09-13 RX ADMIN — HYDRALAZINE HYDROCHLORIDE 10 MG: 20 INJECTION INTRAMUSCULAR; INTRAVENOUS at 01:11

## 2024-09-13 RX ADMIN — IOPAMIDOL 100 ML: 755 INJECTION, SOLUTION INTRAVENOUS at 18:14

## 2024-09-13 RX ADMIN — ASPIRIN 81 MG: 81 TABLET, COATED ORAL at 13:39

## 2024-09-13 RX ADMIN — ENOXAPARIN SODIUM 40 MG: 100 INJECTION SUBCUTANEOUS at 13:40

## 2024-09-13 RX ADMIN — SODIUM CHLORIDE, PRESERVATIVE FREE 10 ML: 5 INJECTION INTRAVENOUS at 11:00

## 2024-09-13 RX ADMIN — SODIUM CHLORIDE, PRESERVATIVE FREE 10 ML: 5 INJECTION INTRAVENOUS at 20:39

## 2024-09-13 RX ADMIN — CLONIDINE HYDROCHLORIDE 0.1 MG: 0.1 TABLET ORAL at 16:09

## 2024-09-13 RX ADMIN — ATORVASTATIN CALCIUM 80 MG: 80 TABLET, FILM COATED ORAL at 13:39

## 2024-09-13 RX ADMIN — LEVOTHYROXINE SODIUM 25 MCG: 0.03 TABLET ORAL at 14:39

## 2024-09-13 ASSESSMENT — PAIN SCALES - GENERAL: PAINLEVEL_OUTOF10: 0

## 2024-09-13 NOTE — PROGRESS NOTES
V2.0    [facility] Progress Note      Name:  Javier De La Torre /Age/Sex: 1929  (89 y.o. male)   MRN & CSN:  [id number] & [id number] Encounter Date/Time: 2024 8:18 AM EDT   Location:  [facility]042-01 PCP: Keyla Juan     Attending:Raoul Menendez MD       Hospital Day: 6    Assessment and Recommendations   Javier De La Torre is a 89 y.o. male with pmh of ypertension, benign prostate hypertrophy, hyperlipidemia.  who presents with Hypotension      Interval Events:   Patient observed sitting in chair this morning. Consulted by palliative care yesterday to assess capacity and understanding of his treatment course in hospital.  PRN clonidine and hydralazine given for HTN.  Endorses lightheadedness.  Scheduled for cardiac CTA today to evaluate left atrial appendage.  Psych consulted today to assess capacity.      Plan:   Hypertension   -Nephrology consulted for difficult to manage blood pressures  -Continue lisinopril   -Holding metoprolol  -Avoiding hydralazine (avoiding vasodilators)  -Flomax discontinued      Capacity assessment  -Patient does not seem to fully acknowledge the severity of his conditions  (denying the need for therapy postdischarge' insists that there is \"nothing wrong with him\")  -Palliative care following  -Psychiatry consulted, pending their recommendations      Afib w SVR  -Consulted by cardiology for new onset A-fib with slow ventricular response  - [id number] = 4  -EP following, pending results of cardiac CTA for evaluation of left atrial appendage    ECHO   :   Left Ventricle: EF of 55 - 60%. Left ventricle is smaller than normal.  Mildly increased wall thickness. Findings consistent with concentric hypertrophy. Normal wall motion. Grade III diastolic dysfunction with increased LAP.     Right Ventricle: Severe RV dilation and pulmonary HTN  -Continue atorvastatin  -Continue aspirin 81 mg    CKD-3B  -Creatinine now at 1.6 GFR, 39  -Will follow-up renal function panel  -Unclear  bradycardia 53, patient said he has been eating and drinking okay, he knew he is in the hospital but did not know the name, he knew it September but not the exact date.  However he knew his birthday and his current age.  He said he has 2 daughters who helps him usually and [facility] and has been improving with his strength.  Of note patient is on multiple medication at home including hydralazine Lopressor and lisinopril all may lower his blood pressure and heart rate.    Patient requested to be a full code said he has 2 daughter but not sure if he has any one of them is the durable medical power of   Review of Systems:      Review of Systems   Neurological:  Positive for light-headedness.   All other systems reviewed and are negative.       Objective:     Intake/Output Summary (Last 24 hours) at 9/13/2024 1013  Last data filed at 9/13/2024 0607  Gross per 24 hour   Intake 440 ml   Output 1875 ml   Net -1435 ml      Vitals:   Vitals:    09/13/24 0036 09/13/24 0352 09/13/24 0540 09/13/24 0800   BP:  (!) 176/86 (!) 180/80 (!) 173/79   Pulse:  72 74 71   Resp:  18  18   Temp: 97.8 °F (36.6 °C) 97.6 °F (36.4 °C)  97.3 °F (36.3 °C)   TempSrc:  Oral  Oral   SpO2:  96%  94%   Weight:       Height:             Physical Exam:      Physical Exam  Constitutional:       Appearance: He is normal weight.   HENT:      Head: Normocephalic and atraumatic.   Eyes:      Extraocular Movements: Extraocular movements intact.      Conjunctiva/sclera: Conjunctivae normal.      Pupils: Pupils are equal, round, and reactive to light.   Cardiovascular:      Pulses: Normal pulses.      Heart sounds: Normal heart sounds.   Pulmonary:      Effort: Pulmonary effort is normal.      Breath sounds: Normal breath sounds.   Abdominal:      General: Abdomen is flat. Bowel sounds are normal.      Palpations: Abdomen is soft.   Neurological:      Mental Status: He is alert.   Psychiatric:      Comments: Poor insight

## 2024-09-13 NOTE — CONSULTS
Psychiatric consult complete. Chart was reviewed, discussed with attending physician who states that Javier has been refusing placement and seems to struggle with discussion of treatment plan/goals of care, and patient was seen.     Attempted to complete an Aid to Capacity Evaluation but unable to complete it in its entirety as Javier kept saying \"I talked to someone else yesterday who asked me all these questions. You should talk to her\". He acknowledges that he was recently at a skilled nursing facility for rehab and that he graduated and was sent home. He does not want to go back to one at this time as he had to share a bathroom at the last one, which caused him to have accidents if the other person was in it when he needed it. When asked if there were any risks versus benefits of placement, he declined to answer but it is unknown if this is due to lack of understanding or him just not wanting to discuss the topic. He did identify that should he go home, he could fall but \"I won't because I have a walker and I don't walk like I used to. If I fell, I have neighbors that help me out and I live close to the fire station\". He was able to state that if he fell, \"it could be a bad fall and I could hit my head\". Declined to discuss it further; unknown if this is due to lack of insight into the possible severity of a fall or if he just did not want to discuss it with me.     Discussed above with attending MD.     Diagnosis: GREG    Recommendations:  Unable to do full capacity assessment due to reasons noted above. Based on the assessment that I could to, it is difficult to determine if Javier lacks understanding of situation and its severity, or if he frankly just has no interest in discussing it. Recommend getting legal team involved if issues determining capacity continue.   Dr. Ibrahim is back next week. Should Javier still be admitted at that time, will ask him to follow-up.   Full psychiatric consult note to

## 2024-09-13 NOTE — CARE COORDINATION
Ongoing evaluation with question of patient's capacity. Patient refuses to go to rehab or let home care in. Palliative RN requested a capacity eval. [facility] accepted under Medicare benefit if patient will go? He is not service connected for SN[facility]. However he only has 40 Medicare days left. Daughter's are reluctant to be involved per palliative note.Will await outcome regarding capacity and continue to assist with discharge planning.

## 2024-09-13 NOTE — PROGRESS NOTES
PALLIATIVE MEDICINE PROGRESS NOTE     Patient name:Javier De La Torre    MRN:[id number] :1929  Room/Bed:[facility]0421-01    LOS: 5 days        ASSESSMENT/RECOMMENDATIONS     89 y.o. male from home alone admitted with urinary retention and difficulty managing his ferrell bag at home.  Appears he is having overall functional decline at home, but refuses to be in a SNF.  Since admission, patient has had labile blood pressures, atrial fibrillation with SVR, AURELIO and some possible confusion.  Determining goals of care has been very challenging due to possible confusion, difficulty in hearing and personality issues.   Per family, he has a known history of being obstinate and difficult.  He has been assessed by psychiatry for capacity, but the question still remains if he lacks capacity or just has no interest in discussing the topic.        Goals of care:  Undetermined at this time.  Patient's goals do not seem consistent and despite having a psych eval, there is still question about his capacity for decision making.  There is no HPOA.  All 3 of patient's children favor SNF/LTC with conservative medical management only, but only the son, Shanna, is willing to consider being an actual decision maker.  Risk management also informed.  Will follow up on Monday      Patient/Family Goals of Care :    24    Called and spoke with daughter, Mariel, by phone.  Updated her about psych's assessment and that we are still uncertain of patient's capacity to make decisions about his health care.  We spent time talking about patient's different health issues including his AF.  We talked about the atrial clip procedure and the risks of blood thinners.  Mariel states patient needs to go into LTC, but he is stubborn and will not agree to it. He won't listen to her.  She became very tearful discussing his care, and says she feels like he needs to have his symptoms managed without any invasive medical procedures.  She wants him to have a

## 2024-09-13 NOTE — PROGRESS NOTES
[facility] Progress Note           Subjective/   89 y.o. year old male who we are seeing in consultation for HTN, CKD stage 3b.     HPI:  Bp's higher today.  Renal function stable, non-oliguric.    ROS:  +lightheaded, intake adequate.    Objective/   GEN:  Chronically ill, BP (!) 191/89   Pulse 74   Temp 98 °F (36.7 °C) (Oral)   Resp 18   Ht 1.829 m (6')   Wt 67.1 kg (148 lb)   SpO2 95%   BMI 20.07 kg/m²   HEENT: non-icteric, no JVD  CV: S1, S2 without m/r/g; no LE edema  RESP: CTA B without w/r/r; breathing wnl  ABD: +bs, soft, nt, no hsm  SKIN: warm, no rashes    Data/  No results for input(s): \"WBC\", \"HGB\", \"HCT\", \"MCV\", \"PLT\" in the last 72 hours.  Recent Labs     09/10/24  2348 09/11/24  0412 09/12/24  0454 09/13/24  0456    138 140 139   K 3.8 3.6 3.8 3.8    106 106 106   CO2 24 23 23 23   GLUCOSE 104* 104* 108* 105*   PHOS  --  2.5 2.7 2.6   MG 1.30*  --   --   --    BUN 43* 41* 42* 37*   CREATININE 1.5* 1.5* 1.6* 1.4*   LABGLOM 43* 43* 39* 46*       Assessment/     - Hypertension - labile     - Chronic kidney disease stage 3b - background HTN +/- Urinary retention                Baseline SCr variable 1.2-1.5 range at its best, as high as ~2 range in past     - Urinary retention - requiring ferrell PTA    Plan/     - BP control with resuming of lisinopril, prn clonidine, off metoprolol per Cardiology, avoid vasodilators as able (aka hydralazine)  - Okay for CTA if needed - case d/w Cardiology  - Ferrell mgmt  - Trend labs, bp's, weights, & urine output     ____________________________________  Abdirashid Butt, MD  [facility]  [email or web address]  Office: [phone]

## 2024-09-13 NOTE — PROGRESS NOTES
This RN made aware that HR went down to 38-40. Afib slowed ventricular response to Aflutter then converted  back to Accelerated junctional with right bundle branch block on telemetry. HR 50's.Patient is asymptomatic on assessment. /77. PRN hydralazine given.

## 2024-09-13 NOTE — CONSULTS
Consult Placed     Who: Dr. Ibrahim  Date:  Time:     Electronically signed by Yohana Perkins on 9/13/2024 at 7:33 AM

## 2024-09-13 NOTE — PROGRESS NOTES
Physical Therapy  Facility/Department: [facility] C4 PCU  Daily Treatment Note  NAME: Javier De La Torre  : 1929  MRN: [id number]    Date of Service: 2024    Discharge Recommendations:  Subacute/Skilled Nursing Facility   PT Equipment Recommendations  Equipment Needed: No  Other: TBD as pt progresses with therapy -- may require RW if discharging directly home    Patient Diagnosis(es): The primary encounter diagnosis was Hypotension, unspecified hypotension type. Diagnoses of Renal insufficiency and Atrial fibrillation, unspecified type (HCC) were also pertinent to this visit.    Assessment  Assessment: Pt participated well when seen for PT follow-up this date.  Pt progressing well since last seen by PT, now performing functional mobility tasks with CGA x 1 and ambulating increased distance of 175'X 2 with CGA x 1 with RW..  Pt with flucctuating gait speed today, requiring cues to amb more slowly/carefully at times -- especially when navigating turns.  Pt still requiring RW for added stability/support when amb.  Pt limited in overall strength and level of (I) with mobility compared to baseline LOF and would benefit from continued PT in acute setting.  Recommend SNF at D/C  Activity Tolerance: Patient tolerated treatment well  Equipment Needed: No  Other: TBD as pt progresses with therapy -- may require RW if discharging directly home    Plan  Physical Therapy Plan  General Plan: 3-5 times per week  Specific Instructions for Next Treatment: Progress ther ex and mobility as tolerated, gait training progressing to cane if possible  Current Treatment Recommendations: Strengthening;Balance training;Functional mobility training;Transfer training;Endurance training;Gait training;Neuromuscular re-education;Home exercise program;Safety education & training;Patient/Caregiver education & training;Equipment evaluation, education, & procurement;Therapeutic  equipment (grab bars and RW)  Gait  Gait Training: Yes  Overall Level of Assistance: Contact-guard assistance  Distance (ft): 175 Feet (X 2 with seated rest)  Assistive Device: Walker, rolling;Gait belt  Interventions: Verbal cues;Safety awareness training;Tactile cues;Manual cues  Base of Support: Widened  Speed/Alisson: Fluctuations  Step Length: Right shortened;Left shortened  Gait Abnormalities: Decreased step clearance;Path deviations     PT Exercises  Exercise Treatment: Seated : AP, LAQ, marches, shld flex, elbow flex X 15 -20 each     Safety Devices  Type of Devices: Call light within reach;Nurse notified;Gait belt;Chair alarm in place;Left in chair;Telesitter in use      Goals  Short Term Goals  Time Frame for Short Term Goals: 7 days (9/17) unless otherwise noted.; 9/13 con't goals  Short Term Goal 1: Pt will demonstrate SBA with bed mobility - MET 9/11/24  Short Term Goal 2: Pt will demonstrate SBA with functional transfers with LRAD with safe sequencing.  Short Term Goal 3: Pt will demonstrate CGA for ambulation up to 150ft with LRAD and safe sequencing of AD - met for distance and assist level on 9/11/24, although pt still requiring cues for safety with direction changes/turns  Short Term Goal 4: Pt will participate in 4 different BLE exercises of 12-15 reps each to improve strength and endurance by 9/13.  Patient Goals   Patient Goals : \"to go home\"    Education  Patient Education  Education Given To: Patient  Education Provided: Role of Therapy;Plan of Care;Precautions;Transfer Training;Fall Prevention Strategies;Equipment  Education Provided Comments: Pt educated on role of PT in acute setting and benefits of regular OOB activity in order to maintain/improve strength and lessen likelihood of developing hospital-acquired weakness; pt verbalizes understanding.  Education Method: Demonstration;Verbal  Barriers to Learning: Cognition  Education Outcome: Verbalized understanding;Demonstrated

## 2024-09-13 NOTE — PROGRESS NOTES
Physician Progress Note      PATIENT:               MIKE DEVLIN  [facility] #:                  [id number]  :                       1929  ADMIT DATE:       2024 2:31 AM  DISCH DATE:  RESPONDING  PROVIDER #:        ADARSH CURRY          QUERY TEXT:    Patient admitted with hypotension.  Noted documentation of Acute Kidney Injury   in  IM note.  In order to support the diagnosis of AURELIO, please include   additional clinical indicators in your documentation.? Or please document if   the diagnosis of AURELIO has been ruled out after further study.    The medical record reflects the following:  Risk Factors:  89 y.o male with PMHx of significant for hypertension, benign   prostate hypertrophy, hyperlipidemia.  Clinical Indicators:  - IM H+P - Elevated creatinine with questionable   acute kidney injury, last baseline was from , his last creatinine around   30 hours ago was 1.7 repeat now is 2.0, placed on IV hydration and improved   blood pressure and reevaluate in the morning could have been a component of   hypotension and poor oral intake.  Also could be related response to his   urinary retention that he presented to the emergency room with on .  -   IM - AURELIO.  Creatinine elevated over baseline.  Creatinine was 2.0.  Continue   fluids.  Monitor closely.  - Nephro - Chronic k  2 range in past.  - Labs: Cr - 2 > 1.5 > 1.2 >1.3 > 1.5 > 1.5 > 1.6  Treatment: Nephro consult, serial BMP    Defined by Kidney Disease Improving Global Outcomes (KDIGO) clinical practice   guideline for acute kidney injury:  -Increase in SCr by greater than or equal to 0.3 mg/dl within 48 hours; or  -Increase or decrease in SCr to greater than or equal to 1.5 times baseline,   which is known or presumed to have occurred within the prior 7 days; or  -Urine volume < 0.5ml/kg/h for 6 hours.  Options provided:  -- Acute kidney injury ruled out after study  -- This patient has an Acute kidney injury evidenced by, Please

## 2024-09-13 NOTE — PLAN OF CARE
Problem: Safety - Adult  Goal: Free from fall injury  9/12/2024 2245 by Stephanie Asencio, RN  Outcome: Progressing  9/12/2024 1828 by Behzad Fofana, RN  Outcome: Progressing

## 2024-09-13 NOTE — PROGRESS NOTES
[facility]     Electrophysiology                                     Progress Note    Admission date:  2024    Reason for follow up visit: AF/AFL    HPI/CC: Javier De La Torre was admitted on 2024 with failure to thrive and difficulties caring for himself. Workup revealed AF with slow ventricular response and AFL. EP was consulted.  Rhythm has been sinus with intermittent AF and AFL.     Subjective: He has no complaints at the time of my visit. Denies chest pain, palpitations, shortness of breath, and dizziness. He is getting picked up to be taken to CT scanner.       Vitals:  Blood pressure (!) 173/79, pulse 71, temperature 97.3 °F (36.3 °C), temperature source Oral, resp. rate 18, height 1.829 m (6'), weight 67.1 kg (148 lb), SpO2 94%.  Temp  Av °F (36.7 °C)  Min: 97.3 °F (36.3 °C)  Max: 99 °F (37.2 °C)  Pulse  Av.7  Min: 54  Max: 74  BP  Min: 136/63  Max: 208/75  SpO2  Av %  Min: 94 %  Max: 97 %    24 hour I/O    Intake/Output Summary (Last 24 hours) at 2024 1319  Last data filed at 2024 0607  Gross per 24 hour   Intake 440 ml   Output 1875 ml   Net -1435 ml     Current Facility-Administered Medications   Medication Dose Route Frequency Provider Last Rate Last Admin    enoxaparin (LOVENOX) injection 40 mg  40 mg SubCUTAneous Daily Raoul Menendez MD        levothyroxine (SYNTHROID) tablet 25 mcg  25 mcg Oral Daily Deepak Lei MD        iopamidol (ISOVUE-370) 76 % injection 100 mL  100 mL IntraVENous ONCE PRN Sophia Merida, APRN - CNP        lisinopril (PRINIVIL;ZESTRIL) tablet 20 mg  20 mg Oral Daily Abdirashid Toledo MD   20 mg at 24 1341    perflutren lipid microspheres (DEFINITY) injection 1.5 mL  1.5 mL IntraVENous ONCE PRN SLADE Torres Jr., MD        mupirocin (BACTROBAN) 2 % ointment   Each Nostril BID Raoul Menendez MD   Given at 09/11/24 2120    hydrALAZINE (APRESOLINE) injection 10 mg  10 mg IntraVENous Q6H PRN Gregorio Hernandez APRN - CNP   10 mg

## 2024-09-13 NOTE — BH NOTE
Patient ambulated to bathroom with assist and urinated without difficulty.   Psychiatry Initial Consultation    Patient Name: Javier De La Torre  MRN: [id number]  Admission Date: 9/8/2024    Reason for Consult: Need evaluation if patient has capacity to make medical decisions on their own behalf    HPI:   Javier De La Torre is a 89 y.o. male who presented to the hospital on 09/08/2024 with a chief complaint of issues with ADLs.    Discussed with Dr. Menendez prior to assessment. States that Javier has been refusing placement and seems to struggle with discussion of treatment plan/goals of care.     Attempted to complete an Aid to Capacity Evaluation but unable to complete it in its entirety as Javier kept saying \"I talked to someone else yesterday who asked me all these questions. You should talk to her\". He acknowledges that he was recently at a skilled nursing facility for rehab and that he graduated and was sent home. He does not want to go back to one at this time as he had to share a bathroom at the last one, which caused him to have accidents if the other person was in it when he needed it. When asked if there were any risks versus benefits of placement, he declined to answer but it is unknown if this is due to lack of understanding or him just not wanting to discuss the topic. He did identify that should he go home, he could fall but \"I won't because I have a walker and I don't walk like I used to. If I fell, I have neighbors that help me out and I live close to the fire station\". He was able to state that if he fell, \"it could be a bad fall and I could hit my head\". Declined to discuss it further; unknown if this is due to lack of insight into the possible severity of a fall or if he just did not want to discuss it with me. States \"I talk loud and harsh like I'm mad but I'm not mad. I know you're doing your job\".    Discussed results of assessment with Dr. Menendez.    Duration: Ongoing   Severity: Moderate-Severe  Context: Stress, medical issues  Associated Symptoms: As above    Past Psychiatric

## 2024-09-14 PROBLEM — N28.9 RENAL INSUFFICIENCY: Status: ACTIVE | Noted: 2024-09-14

## 2024-09-14 PROBLEM — I25.10 ASHD (ARTERIOSCLEROTIC HEART DISEASE): Status: ACTIVE | Noted: 2024-09-14

## 2024-09-14 PROBLEM — I10 PRIMARY HYPERTENSION: Status: ACTIVE | Noted: 2024-09-14

## 2024-09-14 PROBLEM — I50.32 CHRONIC HEART FAILURE WITH PRESERVED EJECTION FRACTION (HCC): Status: ACTIVE | Noted: 2024-09-14

## 2024-09-14 PROBLEM — I48.91 ATRIAL FIBRILLATION (HCC): Status: ACTIVE | Noted: 2024-09-12

## 2024-09-14 LAB
ALBUMIN SERPL-MCNC: 3.1 G/DL (ref 3.4–5)
ANION GAP SERPL CALCULATED.3IONS-SCNC: 9 MMOL/L (ref 3–16)
BUN SERPL-MCNC: 33 MG/DL (ref 7–20)
CALCIUM SERPL-MCNC: 8.6 MG/DL (ref 8.3–10.6)
CHLORIDE SERPL-SCNC: 106 MMOL/L (ref 99–110)
CO2 SERPL-SCNC: 23 MMOL/L (ref 21–32)
CREAT SERPL-MCNC: 1.3 MG/DL (ref 0.8–1.3)
DEPRECATED RDW RBC AUTO: 16.8 % (ref 12.4–15.4)
GFR SERPLBLD CREATININE-BSD FMLA CKD-EPI: 51 ML/MIN/{1.73_M2}
GLUCOSE SERPL-MCNC: 110 MG/DL (ref 70–99)
HCT VFR BLD AUTO: 32.3 % (ref 40.5–52.5)
HGB BLD-MCNC: 10.6 G/DL (ref 13.5–17.5)
MCH RBC QN AUTO: 32.7 PG (ref 26–34)
MCHC RBC AUTO-ENTMCNC: 32.7 G/DL (ref 31–36)
MCV RBC AUTO: 100.1 FL (ref 80–100)
PHOSPHATE SERPL-MCNC: 2.5 MG/DL (ref 2.5–4.9)
PLATELET # BLD AUTO: 137 K/UL (ref 135–450)
PMV BLD AUTO: 9.3 FL (ref 5–10.5)
POTASSIUM SERPL-SCNC: 3.7 MMOL/L (ref 3.5–5.1)
RBC # BLD AUTO: 3.23 M/UL (ref 4.2–5.9)
SODIUM SERPL-SCNC: 138 MMOL/L (ref 136–145)
WBC # BLD AUTO: 5.9 K/UL (ref 4–11)

## 2024-09-14 PROCEDURE — 6370000000 HC RX 637 (ALT 250 FOR IP): Performed by: NURSE PRACTITIONER

## 2024-09-14 PROCEDURE — 6370000000 HC RX 637 (ALT 250 FOR IP): Performed by: INTERNAL MEDICINE

## 2024-09-14 PROCEDURE — 99232 SBSQ HOSP IP/OBS MODERATE 35: CPT | Performed by: NURSE PRACTITIONER

## 2024-09-14 PROCEDURE — 85027 COMPLETE CBC AUTOMATED: CPT

## 2024-09-14 PROCEDURE — 36415 COLL VENOUS BLD VENIPUNCTURE: CPT

## 2024-09-14 PROCEDURE — 6370000000 HC RX 637 (ALT 250 FOR IP)

## 2024-09-14 PROCEDURE — 2060000000 HC ICU INTERMEDIATE R&B

## 2024-09-14 PROCEDURE — 2580000003 HC RX 258: Performed by: INTERNAL MEDICINE

## 2024-09-14 PROCEDURE — 6360000002 HC RX W HCPCS: Performed by: INTERNAL MEDICINE

## 2024-09-14 PROCEDURE — 80069 RENAL FUNCTION PANEL: CPT

## 2024-09-14 RX ORDER — HYDRALAZINE HYDROCHLORIDE 25 MG/1
25 TABLET, FILM COATED ORAL EVERY 8 HOURS SCHEDULED
Status: DISCONTINUED | OUTPATIENT
Start: 2024-09-14 | End: 2024-09-15

## 2024-09-14 RX ADMIN — SODIUM CHLORIDE, PRESERVATIVE FREE 10 ML: 5 INJECTION INTRAVENOUS at 20:43

## 2024-09-14 RX ADMIN — HYDRALAZINE HYDROCHLORIDE 25 MG: 25 TABLET ORAL at 09:12

## 2024-09-14 RX ADMIN — LEVOTHYROXINE SODIUM 25 MCG: 0.03 TABLET ORAL at 05:14

## 2024-09-14 RX ADMIN — ATORVASTATIN CALCIUM 80 MG: 80 TABLET, FILM COATED ORAL at 09:08

## 2024-09-14 RX ADMIN — LISINOPRIL 20 MG: 20 TABLET ORAL at 09:08

## 2024-09-14 RX ADMIN — ENOXAPARIN SODIUM 40 MG: 100 INJECTION SUBCUTANEOUS at 09:08

## 2024-09-14 RX ADMIN — ASPIRIN 81 MG: 81 TABLET, COATED ORAL at 09:08

## 2024-09-14 RX ADMIN — HYDRALAZINE HYDROCHLORIDE 25 MG: 25 TABLET ORAL at 20:41

## 2024-09-14 RX ADMIN — HYDRALAZINE HYDROCHLORIDE 25 MG: 25 TABLET ORAL at 17:34

## 2024-09-14 RX ADMIN — ACETAMINOPHEN 650 MG: 325 TABLET ORAL at 20:41

## 2024-09-14 ASSESSMENT — PAIN DESCRIPTION - LOCATION: LOCATION: HEAD

## 2024-09-14 ASSESSMENT — PAIN SCALES - GENERAL: PAINLEVEL_OUTOF10: 3

## 2024-09-14 NOTE — PROGRESS NOTES
[facility] Progress Note           Subjective/   89 y.o. year old male who we are seeing in consultation for HTN, CKD stage 3b.     HPI:  Bp's remain on higher end.  Renal function stable, non-oliguric with ferrell.    ROS:  +lightheaded - states about the same since admission, intake adequate.    Objective/   GEN:  Chronically ill, BP (!) 166/68   Pulse 76   Temp 98.3 °F (36.8 °C) (Oral)   Resp 16   Ht 1.829 m (6')   Wt 69.7 kg (153 lb 10.6 oz)   SpO2 96%   BMI 20.84 kg/m²   HEENT: non-icteric, no JVD  CV: S1, S2 without m/r/g; no LE edema  RESP: CTA B without w/r/r; breathing wnl  ABD: +bs, soft, nt, no hsm  SKIN: warm, no rashes    Data/  Recent Labs     09/14/24  0527   WBC 5.9   HGB 10.6*   HCT 32.3*   .1*        Recent Labs     09/12/24  0454 09/13/24  0456 09/14/24  0527    139 138   K 3.8 3.8 3.7    106 106   CO2 23 23 23   GLUCOSE 108* 105* 110*   PHOS 2.7 2.6 2.5   BUN 42* 37* 33*   CREATININE 1.6* 1.4* 1.3   LABGLOM 39* 46* 51*       Assessment/     - Hypertension - labile     - Chronic kidney disease stage 3b - background HTN +/- Urinary retention                Baseline SCr variable 1.2-1.5 range at its best, as high as ~2 range in past     - Urinary retention - requiring ferrell PTA    Plan/     - BP control with resuming of lisinopril, hydralazine, prn clonidine, off metoprolol per Cardiology  - Ferrell mgmt  - Trend labs, bp's, weights, & urine output     ____________________________________  Abdirashid Toledo MD  [facility]  [email or web address]  Office: [phone]

## 2024-09-14 NOTE — PROGRESS NOTES
[facility]   Heart Failure Daily Progress Note    Admit Date:  9/8/2024  HPI:    Chief Complaint   Patient presents with    Issues with ADLs     Pt reports that he has trouble walking, emptying his bag and he is afraid he will fall and not be able to get up.         Javier De La Torre is being followed for afib.   admitted on 9/8/2024 with failure to thrive and difficulties caring for himself.     Interval history: completed CTA cardiac yesterday- results pending    Subjective:  Mr. De La Torre denies any chest pain or shortness of breath.     Objective:   BP (!) 161/73   Pulse 74   Temp 97.9 °F (36.6 °C) (Oral)   Resp 16   Ht 1.829 m (6')   Wt 69.7 kg (153 lb 10.6 oz)   SpO2 94%   BMI 20.84 kg/m²     Intake/Output Summary (Last 24 hours) at 9/14/2024 0756  Last data filed at 9/14/2024 0514  Gross per 24 hour   Intake 360 ml   Output 850 ml   Net -490 ml       NYHA: II    Physical Exam:  General:  Awake, alert, NAD, talkative   Skin:  Warm and dry  Neck:  JVD<8  Chest:  Clear to auscultation, no wheezes/rhonchi/rales  Cardiovascular:  reg  S1S2, no m/r/g   Abdomen:  Soft, nontender, +bowel sounds  Extremities:  Trace bilateral lower extremity edema    Medications:    enoxaparin  40 mg SubCUTAneous Daily    levothyroxine  25 mcg Oral Daily    lisinopril  20 mg Oral Daily    mupirocin   Each Nostril BID    aspirin  81 mg Oral Daily    atorvastatin  80 mg Oral Daily    [Held by provider] tamsulosin  0.4 mg Oral Daily    sodium chloride flush  5-40 mL IntraVENous 2 times per day      sodium chloride         Lab Data:  CBC: No results for input(s): \"WBC\", \"HGB\", \"PLT\" in the last 72 hours.  BMP:    Recent Labs     09/12/24  0454 09/13/24  0456 09/14/24  0527    139 138   K 3.8 3.8 3.7   CO2 23 23 23   BUN 42* 37* 33*   CREATININE 1.6* 1.4* 1.3     INR:  No results for input(s): \"INR\" in the last 72 hours.  BNP:  No results for input(s): \"PROBNP\" in the last 72 hours.  No results found for: \"LVEF\",

## 2024-09-14 NOTE — PROGRESS NOTES
difficult to control blood pressures, appreciate their input      Subclinical hypothyroidism  -TSH/T4 = 12/1.2  - 25 MCG levothyroxine    Acute urinary retention   -Patient urine output within normal limits during this admission  -Net - 490 mL in the last 24 hours (850 ml urine)  -Voiding trial passed, Winslow removed, patient urinating    Hyperlipidemia   -Continue atorvastatin 80      Diet ADULT DIET; Regular   DVT Prophylaxis [x] Lovenox, []  Heparin, [] SCDs, [] Ambulation,  [] Eliquis, [] Xarelto  [] Coumadin   Code Status Full Code   Disposition From: Home  Expected Disposition: Home  Estimated Date of Discharge: 9/9/2024   patient requires continued admission due to    Surrogate Decision Maker/ Mariel Keith -daughter         Subjective:     Chief Complaint:     Javier De La Torre is a 89 y.o. male who presents with hypotension    HPI:    89 [facility]. [facility] difficulty managing Winslow catheter, hypotension and possible AURELIO.  PMHx significant for hypertension, benign prostate hypertrophy, hyperlipidemia.  Patient lives by himself at home and has been managing well till couple of days ago when he presented to the emergency room for urinary retention required Winslow catheter placement patient was sent back home with a plan to follow-up with PCP as an outpatient and also call urology.  At that time his creatinine was mildly elevated 1.7 but the last creatinine was from 2019 which was 1.3.  Patient went home was not able to manage well with his Winslow catheter and had difficulty emptying the catheter and almost had a fall at home so decided to come to the emergency room for evaluation since he lives by himself.  Patient denied having any fever, chills, chest pain no nausea no vomiting, his creatinine was mildly elevated up to 2 from 1.7 on 9/6, However his blood pressure has been on the lower side at 91/51, also had episode of bradycardia 53, patient said he has been eating and  Comments: Poor insight               Medications:   Medications:    hydrALAZINE  25 mg Oral 3 times per day    enoxaparin  40 mg SubCUTAneous Daily    levothyroxine  25 mcg Oral Daily    lisinopril  20 mg Oral Daily    aspirin  81 mg Oral Daily    atorvastatin  80 mg Oral Daily    [Held by provider] tamsulosin  0.4 mg Oral Daily    sodium chloride flush  5-40 mL IntraVENous 2 times per day      Infusions:    sodium chloride       PRN Meds: perflutren lipid microspheres, 1.5 mL, ONCE PRN  hydrALAZINE, 10 mg, Q6H PRN  cloNIDine, 0.1 mg, Q4H PRN  sodium chloride flush, 5-40 mL, PRN  sodium chloride, , PRN  ondansetron, 4 mg, Q8H PRN   Or  ondansetron, 4 mg, Q6H PRN  polyethylene glycol, 17 g, Daily PRN  acetaminophen, 650 mg, Q6H PRN   Or  acetaminophen, 650 mg, Q6H PRN        Labs and Imaging   No results found.    CBC:   Recent Labs     09/14/24  0527   WBC 5.9   HGB 10.6*          BMP:    Recent Labs     09/12/24  0454 09/13/24  0456 09/14/24  0527    139 138   K 3.8 3.8 3.7    106 106   CO2 23 23 23   BUN 42* 37* 33*   CREATININE 1.6* 1.4* 1.3   GLUCOSE 108* 105* 110*     Hepatic:   No results for input(s): \"AST\", \"ALT\", \"BILITOT\", \"ALKPHOS\" in the last 72 hours.    Invalid input(s): \"ALB\"      UA:  Lab Results   Component Value Date/Time    NITRU Negative 09/08/2024 03:07 AM    COLORU Yellow 09/08/2024 03:07 AM    PHUR 6.0 09/08/2024 03:07 AM    PHUR 6.5 05/07/2020 04:50 AM    WBCUA 0-2 09/08/2024 03:07 AM    RBCUA 5-10 09/08/2024 03:07 AM    BACTERIA Rare 09/07/2024 12:00 AM    CLARITYU Clear 09/08/2024 03:07 AM    LEUKOCYTESUR TRACE 09/08/2024 03:07 AM    UROBILINOGEN 0.2 09/08/2024 03:07 AM    BILIRUBINUR Negative 09/08/2024 03:07 AM    BLOODU LARGE 09/08/2024 03:07 AM    GLUCOSEU Negative 09/08/2024 03:07 AM    KETUA Negative 09/08/2024 03:07 AM           Deepak Lei MD   PGY-1   [facility]

## 2024-09-15 LAB
ALBUMIN SERPL-MCNC: 3.1 G/DL (ref 3.4–5)
ALBUMIN/GLOB SERPL: 1.2 {RATIO} (ref 1.1–2.2)
ALP SERPL-CCNC: 94 U/L (ref 40–129)
ALT SERPL-CCNC: 21 U/L (ref 10–40)
ANION GAP SERPL CALCULATED.3IONS-SCNC: 9 MMOL/L (ref 3–16)
AST SERPL-CCNC: 26 U/L (ref 15–37)
BASOPHILS # BLD: 0 K/UL (ref 0–0.2)
BASOPHILS NFR BLD: 0.4 %
BILIRUB SERPL-MCNC: 0.9 MG/DL (ref 0–1)
BUN SERPL-MCNC: 28 MG/DL (ref 7–20)
CALCIUM SERPL-MCNC: 8.6 MG/DL (ref 8.3–10.6)
CHLORIDE SERPL-SCNC: 106 MMOL/L (ref 99–110)
CO2 SERPL-SCNC: 23 MMOL/L (ref 21–32)
CREAT SERPL-MCNC: 1.3 MG/DL (ref 0.8–1.3)
DEPRECATED RDW RBC AUTO: 15.9 % (ref 12.4–15.4)
EOSINOPHIL # BLD: 0.2 K/UL (ref 0–0.6)
EOSINOPHIL NFR BLD: 4 %
GFR SERPLBLD CREATININE-BSD FMLA CKD-EPI: 51 ML/MIN/{1.73_M2}
GLUCOSE SERPL-MCNC: 97 MG/DL (ref 70–99)
HCT VFR BLD AUTO: 30.6 % (ref 40.5–52.5)
HGB BLD-MCNC: 10.6 G/DL (ref 13.5–17.5)
LYMPHOCYTES # BLD: 1.3 K/UL (ref 1–5.1)
LYMPHOCYTES NFR BLD: 22.9 %
MAGNESIUM SERPL-MCNC: 1.7 MG/DL (ref 1.8–2.4)
MCH RBC QN AUTO: 34.3 PG (ref 26–34)
MCHC RBC AUTO-ENTMCNC: 34.5 G/DL (ref 31–36)
MCV RBC AUTO: 99.3 FL (ref 80–100)
MONOCYTES # BLD: 0.7 K/UL (ref 0–1.3)
MONOCYTES NFR BLD: 11.8 %
NEUTROPHILS # BLD: 3.4 K/UL (ref 1.7–7.7)
NEUTROPHILS NFR BLD: 60.9 %
PLATELET # BLD AUTO: 130 K/UL (ref 135–450)
PMV BLD AUTO: 9 FL (ref 5–10.5)
POTASSIUM SERPL-SCNC: 3.3 MMOL/L (ref 3.5–5.1)
PROT SERPL-MCNC: 5.6 G/DL (ref 6.4–8.2)
RBC # BLD AUTO: 3.08 M/UL (ref 4.2–5.9)
SODIUM SERPL-SCNC: 138 MMOL/L (ref 136–145)
WBC # BLD AUTO: 5.5 K/UL (ref 4–11)

## 2024-09-15 PROCEDURE — 36415 COLL VENOUS BLD VENIPUNCTURE: CPT

## 2024-09-15 PROCEDURE — 99232 SBSQ HOSP IP/OBS MODERATE 35: CPT | Performed by: NURSE PRACTITIONER

## 2024-09-15 PROCEDURE — 6360000002 HC RX W HCPCS

## 2024-09-15 PROCEDURE — 6370000000 HC RX 637 (ALT 250 FOR IP): Performed by: NURSE PRACTITIONER

## 2024-09-15 PROCEDURE — 80053 COMPREHEN METABOLIC PANEL: CPT

## 2024-09-15 PROCEDURE — 6370000000 HC RX 637 (ALT 250 FOR IP): Performed by: INTERNAL MEDICINE

## 2024-09-15 PROCEDURE — 83735 ASSAY OF MAGNESIUM: CPT

## 2024-09-15 PROCEDURE — 2580000003 HC RX 258: Performed by: INTERNAL MEDICINE

## 2024-09-15 PROCEDURE — 6360000002 HC RX W HCPCS: Performed by: INTERNAL MEDICINE

## 2024-09-15 PROCEDURE — 2060000000 HC ICU INTERMEDIATE R&B

## 2024-09-15 PROCEDURE — 6370000000 HC RX 637 (ALT 250 FOR IP)

## 2024-09-15 PROCEDURE — 85025 COMPLETE CBC W/AUTO DIFF WBC: CPT

## 2024-09-15 RX ORDER — HYDRALAZINE HYDROCHLORIDE 50 MG/1
50 TABLET, FILM COATED ORAL EVERY 8 HOURS SCHEDULED
Status: DISCONTINUED | OUTPATIENT
Start: 2024-09-15 | End: 2024-09-16

## 2024-09-15 RX ORDER — MAGNESIUM SULFATE IN WATER 40 MG/ML
2000 INJECTION, SOLUTION INTRAVENOUS ONCE
Status: COMPLETED | OUTPATIENT
Start: 2024-09-15 | End: 2024-09-15

## 2024-09-15 RX ORDER — POTASSIUM CHLORIDE 1500 MG/1
40 TABLET, EXTENDED RELEASE ORAL ONCE
Status: COMPLETED | OUTPATIENT
Start: 2024-09-15 | End: 2024-09-15

## 2024-09-15 RX ADMIN — ASPIRIN 81 MG: 81 TABLET, COATED ORAL at 09:10

## 2024-09-15 RX ADMIN — ENOXAPARIN SODIUM 40 MG: 100 INJECTION SUBCUTANEOUS at 09:10

## 2024-09-15 RX ADMIN — LISINOPRIL 20 MG: 20 TABLET ORAL at 09:10

## 2024-09-15 RX ADMIN — LEVOTHYROXINE SODIUM 25 MCG: 0.03 TABLET ORAL at 06:15

## 2024-09-15 RX ADMIN — MAGNESIUM SULFATE HEPTAHYDRATE 2000 MG: 40 INJECTION, SOLUTION INTRAVENOUS at 09:21

## 2024-09-15 RX ADMIN — HYDRALAZINE HYDROCHLORIDE 25 MG: 25 TABLET ORAL at 06:15

## 2024-09-15 RX ADMIN — CLONIDINE HYDROCHLORIDE 0.1 MG: 0.1 TABLET ORAL at 12:07

## 2024-09-15 RX ADMIN — HYDRALAZINE HYDROCHLORIDE 50 MG: 50 TABLET ORAL at 14:35

## 2024-09-15 RX ADMIN — POTASSIUM CHLORIDE 40 MEQ: 1500 TABLET, EXTENDED RELEASE ORAL at 09:10

## 2024-09-15 RX ADMIN — HYDRALAZINE HYDROCHLORIDE 50 MG: 50 TABLET ORAL at 20:13

## 2024-09-15 RX ADMIN — SODIUM CHLORIDE, PRESERVATIVE FREE 10 ML: 5 INJECTION INTRAVENOUS at 09:10

## 2024-09-15 RX ADMIN — ATORVASTATIN CALCIUM 80 MG: 80 TABLET, FILM COATED ORAL at 09:10

## 2024-09-15 NOTE — PROGRESS NOTES
[facility]   Heart Failure Daily Progress Note    Admit Date:  9/8/2024  HPI:    Chief Complaint   Patient presents with    Issues with ADLs     Pt reports that he has trouble walking, emptying his bag and he is afraid he will fall and not be able to get up.         Javier De La Torre is being followed for afib.   admitted on 9/8/2024 with failure to thrive and difficulties caring for himself.     Interval history: completed CTA cardiac 9/13/2024- results pending    Subjective:  Mr. De La Torre denies any chest pain or shortness of breath.  Sitting up in the chair.    Objective:   BP (!) 162/73   Pulse 74   Temp 97.8 °F (36.6 °C) (Oral)   Resp 18   Ht 1.829 m (6')   Wt 70.9 kg (156 lb 4.9 oz)   SpO2 95%   BMI 21.20 kg/m²     Intake/Output Summary (Last 24 hours) at 9/15/2024 0713  Last data filed at 9/15/2024 0409  Gross per 24 hour   Intake 720 ml   Output 1950 ml   Net -1230 ml       NYHA: II    Physical Exam:  General:  Awake, alert, NAD, talkative   Skin:  Warm and dry  Neck:  JVD<8  Chest:  Clear to auscultation, no wheezes/rhonchi/rales  Cardiovascular:  reg  S1S2, no m/r/g   Abdomen:  Soft, nontender, +bowel sounds  Extremities:  Trace bilateral lower extremity edema    Medications:    hydrALAZINE  50 mg Oral 3 times per day    enoxaparin  40 mg SubCUTAneous Daily    levothyroxine  25 mcg Oral Daily    lisinopril  20 mg Oral Daily    aspirin  81 mg Oral Daily    atorvastatin  80 mg Oral Daily    [Held by provider] tamsulosin  0.4 mg Oral Daily    sodium chloride flush  5-40 mL IntraVENous 2 times per day      sodium chloride         Lab Data:  CBC:   Recent Labs     09/14/24  0527 09/15/24  0458   WBC 5.9 5.5   HGB 10.6* 10.6*    130*     BMP:    Recent Labs     09/13/24  0456 09/14/24  0527 09/15/24  0458    138 138   K 3.8 3.7 3.3*   CO2 23 23 23   BUN 37* 33* 28*   CREATININE 1.4* 1.3 1.3     INR:  No results for input(s): \"INR\" in the last 72 hours.  BNP:  No results for

## 2024-09-15 NOTE — PLAN OF CARE
Problem: Safety - Adult  Goal: Free from fall injury  Outcome: Progressing     Problem: Discharge Planning  Goal: Discharge to home or other facility with appropriate resources  Outcome: Progressing     Problem: Skin/Tissue Integrity  Goal: Absence of new skin breakdown  Description: 1.  Monitor for areas of redness and/or skin breakdown  2.  Assess vascular access sites hourly  3.  Every 4-6 hours minimum:  Change oxygen saturation probe site  4.  Every 4-6 hours:  If on nasal continuous positive airway pressure, respiratory therapy assess nares and determine need for appliance change or resting period.  Outcome: Progressing     Problem: ABCDS Injury Assessment  Goal: Absence of physical injury  Outcome: Progressing     Problem: Pain  Goal: Verbalizes/displays adequate comfort level or baseline comfort level  Outcome: Progressing      Patient's daughter informed of lab results and message from Dr. Tyson.

## 2024-09-15 NOTE — PROGRESS NOTES
V2.0    [facility] [place] [facility] Progress Note      Name:  Javier De La Torre /Age/Sex: 1929  (89 y.o. male)   MRN & CSN:  [id number] & [id number] Encounter Date/Time: 9/15/2024 8:18 AM EDT   Location:  [facility]01 PCP: Kyela Juan     Attending:Rossy Messina MD       Hospital Day: 8    Assessment and Recommendations   Javier De La Torre is a 89 y.o. male with pmh of hypertension, benign prostate hypertrophy, hyperlipidemia who presents with Hypotension      Interval Events:   Patient observed sitting upright in no acute distress. CTA pending.  No new complaints this morning.  Denies headache, dizziness, chest pain, shortness of breath.  Hypokalemia and hypomagnesia noted this morning  potassium repleted with 40 meq and magnesium repleted with 2 g IV infusion. No other complaints or concerns.    Plan:   Hypertension   -Nephrology consulted for difficult to manage blood pressures  -Continue lisinopril, hydralazine, clonidine as needed  -Holding metoprolol  -Flomax discontinued    Afib w SVR  -Consulted by cardiology for new onset A-fib with slow ventricular response/atrial flutter  - [id number] = 4  -EP following, pending results of cardiac CTA for evaluation of left atrial appendage    ECHO   :   Left Ventricle: EF of 55 - 60%. Left ventricle is smaller than normal.  Mildly increased wall thickness. Findings consistent with concentric hypertrophy. Normal wall motion. Grade III diastolic dysfunction with increased LAP.     Right Ventricle: Severe RV dilation and pulmonary HTN  -Continue atorvastatin  -Continue aspirin 81 mg    Capacity assessment  -Patient does not seem to fully acknowledge the severity of his conditions  (denying the need for therapy postdischarge' insists that there is \"nothing wrong with him\")  -Palliative care following  -Psychiatry consulted, pending reevaluation Monday      CKD-3B  -Creatinine now at 1.3  -Will follow-up renal function panel  -Unclear baseline  -Defer to nephrology for  hydrALAZINE  50 mg Oral 3 times per day    enoxaparin  40 mg SubCUTAneous Daily    levothyroxine  25 mcg Oral Daily    lisinopril  20 mg Oral Daily    aspirin  81 mg Oral Daily    atorvastatin  80 mg Oral Daily    [Held by provider] tamsulosin  0.4 mg Oral Daily    sodium chloride flush  5-40 mL IntraVENous 2 times per day      Infusions:    sodium chloride       PRN Meds: perflutren lipid microspheres, 1.5 mL, ONCE PRN  hydrALAZINE, 10 mg, Q6H PRN  cloNIDine, 0.1 mg, Q4H PRN  sodium chloride flush, 5-40 mL, PRN  sodium chloride, , PRN  ondansetron, 4 mg, Q8H PRN   Or  ondansetron, 4 mg, Q6H PRN  polyethylene glycol, 17 g, Daily PRN  acetaminophen, 650 mg, Q6H PRN   Or  acetaminophen, 650 mg, Q6H PRN        Labs and Imaging   No results found.    CBC:   Recent Labs     09/14/24  0527 09/15/24  0458   WBC 5.9 5.5   HGB 10.6* 10.6*    130*       BMP:    Recent Labs     09/13/24  0456 09/14/24  0527 09/15/24  0458    138 138   K 3.8 3.7 3.3*    106 106   CO2 23 23 23   BUN 37* 33* 28*   CREATININE 1.4* 1.3 1.3   GLUCOSE 105* 110* 97     Hepatic:   Recent Labs     09/15/24  0458   AST 26   ALT 21   BILITOT 0.9   ALKPHOS 94         UA:  Lab Results   Component Value Date/Time    NITRU Negative 09/08/2024 03:07 AM    COLORU Yellow 09/08/2024 03:07 AM    PHUR 6.0 09/08/2024 03:07 AM    PHUR 6.5 05/07/2020 04:50 AM    WBCUA 0-2 09/08/2024 03:07 AM    RBCUA 5-10 09/08/2024 03:07 AM    BACTERIA Rare 09/07/2024 12:00 AM    CLARITYU Clear 09/08/2024 03:07 AM    LEUKOCYTESUR TRACE 09/08/2024 03:07 AM    UROBILINOGEN 0.2 09/08/2024 03:07 AM    BILIRUBINUR Negative 09/08/2024 03:07 AM    BLOODU LARGE 09/08/2024 03:07 AM    GLUCOSEU Negative 09/08/2024 03:07 AM    KETUA Negative 09/08/2024 03:07 AM           Deepak Lei MD   PGY-1   [facility]

## 2024-09-15 NOTE — PROGRESS NOTES
[facility] Progress Note           Subjective/   89 y.o. year old male who we are seeing in consultation for HTN, CKD stage 3b.     HPI:  Bp's remain on higher end.  Renal function stable, non-oliguric with ferrell.    ROS:  +lightheaded - states about the same since admission, intake adequate.    Objective/   GEN:  Chronically ill, BP (!) 181/83   Pulse 77   Temp 97.8 °F (36.6 °C) (Oral)   Resp 16   Ht 1.829 m (6')   Wt 70.9 kg (156 lb 4.9 oz)   SpO2 95%   BMI 21.20 kg/m²   HEENT: non-icteric, no JVD  CV: S1, S2 without m/r/g; no LE edema  RESP: CTA B without w/r/r; breathing wnl  ABD: +bs, soft, nt, no hsm  SKIN: warm, no rashes    Data/  Recent Labs     09/14/24  0527 09/15/24  0458   WBC 5.9 5.5   HGB 10.6* 10.6*   HCT 32.3* 30.6*   .1* 99.3    130*     Recent Labs     09/13/24  0456 09/14/24  0527 09/15/24  0458    138 138   K 3.8 3.7 3.3*    106 106   CO2 23 23 23   GLUCOSE 105* 110* 97   PHOS 2.6 2.5  --    MG  --   --  1.70*   BUN 37* 33* 28*   CREATININE 1.4* 1.3 1.3   LABGLOM 46* 51* 51*       Assessment/     - Hypertension - labile     - Chronic kidney disease stage 3b - background HTN +/- Urinary retention                Baseline SCr variable 1.2-1.5 range at its best, as high as ~2 range in past     - Urinary retention - requiring ferrell PTA    Plan/     - BP control with resuming of lisinopril, hydralazine, prn clonidine, off metoprolol, plans per Cardiology  - Ferrell mgmt  - Trend labs, bp's, weights, & urine output    No further recommendations at this time  Will sign off for now  Call with questions     ____________________________________  Abdirashid Toledo MD  [facility]  [email or web address]  Office: [phone]

## 2024-09-16 LAB
ALBUMIN SERPL-MCNC: 3.4 G/DL (ref 3.4–5)
ALBUMIN/GLOB SERPL: 1.3 {RATIO} (ref 1.1–2.2)
ALP SERPL-CCNC: 102 U/L (ref 40–129)
ALT SERPL-CCNC: 22 U/L (ref 10–40)
ANION GAP SERPL CALCULATED.3IONS-SCNC: 9 MMOL/L (ref 3–16)
AST SERPL-CCNC: 26 U/L (ref 15–37)
BASOPHILS # BLD: 0.1 K/UL (ref 0–0.2)
BASOPHILS NFR BLD: 0.7 %
BILIRUB SERPL-MCNC: 0.8 MG/DL (ref 0–1)
BUN SERPL-MCNC: 27 MG/DL (ref 7–20)
CALCIUM SERPL-MCNC: 8.9 MG/DL (ref 8.3–10.6)
CHLORIDE SERPL-SCNC: 107 MMOL/L (ref 99–110)
CO2 SERPL-SCNC: 23 MMOL/L (ref 21–32)
CREAT SERPL-MCNC: 1.5 MG/DL (ref 0.8–1.3)
DEPRECATED RDW RBC AUTO: 16.3 % (ref 12.4–15.4)
EOSINOPHIL # BLD: 0.2 K/UL (ref 0–0.6)
EOSINOPHIL NFR BLD: 3 %
GFR SERPLBLD CREATININE-BSD FMLA CKD-EPI: 43 ML/MIN/{1.73_M2}
GLUCOSE SERPL-MCNC: 93 MG/DL (ref 70–99)
HCT VFR BLD AUTO: 31.9 % (ref 40.5–52.5)
HGB BLD-MCNC: 10.9 G/DL (ref 13.5–17.5)
LYMPHOCYTES # BLD: 1.5 K/UL (ref 1–5.1)
LYMPHOCYTES NFR BLD: 21.4 %
MAGNESIUM SERPL-MCNC: 1.9 MG/DL (ref 1.8–2.4)
MCH RBC QN AUTO: 33.8 PG (ref 26–34)
MCHC RBC AUTO-ENTMCNC: 34.2 G/DL (ref 31–36)
MCV RBC AUTO: 98.9 FL (ref 80–100)
MONOCYTES # BLD: 0.7 K/UL (ref 0–1.3)
MONOCYTES NFR BLD: 10.3 %
NEUTROPHILS # BLD: 4.5 K/UL (ref 1.7–7.7)
NEUTROPHILS NFR BLD: 64.6 %
PLATELET # BLD AUTO: 159 K/UL (ref 135–450)
PMV BLD AUTO: 8.7 FL (ref 5–10.5)
POTASSIUM SERPL-SCNC: 3.9 MMOL/L (ref 3.5–5.1)
PROT SERPL-MCNC: 6.1 G/DL (ref 6.4–8.2)
RBC # BLD AUTO: 3.23 M/UL (ref 4.2–5.9)
SODIUM SERPL-SCNC: 139 MMOL/L (ref 136–145)
WBC # BLD AUTO: 6.9 K/UL (ref 4–11)

## 2024-09-16 PROCEDURE — 6370000000 HC RX 637 (ALT 250 FOR IP): Performed by: INTERNAL MEDICINE

## 2024-09-16 PROCEDURE — 6360000002 HC RX W HCPCS: Performed by: NURSE PRACTITIONER

## 2024-09-16 PROCEDURE — 6360000002 HC RX W HCPCS: Performed by: INTERNAL MEDICINE

## 2024-09-16 PROCEDURE — 80053 COMPREHEN METABOLIC PANEL: CPT

## 2024-09-16 PROCEDURE — 97530 THERAPEUTIC ACTIVITIES: CPT

## 2024-09-16 PROCEDURE — 6370000000 HC RX 637 (ALT 250 FOR IP): Performed by: NURSE PRACTITIONER

## 2024-09-16 PROCEDURE — 99232 SBSQ HOSP IP/OBS MODERATE 35: CPT

## 2024-09-16 PROCEDURE — 75574 CT ANGIO HRT W/3D IMAGE: CPT | Performed by: STUDENT IN AN ORGANIZED HEALTH CARE EDUCATION/TRAINING PROGRAM

## 2024-09-16 PROCEDURE — 2580000003 HC RX 258: Performed by: INTERNAL MEDICINE

## 2024-09-16 PROCEDURE — 83735 ASSAY OF MAGNESIUM: CPT

## 2024-09-16 PROCEDURE — 6370000000 HC RX 637 (ALT 250 FOR IP)

## 2024-09-16 PROCEDURE — 97116 GAIT TRAINING THERAPY: CPT

## 2024-09-16 PROCEDURE — 36415 COLL VENOUS BLD VENIPUNCTURE: CPT

## 2024-09-16 PROCEDURE — 2060000000 HC ICU INTERMEDIATE R&B

## 2024-09-16 PROCEDURE — 97110 THERAPEUTIC EXERCISES: CPT

## 2024-09-16 PROCEDURE — 85025 COMPLETE CBC W/AUTO DIFF WBC: CPT

## 2024-09-16 RX ORDER — HYDRALAZINE HYDROCHLORIDE 50 MG/1
100 TABLET, FILM COATED ORAL EVERY 8 HOURS SCHEDULED
Status: DISCONTINUED | OUTPATIENT
Start: 2024-09-16 | End: 2024-09-17 | Stop reason: HOSPADM

## 2024-09-16 RX ADMIN — ASPIRIN 81 MG: 81 TABLET, COATED ORAL at 08:43

## 2024-09-16 RX ADMIN — LISINOPRIL 20 MG: 20 TABLET ORAL at 08:43

## 2024-09-16 RX ADMIN — HYDRALAZINE HYDROCHLORIDE 100 MG: 50 TABLET ORAL at 20:59

## 2024-09-16 RX ADMIN — ATORVASTATIN CALCIUM 80 MG: 80 TABLET, FILM COATED ORAL at 08:43

## 2024-09-16 RX ADMIN — HYDRALAZINE HYDROCHLORIDE 50 MG: 50 TABLET ORAL at 05:09

## 2024-09-16 RX ADMIN — CLONIDINE HYDROCHLORIDE 0.1 MG: 0.1 TABLET ORAL at 15:18

## 2024-09-16 RX ADMIN — HYDRALAZINE HYDROCHLORIDE 10 MG: 20 INJECTION INTRAMUSCULAR; INTRAVENOUS at 23:38

## 2024-09-16 RX ADMIN — ENOXAPARIN SODIUM 40 MG: 100 INJECTION SUBCUTANEOUS at 08:43

## 2024-09-16 RX ADMIN — LEVOTHYROXINE SODIUM 25 MCG: 0.03 TABLET ORAL at 05:09

## 2024-09-16 RX ADMIN — HYDRALAZINE HYDROCHLORIDE 100 MG: 50 TABLET ORAL at 14:35

## 2024-09-16 RX ADMIN — SODIUM CHLORIDE, PRESERVATIVE FREE 10 ML: 5 INJECTION INTRAVENOUS at 21:00

## 2024-09-16 RX ADMIN — SODIUM CHLORIDE, PRESERVATIVE FREE 10 ML: 5 INJECTION INTRAVENOUS at 08:43

## 2024-09-16 NOTE — CARE COORDINATION
Cm met with pt at bedside. Cm discussed pt having a dc order today. Cm asked pt about going to a rehab vs going home. Pt states he will go home and he and his daughters will figure out the ferrell catheter together. Pt asked Cm to call his daughter Mariel to discuss.    CM spoke with Mariel on telephone. Cm informed pyannabelle stated pt has capacity to make own decisions. Cm informed pt wanting to go home. She is concerned about pt going with ferrell cath as he does not know how to empty it. She stated if pt insists on going home she will drive him.    3:00 PM  Cm spoke with nursing. DC order has been discontinued.     CM discussed with nursing doing ferrell care and catheter training when they empty ferrell. Pt needs to be able to take care of on own as he refuses going to SNF at dc.     Cm spoke with Mariel. Cm informed dc has been discontinued. CM informed Mariel about staff having pt empty ferrell cath each time for re-inforcement. Mariel also asked when pt is discharged that she be present to go over new medications so she can correctly set up daily medications for pt.    CM informed nursing regarding daughter Mariel and dc instructions when ready.

## 2024-09-16 NOTE — PROGRESS NOTES
Physical Therapy  Facility/Department: [place] [address] [facility]  Daily Treatment Note  NAME: Javier De La Torre  : 1929  MRN: [id number]    Date of Service: 2024    Discharge Recommendations:  Subacute/Skilled Nursing Facility   PT Equipment Recommendations  Equipment Needed: No  Other: TBD as pt progresses with therapy -- may require RW if discharging directly home    Patient Diagnosis(es): The primary encounter diagnosis was Hypotension, unspecified hypotension type. Diagnoses of Renal insufficiency and Atrial fibrillation, unspecified type (HCC) were also pertinent to this visit.    Assessment  Assessment: pt tolerated bed mobility training, transfer training, and gait training, limited by elevated HR to almost 140 at end of distance and unable to recover in standing below 120, no further symptoms observed  beyond shortness of breath, pt recovered quickly in sitting, pt progressing toward meeting PT goals as evidenced by increased gait distance and less assist required with transfers, pt will benefit from continued Acute Inpatient Skilled PT to address functional mobility deficits, cont to rec SNF at DC  Activity Tolerance: Patient tolerated treatment well  Equipment Needed: No  Other: TBD as pt progresses with therapy -- may require RW if discharging directly home    Plan  Physical Therapy Plan  General Plan: 3-5 times per week  Specific Instructions for Next Treatment: Progress ther ex and mobility as tolerated, gait training progressing to cane if possible  Current Treatment Recommendations: Strengthening;Balance training;Functional mobility training;Transfer training;Endurance training;Gait training;Neuromuscular re-education;Home exercise program;Safety education & training;Patient/Caregiver education & training;Equipment evaluation, education, & procurement;Therapeutic activities;Co-Treatment    Restrictions  Restrictions/Precautions  Restrictions/Precautions: General Precautions, Fall Risk, Bed

## 2024-09-16 NOTE — PROGRESS NOTES
[facility] Progress Note           Subjective/   89 y.o. year old male who we are seeing in consultation for HTN, CKD stage 3b.     HPI:  The patient was seen and examined; he feels well today with no CP, SOB, nausea or vomiting.    ROS: No fever or chills.  Social: No family at bedside.    Objective/   GEN:  Chronically ill, BP (!) 168/65   Pulse 64   Temp 97.7 °F (36.5 °C) (Oral)   Resp 18   Ht 1.829 m (6')   Wt 67.3 kg (148 lb 4.8 oz)   SpO2 96%   BMI 20.11 kg/m²     HEENT: non-icteric, no JVD  CV: S1, S2 without m/r/g; no LE edema  RESP: CTA B without w/r/r; breathing wnl  ABD: +bs, soft, nt, no hsm  SKIN: warm, no rashes    Data/  Recent Labs     09/14/24  0527 09/15/24  0458 09/16/24  0439   WBC 5.9 5.5 6.9   HGB 10.6* 10.6* 10.9*   HCT 32.3* 30.6* 31.9*   .1* 99.3 98.9    130* 159     Recent Labs     09/14/24  0527 09/15/24  0458 09/16/24  0439    138 139   K 3.7 3.3* 3.9    106 107   CO2 23 23 23   GLUCOSE 110* 97 93   PHOS 2.5  --   --    MG  --  1.70*  --    BUN 33* 28* 27*   CREATININE 1.3 1.3 1.5*   LABGLOM 51* 51* 43*       Assessment/     - Hypertension - labile     - Chronic kidney disease stage 3b - background HTN +/- Urinary retention                Baseline SCr variable 1.2-1.5 range at its best, as high as ~2 range in past     - Urinary retention - requiring ferrell PTA    Plan/     - BP control per Cardiology    - Continue Ferrell management for now    - Trend labs, bp's, weights, & urine output     ____________________________________  Gómez Garvin MD  [facility]  [email or web address]  Office: [phone]

## 2024-09-16 NOTE — PROGRESS NOTES
Occupational Therapy  Facility/Department: [facility]  Daily Treatment Note  NAME: Javier De La Torre  : 1929  MRN: [id number]    Date of Service: 2024    Discharge Recommendations:  Subacute/Skilled Nursing Facility  OT Equipment Recommendations  Other: Pt is very impulsive & is resistant to education on home safety. Pt does not have 24/7 care at home.  Therapy discharge recommendations are subject to collaboration from the patient’s interdisciplinary healthcare team, including MD and case management recommendations.    Barriers to Home Discharge:   [] Steps to access home entry or bed/bath:   [x] Unable to transfer, ambulate, or propel wheelchair household distances without assist   [x] Limited available assist at home upon discharge    [] Patient or family requests d/c to post-acute facility    [x] Poor cognition/safety awareness for d/c to home alone    [] Unable to maintain ordered weight bearing status    [] Patient with salient signs of long-standing immobility   [x] Decreased independence with ADLs   [x] Increased risk for falls   [] Other:    If pt is unable to be seen after this session, please let this note serve as discharge summary.  Please see case management note for discharge disposition.  Thank you.       Patient Diagnosis(es): The primary encounter diagnosis was Hypotension, unspecified hypotension type. Diagnoses of Renal insufficiency and Atrial fibrillation, unspecified type (HCC) were also pertinent to this visit.     Assessment   Assessment: Pt in bed and was agreeable to therapy. Pt tends to get side tracked when completing therapy, pt is very impulsive. Pt walked to bathroom with RW and CGA , pt walks very quickly with the RW and forgets that his ferrell is on the walker. Pt was able to transfer from toilet with SBA. Pt completed 5 B UE exercises, pt was able to follow directions with repeated VC. Educated pt on home safety and pt was resistant to education at this time. Pt does not

## 2024-09-16 NOTE — PROGRESS NOTES
IntraVENous ONCE PRN SLADE Torres Jr., MD        hydrALAZINE (APRESOLINE) injection 10 mg  10 mg IntraVENous Q6H PRN Gregorio Hernandez APRN - CNP   10 mg at 09/12/24 2316    cloNIDine (CATAPRES) tablet 0.1 mg  0.1 mg Oral Q4H PRN Raoul Menendez MD   0.1 mg at 09/15/24 1207    aspirin EC tablet 81 mg  81 mg Oral Daily Mehul Parham MD   81 mg at 09/16/24 0843    atorvastatin (LIPITOR) tablet 80 mg  80 mg Oral Daily Mehul Parham MD   80 mg at 09/16/24 0843    [Held by provider] tamsulosin (FLOMAX) capsule 0.4 mg  0.4 mg Oral Daily Mehul Parham MD   0.4 mg at 09/09/24 0937    sodium chloride flush 0.9 % injection 5-40 mL  5-40 mL IntraVENous 2 times per day Mehul Parham MD   10 mL at 09/16/24 0843    sodium chloride flush 0.9 % injection 5-40 mL  5-40 mL IntraVENous PRN Mehul Parham MD   5 mL at 09/11/24 0755    0.9 % sodium chloride infusion   IntraVENous PRN Mehul Parham MD        ondansetron (ZOFRAN-ODT) disintegrating tablet 4 mg  4 mg Oral Q8H PRN Mehul Parham MD        Or    ondansetron (ZOFRAN) injection 4 mg  4 mg IntraVENous Q6H PRN Mehul Parham MD        polyethylene glycol (GLYCOLAX) packet 17 g  17 g Oral Daily PRN Mehul Parham MD        acetaminophen (TYLENOL) tablet 650 mg  650 mg Oral Q6H PRN Mehul Parham MD   650 mg at 09/14/24 2041    Or    acetaminophen (TYLENOL) suppository 650 mg  650 mg Rectal Q6H PRN Mehul Parham MD           Objective:     Telemetry monitor: Telemetry independently reviewed and interpreted by me today and shows: atrial fibrillation     Physical Exam:  Constitutional and general appearance: alert, cooperative, no distress, and appears stated age  HEENT: PERRL, no cervical lymphadenopathy. No masses palpable. Normal oral mucosa  Respiratory:  Normal excursion and expansion without use of accessory muscles  Resp auscultation: Normal breath sounds without wheezing, rhonchi, and

## 2024-09-16 NOTE — PROGRESS NOTES
Capacity Evaluation Note      I have evaluated this patient for capacity.  At this time the patient does have capacity to make medical decisions.  Please see supporting documentation below.    Does the patient have an impairment of or disturbance to the function of the brain/mind which impacts decisional capacity?  no    Relevant medical or mental health condition(s):    Afib,  renal insufficiency, hypotension      Is the patient able to communicate their decision?   Yes, via verbal communication    At this time the patient is:    able to understand their medical or mental health condition(s)  able to understand the proposed treatment  able to understand the alternative to proposed treatment  able to understand the consequences of refusing proposed treatment  able to appreciate consequences of accepting treatment  able to communicate their decision        This patient Does not require an Imposed Delay of Discharge (medical hold) and will notify the appropriate POA or medical decision maker of this decision.    The patient has an advanced directive No.        Electronically signed by Jared Ibrahim MD on 9/16/2024 at 10:24 AM

## 2024-09-16 NOTE — DISCHARGE INSTR - COC
Continuity of Care Form    Patient Name: Javier De La Torre   :  1929  MRN:  [id number]    Admit date:  2024  Discharge date:  2024    Code Status Order: Full Code   Advance Directives:   Advance Care Flowsheet Documentation             Admitting Physician:  Mehul Parham MD  PCP: Keyla Juan    Discharging Nurse: Meredith VELASQUEZ  Discharging Hospital Unit/Room#: 0421/0421-01  Discharging Unit Phone Number: [phone]    Emergency Contact:   Extended Emergency Contact Information  Primary Emergency Contact: Mariel Juan   [facility]  Home Phone: [phone]  Relation: Child  Secondary Emergency Contact: Haley Mendoza  Home Phone: [phone]  Relation: Child    Past Surgical History:  Past Surgical History:   Procedure Laterality Date    CARDIAC SURGERY  2016    open heart       Immunization History:     There is no immunization history on file for this patient.    Active Problems:  Patient Active Problem List   Diagnosis Code    Hypotension I95.9    PAF (paroxysmal atrial fibrillation) ([facility]) I48.0    Typical atrial flutter ([facility]) I48.3    Renal insufficiency N28.9    ASHD (arteriosclerotic heart disease) I25.10    Chronic heart failure with preserved ejection fraction ([facility]) I50.32    Primary hypertension I10       Isolation/Infection:   Isolation            No Isolation          Patient Infection Status       None to display            Nurse Assessment:  Last Vital Signs: BP (!) 168/65   Pulse 64   Temp 97.7 °F (36.5 °C) (Oral)   Resp 18   Ht 1.829 m (6')   Wt 67.3 kg (148 lb 4.8 oz)   SpO2 96%   BMI 20.11 kg/m²     Last documented pain score (0-10 scale): Pain Level: 3  Last Weight:   Wt Readings from Last 1 Encounters:   24 67.3 kg (148 lb 4.8 oz)     Mental Status:  disoriented, alert, and coherent    IV Access:  - None    Nursing Mobility/ADLs:  Walking   Assisted  Transfer  Assisted  Bathing  Assisted  Dressing  Assisted  Toileting  Assisted  Feeding   Independent  Med Admin  Assisted  Med Delivery   whole    Wound Care Documentation and Therapy:        Elimination:  Continence:   Bowel: No  Bladder: ferrell  Urinary Catheter:  chronic retention per urology    Colostomy/Ileostomy/Ileal Conduit: No       Date of Last BM: 09/17/24     Intake/Output Summary (Last 24 hours) at 9/16/2024 1305  Last data filed at 9/16/2024 0936  Gross per 24 hour   Intake 300 ml   Output 1375 ml   Net -1075 ml     I/O last 3 completed shifts:  In: 1020 [P.O.:1020]  Out: 2625 [Urine:2625]    Safety Concerns:     Sundowners Sundrome and At Risk for Falls    Impairments/Disabilities:      None    Nutrition Therapy:  Current Nutrition Therapy:   - Oral Diet:  General    Routes of Feeding: Oral  Liquids: Thin Liquids  Daily Fluid Restriction: no  Last Modified Barium Swallow with Video (Video Swallowing Test): not done    Treatments at the Time of Hospital Discharge:   Respiratory Treatments: x  Oxygen Therapy:  is not on home oxygen therapy.  Ventilator:    - No ventilator support    Rehab Therapies: Physical Therapy and Occupational Therapy  Weight Bearing Status/Restrictions: No weight bearing restrictions  Other Medical Equipment (for information only, NOT a DME order):  cane and walker  Other Treatments: x    Patient's personal belongings (please select all that are sent with patient):  All belongings packed at bedisde    RN SIGNATURE:  Electronically signed by Meredith Leach RN on 9/17/24 at 6:07 PM EDT    CASE MANAGEMENT/SOCIAL WORK SECTION    Inpatient Status Date: 9/8/2024    Readmission Risk Assessment Score:  Readmission Risk              Risk of Unplanned Readmission:  17           Discharging to Facility/ Agency   Name: Melia Hernández  Phone:[phone]          / signature: Electronically signed by Sandra Kwok RN on 9/17/24 at 3:09 PM EDT    PHYSICIAN SECTION    Prognosis: Fair    Condition at Discharge: Stable    Rehab Potential (if transferring to Rehab):

## 2024-09-16 NOTE — PROGRESS NOTES
V2.0    [facility] Progress Note      Name:  Javier De La Torre /Age/Sex: 1929  (89 y.o. male)   MRN & CSN:  [id number] & [id number] Encounter Date/Time: 2024 8:18 AM EDT   Location:  042042-01 PCP: Keyla Juan     Attending:Galdino Kidd MD       Hospital Day: 9    Assessment and Recommendations   Javier De La Torre is a 89 y.o. male with pmh of hypertension, benign prostate hypertrophy, hyperlipidemia who presents with Hypotension      Interval Events:   Patient observed sitting upright in no acute distress.  Found to be bradycardia to 30s to 40s and telemetry showed episodes of A-fib/a flutter, accelerated junctional rhythms with right bundle branch block last night.  Psychiatry seen over the weekend, determined patient has full capacity.  Will discharge to home tomorrow.    Plan:   Hypertension   -Nephrology consulted for difficult to manage blood pressures  -Continue lisinopril, hydralazine, clonidine as needed  -Holding metoprolol  -Flomax discontinued    Afib w SVR  -Consulted by cardiology for new onset A-fib with slow ventricular response/atrial flutter  - [id number] = 4  -CTA:  Left Atrium: The left atrial appendage appears previously ligated. There is no significant residual pouch. No thrombus visualized.There is native coronary artery disease. Small to moderate bilateral pleural effusions, right greater than left.     ECHO   :   Left Ventricle: EF of 55 - 60%. Left ventricle is smaller than normal.  Mildly increased wall thickness. Findings consistent with concentric hypertrophy. Normal wall motion. Grade III diastolic dysfunction with increased LAP.     Right Ventricle: Severe RV dilation and pulmonary HTN  -Continue atorvastatin  -Continue aspirin 81 mg      CKD-3B  -Creatinine now at 1.5  -Will follow-up renal function panel  -Unclear baseline  -Defer to nephrology for management of apparent AURELIO in the setting of difficult to control blood pressures, appreciate their

## 2024-09-16 NOTE — PROGRESS NOTES
Nutrition Note         Low Risk Nutrition Note     Reason for Visit:   Length of Stay    Nutrition Assessment:  Pt with PMH of HTN and HLD who presented with weakness. Pt eating well since admission, mostly >75% of all meals on Regular diet. No nutrition interventions needed at this time.    Current Nutrition Therapies:    ADULT DIET; Regular    Anthropometrics:   Current Height: 182.9 cm (6')  Current Weight - Scale: 67.3 kg (148 lb 4.8 oz)      Monitoring and Evaluation:  No nutrition diagnosis. Patient will be monitored per nutrition standards of care.     Consult Dietitian if nutrition intervention essential to patient care is needed.     Discharge Planning:  No needs    Anne-Marie Novak RD, HODA      Electronically signed by Anne-Marie Novak RD, LD on 9/16/24 at 12:45 PM EDT    Contact: [phone]

## 2024-09-16 NOTE — PLAN OF CARE
Problem: Safety - Adult  Goal: Free from fall injury  9/15/2024 2048 by Sienna Calvert RN  Outcome: Progressing  9/15/2024 1757 by Behzad Fofana RN  Outcome: Progressing     Problem: Discharge Planning  Goal: Discharge to home or other facility with appropriate resources  9/15/2024 2048 by Sienna Calvert RN  Outcome: Progressing  9/15/2024 1757 by Behzad Fofana RN  Outcome: Progressing     Problem: Skin/Tissue Integrity  Goal: Absence of new skin breakdown  Description: 1.  Monitor for areas of redness and/or skin breakdown  2.  Assess vascular access sites hourly  3.  Every 4-6 hours minimum:  Change oxygen saturation probe site  4.  Every 4-6 hours:  If on nasal continuous positive airway pressure, respiratory therapy assess nares and determine need for appliance change or resting period.  9/15/2024 2048 by Sienna Calvert RN  Outcome: Progressing  9/15/2024 1757 by Behzad Fofana RN  Outcome: Progressing     Problem: ABCDS Injury Assessment  Goal: Absence of physical injury  9/15/2024 2048 by Sienna Calvert RN  Outcome: Progressing  9/15/2024 1757 by Behzad Fofana RN  Outcome: Progressing     Problem: Pain  Goal: Verbalizes/displays adequate comfort level or baseline comfort level  9/15/2024 2048 by Sienna Calvert RN  Outcome: Progressing  9/15/2024 1757 by Behzad Fofana RN  Outcome: Progressing

## 2024-09-16 NOTE — PROGRESS NOTES
PALLIATIVE MEDICINE PROGRESS NOTE     Patient name:Javier De La Torre    MRN:[id number] :1929  Room/Bed:[facility]0421-01    LOS: 8 days        ASSESSMENT/RECOMMENDATIONS     89 y.o. male from home alone admitted with urinary retention and difficulty managing his ferrell bag at home.  Appears he is having overall functional decline at home, but refuses to be in a SNF.  Since admission, patient has had labile blood pressures, atrial fibrillation with SVR, AURELIO and some possible confusion.  Determining goals of care has been very challenging due to possible confusion, difficulty in hearing and personality issues.   Per family, he has a known history of being obstinate and difficult.  He has been reassessed by psychiatry and determined to have capacity for medical decision making.        Goals of care:  Optimization, as possible.  Refuses SNF.  Refuses to let anyone into his home to help him other than family.  Wants to return home with daughter's occasional assistance.  Will continue with outpatient medical appointments [facility].  Wants to do whatever is recommended by a physician, so long as long as he personally agrees it is necessary.   Would want attempt at resuscitation.  Full code/full support        Patient/Family Goals of Care :    24    Called and spoke with daughter, Mariel, by phone.  Updated her about psych's assessment and that we are still uncertain of patient's capacity to make decisions about his health care.  We spent time talking about patient's different health issues including his AF.  We talked about the atrial clip procedure and the risks of blood thinners.  Mariel states patient needs to go into LTC, but he is stubborn and will not agree to it. He won't listen to her.  She became very tearful discussing his care, and says she feels like he needs to have his symptoms managed without any invasive medical procedures.  She wants him to have a natural and peaceful death.  She does not

## 2024-09-17 VITALS
WEIGHT: 150.35 LBS | BODY MASS INDEX: 20.36 KG/M2 | DIASTOLIC BLOOD PRESSURE: 71 MMHG | HEART RATE: 116 BPM | SYSTOLIC BLOOD PRESSURE: 145 MMHG | HEIGHT: 72 IN | OXYGEN SATURATION: 96 % | TEMPERATURE: 98.8 F | RESPIRATION RATE: 18 BRPM

## 2024-09-17 LAB
ALBUMIN SERPL-MCNC: 3.2 G/DL (ref 3.4–5)
ALBUMIN/GLOB SERPL: 1.3 {RATIO} (ref 1.1–2.2)
ALP SERPL-CCNC: 96 U/L (ref 40–129)
ALT SERPL-CCNC: 21 U/L (ref 10–40)
ANION GAP SERPL CALCULATED.3IONS-SCNC: 7 MMOL/L (ref 3–16)
AST SERPL-CCNC: 28 U/L (ref 15–37)
BASOPHILS # BLD: 0 K/UL (ref 0–0.2)
BASOPHILS NFR BLD: 0.3 %
BILIRUB SERPL-MCNC: 1.1 MG/DL (ref 0–1)
BUN SERPL-MCNC: 25 MG/DL (ref 7–20)
CALCIUM SERPL-MCNC: 8.8 MG/DL (ref 8.3–10.6)
CHLORIDE SERPL-SCNC: 106 MMOL/L (ref 99–110)
CO2 SERPL-SCNC: 26 MMOL/L (ref 21–32)
CREAT SERPL-MCNC: 1.4 MG/DL (ref 0.8–1.3)
DEPRECATED RDW RBC AUTO: 15.8 % (ref 12.4–15.4)
EOSINOPHIL # BLD: 0.2 K/UL (ref 0–0.6)
EOSINOPHIL NFR BLD: 2.2 %
GFR SERPLBLD CREATININE-BSD FMLA CKD-EPI: 46 ML/MIN/{1.73_M2}
GLUCOSE SERPL-MCNC: 107 MG/DL (ref 70–99)
HCT VFR BLD AUTO: 31.3 % (ref 40.5–52.5)
HGB BLD-MCNC: 10.6 G/DL (ref 13.5–17.5)
LYMPHOCYTES # BLD: 1.4 K/UL (ref 1–5.1)
LYMPHOCYTES NFR BLD: 19.2 %
MCH RBC QN AUTO: 33.3 PG (ref 26–34)
MCHC RBC AUTO-ENTMCNC: 33.8 G/DL (ref 31–36)
MCV RBC AUTO: 98.7 FL (ref 80–100)
MONOCYTES # BLD: 0.7 K/UL (ref 0–1.3)
MONOCYTES NFR BLD: 9.6 %
NEUTROPHILS # BLD: 5 K/UL (ref 1.7–7.7)
NEUTROPHILS NFR BLD: 68.7 %
PLATELET # BLD AUTO: 170 K/UL (ref 135–450)
PMV BLD AUTO: 9 FL (ref 5–10.5)
POTASSIUM SERPL-SCNC: 3.9 MMOL/L (ref 3.5–5.1)
PROT SERPL-MCNC: 5.7 G/DL (ref 6.4–8.2)
RBC # BLD AUTO: 3.17 M/UL (ref 4.2–5.9)
SODIUM SERPL-SCNC: 139 MMOL/L (ref 136–145)
WBC # BLD AUTO: 7.2 K/UL (ref 4–11)

## 2024-09-17 PROCEDURE — 85025 COMPLETE CBC W/AUTO DIFF WBC: CPT

## 2024-09-17 PROCEDURE — 6370000000 HC RX 637 (ALT 250 FOR IP): Performed by: INTERNAL MEDICINE

## 2024-09-17 PROCEDURE — 97530 THERAPEUTIC ACTIVITIES: CPT

## 2024-09-17 PROCEDURE — 97110 THERAPEUTIC EXERCISES: CPT

## 2024-09-17 PROCEDURE — 2580000003 HC RX 258: Performed by: INTERNAL MEDICINE

## 2024-09-17 PROCEDURE — 6370000000 HC RX 637 (ALT 250 FOR IP)

## 2024-09-17 PROCEDURE — 36415 COLL VENOUS BLD VENIPUNCTURE: CPT

## 2024-09-17 PROCEDURE — 80053 COMPREHEN METABOLIC PANEL: CPT

## 2024-09-17 PROCEDURE — 97116 GAIT TRAINING THERAPY: CPT

## 2024-09-17 PROCEDURE — 6360000002 HC RX W HCPCS: Performed by: INTERNAL MEDICINE

## 2024-09-17 RX ADMIN — ATORVASTATIN CALCIUM 80 MG: 80 TABLET, FILM COATED ORAL at 08:27

## 2024-09-17 RX ADMIN — LEVOTHYROXINE SODIUM 25 MCG: 0.03 TABLET ORAL at 05:05

## 2024-09-17 RX ADMIN — ENOXAPARIN SODIUM 40 MG: 100 INJECTION SUBCUTANEOUS at 08:26

## 2024-09-17 RX ADMIN — SODIUM CHLORIDE, PRESERVATIVE FREE 10 ML: 5 INJECTION INTRAVENOUS at 08:27

## 2024-09-17 RX ADMIN — ASPIRIN 81 MG: 81 TABLET, COATED ORAL at 08:27

## 2024-09-17 RX ADMIN — LISINOPRIL 20 MG: 20 TABLET ORAL at 08:27

## 2024-09-17 RX ADMIN — HYDRALAZINE HYDROCHLORIDE 100 MG: 50 TABLET ORAL at 05:05

## 2024-09-17 NOTE — PROGRESS NOTES
Occupational Therapy  Facility/Department: [facility] C4 PCU  Daily Treatment Note  NAME: Javier De La Torre  : 1929  MRN: [id number]    Date of Service: 2024    Discharge Recommendations:  Subacute/Skilled Nursing Facility       Therapy discharge recommendations are subject to collaboration from the patient’s interdisciplinary healthcare team, including MD and case management recommendations.    Barriers to Home Discharge:   [x] Steps to access home entry or bed/bath:   [x] Unable to transfer, ambulate, or propel wheelchair household distances without assist   [x] Limited available assist at home upon discharge    [] Patient or family requests d/c to post-acute facility    [x] Poor cognition/safety awareness for d/c to home alone    [] Unable to maintain ordered weight bearing status    [] Patient with salient signs of long-standing immobility   [x] Decreased independence with ADLs   [x] Increased risk for falls   [] Other:    If pt is unable to be seen after this session, please let this note serve as discharge summary.  Please see case management note for discharge disposition.  Thank you.    Patient Diagnosis(es): The primary encounter diagnosis was Hypotension, unspecified hypotension type. Diagnoses of Renal insufficiency and Atrial fibrillation, unspecified type (HCC) were also pertinent to this visit.     Assessment   Assessment: pt seen for OT session this date. Pt now agreeable to SNF, realizing he required increased assistance. Pt completed bed mobility with SBA, functional transfers/mobility with CGA with RW. Required increased verbal cues for safety. Pt would continue to benefit from acute OT at this time to improve functional mobility and ADL independence. D/c recs for SNF when medically ready.     Activity Tolerance: Patient tolerated treatment well  Discharge Recommendations: Subacute/Skilled Nursing Facility     Plan  Occupational Therapy Plan  Times Per Week: 3-5x/ week  Current Treatment

## 2024-09-17 NOTE — PROGRESS NOTES
Discharge instructions, along with upcoming appointment information and new medications reviewed with patient; pt verbalized understanding. Tele monitor removed & logged, CMU aware. Peripheral IV removed w/o complication. Patient dressed and personal belongings packed. Report given to EMS transport staff. Copy of [facility] and [facility] in chart. Report called to Regina VELASQUEZ at the [facility] at 1820 PM. EMS transport en route to facility.

## 2024-09-17 NOTE — PROGRESS NOTES
[facility] Progress Note           Subjective/   89 y.o. year old male who we are seeing in consultation for HTN, CKD stage 3b.     HPI:  The patient was seen and examined; he was resting comfortably with no acute events noted overnight.    ROS: No fever or chills.  Social: No family at bedside.    Objective/   GEN:  Chronically ill, BP (!) 145/71 Comment: post-amb  Pulse (!) 116   Temp 98.8 °F (37.1 °C) (Oral)   Resp 18   Ht 1.829 m (6')   Wt 68.2 kg (150 lb 5.7 oz)   SpO2 96%   BMI 20.39 kg/m²     HEENT: non-icteric, no JVD  CV: S1, S2 without m/r/g; no LE edema  RESP: CTA B without w/r/r; breathing wnl  ABD: +bs, soft, nt, no hsm  SKIN: warm, no rashes    Data/  Recent Labs     09/15/24  0458 09/16/24  0439 09/17/24  0509   WBC 5.5 6.9 7.2   HGB 10.6* 10.9* 10.6*   HCT 30.6* 31.9* 31.3*   MCV 99.3 98.9 98.7   * 159 170     Recent Labs     09/15/24  0458 09/16/24  0439 09/16/24  1554 09/17/24  0509    139  --  139   K 3.3* 3.9  --  3.9    107  --  106   CO2 23 23  --  26   GLUCOSE 97 93  --  107*   MG 1.70*  --  1.90  --    BUN 28* 27*  --  25*   CREATININE 1.3 1.5*  --  1.4*   LABGLOM 51* 43*  --  46*       Assessment/     - Hypertension - labile     - Chronic kidney disease stage 3b - background HTN +/- Urinary retention                Baseline SCr variable 1.2-1.5 range at its best, as high as ~2 range in past     - Urinary retention - requiring ferrell PTA    Plan/     - BP control per Cardiology    - Trend labs, bp's, weights, & urine output     ____________________________________  Gómez Garvin MD  [facility]  [email or web address]  Office: [phone]

## 2024-09-17 NOTE — CARE COORDINATION
CASE MANAGEMENT DISCHARGE SUMMARY      Discharge to:     Precertification completed:   Hospital Exemption Notification (HENS) completed:     IMM given: (date)     New Durable Medical Equipment ordered/agency:     Transportation:    Family/car:   Medical Transport explained to pt/family. Pt/family voice no agency preference.    Agency used:   time:   Ambulance form completed: Yes    Confirmed discharge plan with:     Patient: yes/no     Family:  yes/no    Name: Contact number:     Facility/Agency, name:  [facility] faxed   Phone number for report to facility:      RN, name:     Note: Discharging nurse to complete [facility], reconcile AVS, and place final copy with patient's discharge packet. RN to ensure that written prescriptions for  Level II medications are sent with patient to the facility as per protocol.

## 2024-09-17 NOTE — PROGRESS NOTES
Physical Therapy  Facility/Department: [facility] C4 PCU  Daily Treatment Note  NAME: Javier De La Torre  : 1929  MRN: [id number]    Date of Service: 2024    Discharge Recommendations:  Subacute/Skilled Nursing Facility   PT Equipment Recommendations  Equipment Needed: No  Other: TBD as pt progresses with therapy -- may require RW if discharging directly home    Therapy discharge recommendations are subject to collaboration from the patient’s interdisciplinary healthcare team, including MD and case management recommendations.    Barriers to Home Discharge:   [] Steps to access home entry or bed/bath:   [x] Unable to transfer, ambulate, or propel wheelchair household distances without assist   [x] Limited available assist at home upon discharge (pt lives alone and has no 24-hr sup/assist available)    [x] Patient or family requests d/c to post-acute facility    [x] Poor cognition/safety awareness for d/c to home alone    [] Unable to maintain ordered weight bearing status    [] Patient with salient signs of long-standing immobility   [x] Decreased independence with ADLs   [x] Increased risk for falls    If pt is unable to be seen after this session, please let this note serve as discharge summary.  Please see case management note for discharge disposition.  Thank you.    Patient Diagnosis(es): The primary encounter diagnosis was Hypotension, unspecified hypotension type. Diagnoses of Renal insufficiency and Atrial fibrillation, unspecified type (HCC) were also pertinent to this visit.    Assessment  Assessment: Pt participated well when seen for PT follow-up this date.  Pt progressing well since last seen by PT, now performing functional mobility tasks with CGA/close SBA x 1 with support of RW for standing tasks.  Pt requiring occasional cues for safe transfer sequencing and management of clothing (i.e. donning briefs).  Pt still limited in overall strength and level of (I) with mobility compared to baseline LOF

## 2024-09-17 NOTE — DISCHARGE INSTRUCTIONS
Please follow-up with PCP for management of levothyroxine.    Please following up with cardiologist

## 2024-09-17 NOTE — DISCHARGE SUMMARY
V2.0  Discharge Summary    Name:  Javier De La Torre /Age/Sex: 1929 (89 y.o. male)   Admit Date: 2024  Discharge Date: 24    MRN & CSN:  [id number] & [id number] Encounter Date and Time 24 1:55 PM EDT    Attending:  Galdino Kidd MD Discharging Provider: Deepak Lei MD       Hospital Course:     Brief HPI: Javier De La Torre is a 89 y.o. male who presented with hypotension and urinary retention.    Brief Problem Based Course:     Hypertension   Admission complicated by difficult to manage blood pressures.  Discontinued his home hypertensive meds metoprolol and lisinopril initially due to concerns for hypotension.  Shortly after they were discontinued, he had persistent elevated blood pressures getting as high as 200s over 100s, and required several doses of PRN hydralazine and clonidine.  Nephrology was consulted for assistance.  Patient discharged on lisinopril and hydralazine for hypertension        Afib w SVR  -Consulted by cardiology for new onset A-fib with slow ventricular response/atrial flutter.  He received a echo and a CTA to evaluate a known left atrial appendage results summarized below:  -CTA:  Left Atrium: The left atrial appendage appears previously ligated. There is no significant residual pouch. No thrombus visualized.There is native coronary artery disease. Small to moderate bilateral pleural effusions, right greater than left.     ECHO   :   Left Ventricle: EF of 55 - 60%. Left ventricle is smaller than normal.  Mildly increased wall thickness. Findings consistent with concentric hypertrophy. Normal wall motion. Grade III diastolic dysfunction with increased LAP.      Right Ventricle: Severe RV dilation and pulmonary HTN    Patient will continue atorvastatin 80 and aspirin 81 mg on discharge.        CKD-3B  Patient's GFR consistent with stage IIIb CKD.  His creatinine remained stable for most of his admission.  He had a baseline of around 1.3 and elevated to 2.0  BILIRUBINUR Negative 09/08/2024 03:07 AM    BLOODU LARGE 09/08/2024 03:07 AM    GLUCOSEU Negative 09/08/2024 03:07 AM    KETUA Negative 09/08/2024 03:07 AM       Time Spent Discharging patient 40 minutes    Electronically signed by Deepak Lei MD on 9/17/2024 at 1:55 PM

## 2024-09-17 NOTE — CARE COORDINATION
Patient deemed to have capacity per psych and he is refusing SNF or home care. Nursing to do ferrell cath care education prior to d/c.

## 2024-09-17 NOTE — CARE COORDINATION
CASE MANAGEMENT DISCHARGE SUMMARY      Discharge to: [facility]    Precertification completed: NA/not [facility] for [facility]  Hospital Exemption Notification (HENS) completed:     IMM given: (date) 9/17    New Durable Medical Equipment ordered/agency: defer to SNF    Transportation:       Medical Transport explained to pt/family. Pt/family voice no agency preference.    Agency used:[facility]   time: 6 PM   Ambulance form completed: Yes    Confirmed discharge plan with: Joel SIMEON      Patient: yes     Family:  yes    Name: Mariel Contact number:[phone]     Facility/Agency, name:  [facility] faxed   Phone number for report to facility: [phone]     RN, name: Meredith

## 2024-11-21 ENCOUNTER — HOSPITAL ENCOUNTER (INPATIENT)
Age: 88
LOS: 8 days | Discharge: NURSING, REHAB OR LONG-TERM CARE | End: 2024-11-29
Attending: EMERGENCY MEDICINE | Admitting: INTERNAL MEDICINE
Payer: OTHER GOVERNMENT

## 2024-11-21 ENCOUNTER — APPOINTMENT (OUTPATIENT)
Dept: CT IMAGING | Age: 88
End: 2024-11-21
Payer: OTHER GOVERNMENT

## 2024-11-21 DIAGNOSIS — N30.01 ACUTE CYSTITIS WITH HEMATURIA: Primary | ICD-10-CM

## 2024-11-21 DIAGNOSIS — I48.91 ATRIAL FIBRILLATION, UNSPECIFIED TYPE (HCC): ICD-10-CM

## 2024-11-21 DIAGNOSIS — N17.9 AKI (ACUTE KIDNEY INJURY) (HCC): ICD-10-CM

## 2024-11-21 DIAGNOSIS — N13.30 HYDRONEPHROSIS, UNSPECIFIED HYDRONEPHROSIS TYPE: ICD-10-CM

## 2024-11-21 DIAGNOSIS — R31.0 GROSS HEMATURIA: ICD-10-CM

## 2024-11-21 PROBLEM — N39.0 UTI (URINARY TRACT INFECTION): Status: ACTIVE | Noted: 2024-11-21

## 2024-11-21 LAB
ALBUMIN SERPL-MCNC: 2.9 G/DL (ref 3.4–5)
ALBUMIN/GLOB SERPL: 0.8 {RATIO} (ref 1.1–2.2)
ALP SERPL-CCNC: 91 U/L (ref 40–129)
ALT SERPL-CCNC: 16 U/L (ref 10–40)
ANION GAP SERPL CALCULATED.3IONS-SCNC: 13 MMOL/L (ref 3–16)
AST SERPL-CCNC: 24 U/L (ref 15–37)
BACTERIA URNS QL MICRO: ABNORMAL /HPF
BASOPHILS # BLD: 0 K/UL (ref 0–0.2)
BASOPHILS NFR BLD: 0.2 %
BILIRUB SERPL-MCNC: 0.7 MG/DL (ref 0–1)
BILIRUB UR QL STRIP.AUTO: NEGATIVE
BUN SERPL-MCNC: 64 MG/DL (ref 7–20)
CALCIUM SERPL-MCNC: 8.4 MG/DL (ref 8.3–10.6)
CHLORIDE SERPL-SCNC: 108 MMOL/L (ref 99–110)
CLARITY UR: ABNORMAL
CO2 SERPL-SCNC: 23 MMOL/L (ref 21–32)
COLOR UR: ABNORMAL
CREAT SERPL-MCNC: 2.4 MG/DL (ref 0.8–1.3)
DEPRECATED RDW RBC AUTO: 15.7 % (ref 12.4–15.4)
EOSINOPHIL # BLD: 0.1 K/UL (ref 0–0.6)
EOSINOPHIL NFR BLD: 0.7 %
GFR SERPLBLD CREATININE-BSD FMLA CKD-EPI: 24 ML/MIN/{1.73_M2}
GLUCOSE SERPL-MCNC: 165 MG/DL (ref 70–99)
GLUCOSE UR STRIP.AUTO-MCNC: NEGATIVE MG/DL
HCT VFR BLD AUTO: 26.5 % (ref 40.5–52.5)
HEMOCCULT SP1 STL QL: NORMAL
HGB BLD-MCNC: 8.6 G/DL (ref 13.5–17.5)
HGB UR QL STRIP.AUTO: ABNORMAL
KETONES UR STRIP.AUTO-MCNC: NEGATIVE MG/DL
LACTATE BLDV-SCNC: 1 MMOL/L (ref 0.4–1.9)
LACTATE BLDV-SCNC: 1.3 MMOL/L (ref 0.4–1.9)
LEUKOCYTE ESTERASE UR QL STRIP.AUTO: ABNORMAL
LYMPHOCYTES # BLD: 2.4 K/UL (ref 1–5.1)
LYMPHOCYTES NFR BLD: 28 %
MCH RBC QN AUTO: 31.6 PG (ref 26–34)
MCHC RBC AUTO-ENTMCNC: 32.5 G/DL (ref 31–36)
MCV RBC AUTO: 97.3 FL (ref 80–100)
MONOCYTES # BLD: 0.5 K/UL (ref 0–1.3)
MONOCYTES NFR BLD: 5.3 %
NEUTROPHILS # BLD: 5.7 K/UL (ref 1.7–7.7)
NEUTROPHILS NFR BLD: 65.8 %
NITRITE UR QL STRIP.AUTO: NEGATIVE
PH UR STRIP.AUTO: 6 [PH] (ref 5–8)
PLATELET # BLD AUTO: 167 K/UL (ref 135–450)
PMV BLD AUTO: 9.4 FL (ref 5–10.5)
POTASSIUM SERPL-SCNC: 4 MMOL/L (ref 3.5–5.1)
PROT SERPL-MCNC: 6.4 G/DL (ref 6.4–8.2)
PROT UR STRIP.AUTO-MCNC: 100 MG/DL
RBC # BLD AUTO: 2.73 M/UL (ref 4.2–5.9)
RBC #/AREA URNS HPF: ABNORMAL /HPF (ref 0–4)
SODIUM SERPL-SCNC: 144 MMOL/L (ref 136–145)
SP GR UR STRIP.AUTO: 1.02 (ref 1–1.03)
UA COMPLETE W REFLEX CULTURE PNL UR: YES
UA DIPSTICK W REFLEX MICRO PNL UR: YES
URN SPEC COLLECT METH UR: ABNORMAL
UROBILINOGEN UR STRIP-ACNC: 0.2 E.U./DL
WBC # BLD AUTO: 8.7 K/UL (ref 4–11)
WBC #/AREA URNS HPF: >100 /HPF (ref 0–5)

## 2024-11-21 PROCEDURE — 85025 COMPLETE CBC W/AUTO DIFF WBC: CPT

## 2024-11-21 PROCEDURE — 81001 URINALYSIS AUTO W/SCOPE: CPT

## 2024-11-21 PROCEDURE — 6360000002 HC RX W HCPCS: Performed by: EMERGENCY MEDICINE

## 2024-11-21 PROCEDURE — 80053 COMPREHEN METABOLIC PANEL: CPT

## 2024-11-21 PROCEDURE — 36415 COLL VENOUS BLD VENIPUNCTURE: CPT

## 2024-11-21 PROCEDURE — 1200000000 HC SEMI PRIVATE

## 2024-11-21 PROCEDURE — 83605 ASSAY OF LACTIC ACID: CPT

## 2024-11-21 PROCEDURE — 6370000000 HC RX 637 (ALT 250 FOR IP): Performed by: INTERNAL MEDICINE

## 2024-11-21 PROCEDURE — 82270 OCCULT BLOOD FECES: CPT

## 2024-11-21 PROCEDURE — 2580000003 HC RX 258: Performed by: EMERGENCY MEDICINE

## 2024-11-21 PROCEDURE — 87086 URINE CULTURE/COLONY COUNT: CPT

## 2024-11-21 PROCEDURE — 74176 CT ABD & PELVIS W/O CONTRAST: CPT

## 2024-11-21 PROCEDURE — 87077 CULTURE AEROBIC IDENTIFY: CPT

## 2024-11-21 PROCEDURE — 93005 ELECTROCARDIOGRAM TRACING: CPT | Performed by: EMERGENCY MEDICINE

## 2024-11-21 PROCEDURE — 87040 BLOOD CULTURE FOR BACTERIA: CPT

## 2024-11-21 PROCEDURE — 99285 EMERGENCY DEPT VISIT HI MDM: CPT

## 2024-11-21 PROCEDURE — 2580000003 HC RX 258: Performed by: INTERNAL MEDICINE

## 2024-11-21 PROCEDURE — 96374 THER/PROPH/DIAG INJ IV PUSH: CPT

## 2024-11-21 RX ORDER — SODIUM CHLORIDE 0.9 % (FLUSH) 0.9 %
5-40 SYRINGE (ML) INJECTION EVERY 12 HOURS SCHEDULED
Status: DISCONTINUED | OUTPATIENT
Start: 2024-11-21 | End: 2024-11-29 | Stop reason: HOSPADM

## 2024-11-21 RX ORDER — 0.9 % SODIUM CHLORIDE 0.9 %
500 INTRAVENOUS SOLUTION INTRAVENOUS ONCE
Status: COMPLETED | OUTPATIENT
Start: 2024-11-21 | End: 2024-11-21

## 2024-11-21 RX ORDER — ACETAMINOPHEN 325 MG/1
650 TABLET ORAL EVERY 6 HOURS PRN
Status: DISCONTINUED | OUTPATIENT
Start: 2024-11-21 | End: 2024-11-29 | Stop reason: HOSPADM

## 2024-11-21 RX ORDER — SODIUM CHLORIDE 0.9 % (FLUSH) 0.9 %
5-40 SYRINGE (ML) INJECTION PRN
Status: DISCONTINUED | OUTPATIENT
Start: 2024-11-21 | End: 2024-11-29 | Stop reason: HOSPADM

## 2024-11-21 RX ORDER — ONDANSETRON 4 MG/1
4 TABLET, ORALLY DISINTEGRATING ORAL EVERY 8 HOURS PRN
Status: DISCONTINUED | OUTPATIENT
Start: 2024-11-21 | End: 2024-11-29 | Stop reason: HOSPADM

## 2024-11-21 RX ORDER — ENOXAPARIN SODIUM 100 MG/ML
30 INJECTION SUBCUTANEOUS DAILY
Status: DISCONTINUED | OUTPATIENT
Start: 2024-11-22 | End: 2024-11-23

## 2024-11-21 RX ORDER — ONDANSETRON 2 MG/ML
4 INJECTION INTRAMUSCULAR; INTRAVENOUS EVERY 6 HOURS PRN
Status: DISCONTINUED | OUTPATIENT
Start: 2024-11-21 | End: 2024-11-29 | Stop reason: HOSPADM

## 2024-11-21 RX ORDER — ATORVASTATIN CALCIUM 80 MG/1
80 TABLET, FILM COATED ORAL DAILY
Status: DISCONTINUED | OUTPATIENT
Start: 2024-11-22 | End: 2024-11-22

## 2024-11-21 RX ORDER — TAMSULOSIN HYDROCHLORIDE 0.4 MG/1
0.8 CAPSULE ORAL DAILY
Status: DISCONTINUED | OUTPATIENT
Start: 2024-11-21 | End: 2024-11-29 | Stop reason: HOSPADM

## 2024-11-21 RX ORDER — SODIUM CHLORIDE 9 MG/ML
INJECTION, SOLUTION INTRAVENOUS CONTINUOUS
Status: DISCONTINUED | OUTPATIENT
Start: 2024-11-21 | End: 2024-11-22

## 2024-11-21 RX ORDER — POLYETHYLENE GLYCOL 3350 17 G/17G
17 POWDER, FOR SOLUTION ORAL DAILY PRN
Status: DISCONTINUED | OUTPATIENT
Start: 2024-11-21 | End: 2024-11-29 | Stop reason: HOSPADM

## 2024-11-21 RX ORDER — 0.9 % SODIUM CHLORIDE 0.9 %
500 INTRAVENOUS SOLUTION INTRAVENOUS ONCE
Status: CANCELLED | OUTPATIENT
Start: 2024-11-21 | End: 2024-11-21

## 2024-11-21 RX ORDER — HYDRALAZINE HYDROCHLORIDE 50 MG/1
100 TABLET, FILM COATED ORAL 3 TIMES DAILY
Status: DISCONTINUED | OUTPATIENT
Start: 2024-11-21 | End: 2024-11-27

## 2024-11-21 RX ORDER — MAGNESIUM HYDROXIDE/ALUMINUM HYDROXICE/SIMETHICONE 120; 1200; 1200 MG/30ML; MG/30ML; MG/30ML
30 SUSPENSION ORAL EVERY 6 HOURS PRN
Status: DISCONTINUED | OUTPATIENT
Start: 2024-11-21 | End: 2024-11-29 | Stop reason: HOSPADM

## 2024-11-21 RX ORDER — SODIUM CHLORIDE 9 MG/ML
INJECTION, SOLUTION INTRAVENOUS PRN
Status: DISCONTINUED | OUTPATIENT
Start: 2024-11-21 | End: 2024-11-29 | Stop reason: HOSPADM

## 2024-11-21 RX ORDER — ACETAMINOPHEN 650 MG/1
650 SUPPOSITORY RECTAL EVERY 6 HOURS PRN
Status: DISCONTINUED | OUTPATIENT
Start: 2024-11-21 | End: 2024-11-29 | Stop reason: HOSPADM

## 2024-11-21 RX ORDER — TAMSULOSIN HYDROCHLORIDE 0.4 MG/1
0.4 CAPSULE ORAL DAILY
COMMUNITY

## 2024-11-21 RX ADMIN — CEFTRIAXONE SODIUM 1000 MG: 1 INJECTION, POWDER, FOR SOLUTION INTRAMUSCULAR; INTRAVENOUS at 17:35

## 2024-11-21 RX ADMIN — TAMSULOSIN HYDROCHLORIDE 0.8 MG: 0.4 CAPSULE ORAL at 21:27

## 2024-11-21 RX ADMIN — SODIUM CHLORIDE, PRESERVATIVE FREE 10 ML: 5 INJECTION INTRAVENOUS at 21:27

## 2024-11-21 RX ADMIN — SODIUM CHLORIDE 500 ML: 9 INJECTION, SOLUTION INTRAVENOUS at 17:35

## 2024-11-21 RX ADMIN — ACETAMINOPHEN 650 MG: 325 TABLET ORAL at 21:27

## 2024-11-21 RX ADMIN — HYDRALAZINE HYDROCHLORIDE 100 MG: 50 TABLET ORAL at 21:27

## 2024-11-21 RX ADMIN — SODIUM CHLORIDE: 9 INJECTION, SOLUTION INTRAVENOUS at 21:28

## 2024-11-21 ASSESSMENT — PAIN DESCRIPTION - DESCRIPTORS: DESCRIPTORS: ACHING

## 2024-11-21 ASSESSMENT — PAIN SCALES - GENERAL
PAINLEVEL_OUTOF10: 8
PAINLEVEL_OUTOF10: 0
PAINLEVEL_OUTOF10: 0
PAINLEVEL_OUTOF10: 1

## 2024-11-21 ASSESSMENT — PAIN - FUNCTIONAL ASSESSMENT
PAIN_FUNCTIONAL_ASSESSMENT: 0-10
PAIN_FUNCTIONAL_ASSESSMENT: 0-10

## 2024-11-21 ASSESSMENT — PAIN DESCRIPTION - LOCATION: LOCATION: PENIS

## 2024-11-21 ASSESSMENT — PAIN DESCRIPTION - ORIENTATION: ORIENTATION: MID

## 2024-11-21 NOTE — ED PROVIDER NOTES
Emergency Department Provider Note  Location: [facility]  ED  11/21/2024     Patient Identification  Javier De La Torre is a 89 y.o. male    Chief Complaint  Abnormal Lab and Hematuria (Per EMS report initial call made for an increase in confusion. They report upon arrival pt GCS 15 and report abnormal labs. Nursing home report stated pt has had a decrease in PO intake and has some hematuria. They report a creat at 2.3 and a BUN at 66. )          HPI  (History provided by patient and EMS report from nursing home)  Patient is a 89-year-old male full code arrives from the [facility] concerns for abnormal labs.  Reportedly creatinine elevated 2.3.  Concern for mild confusion.  Nursing report patient has had decreased p.o. intake and some hematuria.    Patient denies any acute complaints.  He is oriented x 3 states he feels well and at his baseline.      Nursing Notes were all reviewed and agreed with, or any disagreements were addressed in the HPI:  Allergies:   Allergies   Allergen Reactions    Amlodipine Hives    Hydrochlorothiazide Dizziness or Vertigo    Terazosin Hcl Hallucinations    Zetia [Ezetimibe]        Past medical history:  has a past medical history of Hyperlipidemia, Hypertension, and Urinary retention.    Past surgical history:  has a past surgical history that includes Cardiac surgery (2016).    Home medications:   Prior to Admission medications    Medication Sig Start Date End Date Taking? Authorizing Provider   tamsulosin (FLOMAX) 0.4 MG capsule Take 1 capsule by mouth daily   Yes ProviderAleksandr MD   hydrALAZINE (APRESOLINE) 50 MG tablet Take 2 tablets by mouth 3 times daily   Yes ProviderAleksandr MD   atorvastatin (LIPITOR) 80 MG tablet Take 1 tablet by mouth daily Indications: 0.5 tabs at bedtime   Yes ProviderAleksandr MD   lisinopril (PRINIVIL;ZESTRIL) 20 MG tablet Take 1 tablet by mouth daily Indications: 1.5 tab daily  Patient not taking: Reported on 11/21/2024     0.0 0.0 - 0.2 K/uL   Comprehensive Metabolic Panel w/ Reflex to MG   Result Value Ref Range    Sodium 144 136 - 145 mmol/L    Potassium reflex Magnesium 4.0 3.5 - 5.1 mmol/L    Chloride 108 99 - 110 mmol/L    CO2 23 21 - 32 mmol/L    Anion Gap 13 3 - 16    Glucose 165 (H) 70 - 99 mg/dL    BUN 64 (H) 7 - 20 mg/dL    Creatinine 2.4 (H) 0.8 - 1.3 mg/dL    Est, Glom Filt Rate 24 (A) >60    Calcium 8.4 8.3 - 10.6 mg/dL    Total Protein 6.4 6.4 - 8.2 g/dL    Albumin 2.9 (L) 3.4 - 5.0 g/dL    Albumin/Globulin Ratio 0.8 (L) 1.1 - 2.2    Total Bilirubin 0.7 0.0 - 1.0 mg/dL    Alkaline Phosphatase 91 40 - 129 U/L    ALT 16 10 - 40 U/L    AST 24 15 - 37 U/L   Urinalysis with Reflex to Culture    Specimen: Urine   Result Value Ref Range    Color, UA GREEN (A) Straw/Yellow    Clarity, UA TURBID (A) Clear    Glucose, Ur Negative Negative mg/dL    Bilirubin, Urine Negative Negative    Ketones, Urine Negative Negative mg/dL    Specific Gravity, UA 1.020 1.005 - 1.030    Blood, Urine LARGE (A) Negative    pH, Urine 6.0 5.0 - 8.0    Protein,  (A) Negative mg/dL    Urobilinogen, Urine 0.2 <2.0 E.U./dL    Nitrite, Urine Negative Negative    Leukocyte Esterase, Urine LARGE (A) Negative    Microscopic Examination YES     Urine Type NotGiven     Urine Reflex to Culture Yes    Microscopic Urinalysis   Result Value Ref Range    WBC, UA >100 (A) 0 - 5 /HPF    RBC, UA see below (A) 0 - 4 /HPF    Bacteria, UA 4+ (A) None Seen /HPF   Lactate, Sepsis   Result Value Ref Range    Lactic Acid, Sepsis 1.0 0.4 - 1.9 mmol/L   Blood Occult Stool Screen #1   Result Value Ref Range    Occult Blood Screening Result: Negative  Normal range: Negative            Procedures  Procedures      Patient seen and evaluated.  Relevant records reviewed.  - Patient is 89 y.o. male presented for acute concern for abnormal labs outpatient in setting or chronic conditions as noted above.  - Exam showed elderly male who is in no apparent distress his vitals are

## 2024-11-22 LAB
ANION GAP SERPL CALCULATED.3IONS-SCNC: 10 MMOL/L (ref 3–16)
BACTERIA UR CULT: ABNORMAL
BASOPHILS # BLD: 0 K/UL (ref 0–0.2)
BASOPHILS NFR BLD: 0.4 %
BUN SERPL-MCNC: 59 MG/DL (ref 7–20)
CALCIUM SERPL-MCNC: 8 MG/DL (ref 8.3–10.6)
CHLORIDE SERPL-SCNC: 112 MMOL/L (ref 99–110)
CO2 SERPL-SCNC: 21 MMOL/L (ref 21–32)
CREAT SERPL-MCNC: 2.1 MG/DL (ref 0.8–1.3)
DEPRECATED RDW RBC AUTO: 15.4 % (ref 12.4–15.4)
EOSINOPHIL # BLD: 0.1 K/UL (ref 0–0.6)
EOSINOPHIL NFR BLD: 1.7 %
GFR SERPLBLD CREATININE-BSD FMLA CKD-EPI: 28 ML/MIN/{1.73_M2}
GLUCOSE SERPL-MCNC: 107 MG/DL (ref 70–99)
HCT VFR BLD AUTO: 23.5 % (ref 40.5–52.5)
HCT VFR BLD AUTO: 24.2 % (ref 40.5–52.5)
HGB BLD-MCNC: 7.5 G/DL (ref 13.5–17.5)
HGB BLD-MCNC: 7.9 G/DL (ref 13.5–17.5)
LYMPHOCYTES # BLD: 2.3 K/UL (ref 1–5.1)
LYMPHOCYTES NFR BLD: 32.9 %
MAGNESIUM SERPL-MCNC: 1.78 MG/DL (ref 1.8–2.4)
MCH RBC QN AUTO: 31.9 PG (ref 26–34)
MCHC RBC AUTO-ENTMCNC: 32.8 G/DL (ref 31–36)
MCV RBC AUTO: 97.2 FL (ref 80–100)
MONOCYTES # BLD: 0.5 K/UL (ref 0–1.3)
MONOCYTES NFR BLD: 7.1 %
NEUTROPHILS # BLD: 4 K/UL (ref 1.7–7.7)
NEUTROPHILS NFR BLD: 57.9 %
ORGANISM: ABNORMAL
PLATELET # BLD AUTO: 166 K/UL (ref 135–450)
PMV BLD AUTO: 8.9 FL (ref 5–10.5)
POTASSIUM SERPL-SCNC: 3.5 MMOL/L (ref 3.5–5.1)
RBC # BLD AUTO: 2.49 M/UL (ref 4.2–5.9)
SODIUM SERPL-SCNC: 143 MMOL/L (ref 136–145)
WBC # BLD AUTO: 6.9 K/UL (ref 4–11)

## 2024-11-22 PROCEDURE — 80048 BASIC METABOLIC PNL TOTAL CA: CPT

## 2024-11-22 PROCEDURE — 6360000002 HC RX W HCPCS: Performed by: STUDENT IN AN ORGANIZED HEALTH CARE EDUCATION/TRAINING PROGRAM

## 2024-11-22 PROCEDURE — 1200000000 HC SEMI PRIVATE

## 2024-11-22 PROCEDURE — 6370000000 HC RX 637 (ALT 250 FOR IP): Performed by: STUDENT IN AN ORGANIZED HEALTH CARE EDUCATION/TRAINING PROGRAM

## 2024-11-22 PROCEDURE — 6370000000 HC RX 637 (ALT 250 FOR IP): Performed by: INTERNAL MEDICINE

## 2024-11-22 PROCEDURE — 97530 THERAPEUTIC ACTIVITIES: CPT

## 2024-11-22 PROCEDURE — 36415 COLL VENOUS BLD VENIPUNCTURE: CPT

## 2024-11-22 PROCEDURE — 6360000002 HC RX W HCPCS: Performed by: INTERNAL MEDICINE

## 2024-11-22 PROCEDURE — 85025 COMPLETE CBC W/AUTO DIFF WBC: CPT

## 2024-11-22 PROCEDURE — 2580000003 HC RX 258: Performed by: INTERNAL MEDICINE

## 2024-11-22 PROCEDURE — 97166 OT EVAL MOD COMPLEX 45 MIN: CPT

## 2024-11-22 PROCEDURE — 85014 HEMATOCRIT: CPT

## 2024-11-22 PROCEDURE — 83735 ASSAY OF MAGNESIUM: CPT

## 2024-11-22 PROCEDURE — 97162 PT EVAL MOD COMPLEX 30 MIN: CPT

## 2024-11-22 PROCEDURE — 85018 HEMOGLOBIN: CPT

## 2024-11-22 PROCEDURE — 97116 GAIT TRAINING THERAPY: CPT

## 2024-11-22 RX ORDER — MAGNESIUM SULFATE IN WATER 40 MG/ML
4000 INJECTION, SOLUTION INTRAVENOUS ONCE
Status: COMPLETED | OUTPATIENT
Start: 2024-11-22 | End: 2024-11-22

## 2024-11-22 RX ORDER — ATORVASTATIN CALCIUM 40 MG/1
40 TABLET, FILM COATED ORAL DAILY
Status: DISCONTINUED | OUTPATIENT
Start: 2024-11-22 | End: 2024-11-29 | Stop reason: HOSPADM

## 2024-11-22 RX ADMIN — HYDRALAZINE HYDROCHLORIDE 100 MG: 50 TABLET ORAL at 10:25

## 2024-11-22 RX ADMIN — MAGNESIUM SULFATE HEPTAHYDRATE 4000 MG: 40 INJECTION, SOLUTION INTRAVENOUS at 10:37

## 2024-11-22 RX ADMIN — HYDRALAZINE HYDROCHLORIDE 100 MG: 50 TABLET ORAL at 20:56

## 2024-11-22 RX ADMIN — TAMSULOSIN HYDROCHLORIDE 0.8 MG: 0.4 CAPSULE ORAL at 10:25

## 2024-11-22 RX ADMIN — HYDRALAZINE HYDROCHLORIDE 100 MG: 50 TABLET ORAL at 14:58

## 2024-11-22 RX ADMIN — WATER 1000 MG: 1 INJECTION INTRAMUSCULAR; INTRAVENOUS; SUBCUTANEOUS at 17:22

## 2024-11-22 RX ADMIN — ATORVASTATIN CALCIUM 40 MG: 40 TABLET, FILM COATED ORAL at 20:57

## 2024-11-22 RX ADMIN — SODIUM CHLORIDE, PRESERVATIVE FREE 10 ML: 5 INJECTION INTRAVENOUS at 10:37

## 2024-11-22 ASSESSMENT — PAIN SCALES - GENERAL
PAINLEVEL_OUTOF10: 0
PAINLEVEL_OUTOF10: 0
PAINLEVEL_OUTOF10: 4

## 2024-11-22 ASSESSMENT — PAIN DESCRIPTION - DESCRIPTORS: DESCRIPTORS: ACHING

## 2024-11-22 ASSESSMENT — PAIN DESCRIPTION - LOCATION: LOCATION: PENIS

## 2024-11-22 ASSESSMENT — PAIN DESCRIPTION - ORIENTATION: ORIENTATION: DISTAL

## 2024-11-22 NOTE — H&P
Hospital Medicine History & Physical      Date of Admission: 11/21/2024    Date of Service:  Pt seen/examined on 11/21/2024    [x]Admitted to Inpatient with expected LOS greater than two midnights due to medical therapy.  []Placed in Observation status.    Chief Admission Complaint: Alteration in mental status    Presenting Admission History:      89 y.o. male who presented to [facility] from the nursing home with complaints of mental status changes that started yesterday.  There was also report of poor oral intake.  Today, patient developed hematuria.  Patient's himself cannot give history.  Keeps asking\" what do I do now\".  Since being admitted, patient has had very limited urine output.  He denies burning micturition.  Denies any urinary frequency.  Patient denies any chest pain.  Denies any shortness of breath.  Denies any nausea or vomiting.    In the emergency room, patient was noted to have increased creatinine at 2.4 from 1.4 about a month and a half ago, evidence of urinary tract infection and a hemoglobin of 8.6 from 10.6 about a month and a half ago    CT scan done in the emergency room showed evidence of bilateral hydroureteronephrosis, with no obstructive calculi.  There was diffuse circumferential urinary bladder wall thickening consistent with cystitis.  There were also findings consistent with hemorrhagic cysts     PMHx significant for atrial fibrillation, essential hypertension, chronic kidney disease, dyslipidemia.      Assessment/Plan:      Current Principal Problem:  UTI (urinary tract infection)    Urinary tract infection: IV Rocephin.  Follow urine cultures for sensitivity pattern    Bilateral hydronephrosis: Likely related to obstructive uropathy.  Place Winslow catheter.  Will consult urology    AURELIO on CKD: Like related to above.  IV fluids normal saline 100 cc an hour.  Hold all nephrotoxic agents.  Reevaluate renal function in the morning.    Hemorrhagic cysts, may be secondary to

## 2024-11-22 NOTE — CARE COORDINATION
Case Management Assessment  Initial Evaluation    Date/Time of Evaluation: 11/22/2024 1:11 PM  Assessment Completed by: GERMAINE Villarreal    If patient is discharged prior to next notation, then this note serves as note for discharge by case management.    Patient Name: Javier De La Torre                   YOB: 1929  Diagnosis: UTI (urinary tract infection) [N39.0]  Gross hematuria [R31.0]  AURELIO (acute kidney injury) (HCC) [N17.9]  Acute cystitis with hematuria [N30.01]  Hydronephrosis, unspecified hydronephrosis type [N13.30]  Atrial fibrillation, unspecified type (HCC) [I48.91]                   Date / Time: 11/21/2024  2:46 PM    Patient Admission Status: Inpatient   Readmission Risk (Low < 19, Mod (19-27), High > 27): Readmission Risk Score: 18.8    Current PCP: Keyla Juan  PCP verified by CM? Yes    Chart Reviewed: Yes      History Provided by: Patient  Patient Orientation: Alert and Oriented, Person, Self    Patient Cognition: Dementia / Early Alzheimer's    Hospitalization in the last 30 days (Readmission):  No      Advance Directives:      Code Status: Full Code   Patient's Primary Decision Maker is: Legal Next of Kin    Primary Decision Maker: Mariel Juan - Child - [phone]    Discharge Planning:    Patient lives with: Children Type of Home: Skilled Nursing Facility, Long-Term Care  Primary Care Giver: Self  Patient Support Systems include: Children   Current Financial resources: Medicare  Current community resources: None  Current services prior to admission: Extended Care Placement  Type of Home Care services:  Aide Services, Nursing Services    ADLS  Prior functional level: Assistance with the following:, Bathing, Dressing, Toileting, Cooking, Housework, Shopping, Mobility  Current functional level: Assistance with the following:, Bathing, Dressing, Toileting, Cooking, Housework, Shopping, Mobility    Family can provide assistance at DC: Yes  Would you like Case Management to

## 2024-11-22 NOTE — CONSULTS
IntraVENous, Q24H    Vitals:  /76   Pulse (!) 105   Temp 97.6 °F (36.4 °C)   Resp 16   Ht 1.803 m (5' 11\")   Wt 60.6 kg (133 lb 8 oz)   SpO2 96%   BMI 18.62 kg/m²     Intake/Output Summary (Last 24 hours) at 11/22/2024 0947  Last data filed at 11/22/2024 0641  Gross per 24 hour   Intake --   Output 1200 ml   Net -1200 ml       Physical Exam:  General Appearance: Alert and forgetful, cooperative, no distress, appears stated age  Head: Normocephalic, without obvious abnormality, atraumatic  Back: no CVA tenderness  Lungs: respirations unlabored, no wheezing  Abdomen: Soft, non-tender, non-distended, no masses  Skin: Skin color, texture, turgor normal, no rashes or lesions   Male :  Winslow catheter intact with hematuria    Labs:  CBC   Lab Results   Component Value Date/Time    WBC 6.9 11/22/2024 04:24 AM    RBC 2.49 11/22/2024 04:24 AM    HGB 7.9 11/22/2024 04:24 AM    HCT 24.2 11/22/2024 04:24 AM    MCV 97.2 11/22/2024 04:24 AM    MCH 31.9 11/22/2024 04:24 AM    MCHC 32.8 11/22/2024 04:24 AM    RDW 15.4 11/22/2024 04:24 AM     11/22/2024 04:24 AM    MPV 8.9 11/22/2024 04:24 AM     BMP   Lab Results   Component Value Date/Time     11/22/2024 04:24 AM    K 3.5 11/22/2024 04:24 AM     11/22/2024 04:24 AM    CO2 21 11/22/2024 04:24 AM    BUN 59 11/22/2024 04:24 AM    CREATININE 2.1 11/22/2024 04:24 AM    GLUCOSE 107 11/22/2024 04:24 AM    CALCIUM 8.0 11/22/2024 04:24 AM       Urinalysis:   Lab Results   Component Value Date/Time    COLORU GREEN 11/21/2024 03:59 PM    GLUCOSEU Negative 11/21/2024 03:59 PM    BLOODU LARGE 11/21/2024 03:59 PM    NITRU Negative 11/21/2024 03:59 PM    LEUKOCYTESUR LARGE 11/21/2024 03:59 PM       Imaging:    CT abdomen/pelvis 11/21/24  IMPRESSION  1. Bilateral hydroureteronephrosis. No obstructive calculi are seen.  2. Diffuse circumferential urinary bladder wall thickening. Correlate with  urinalysis.  3. Multiple findings in the renal cortices bilaterally  having different  densities and measuring up to 1.8 cm.  Some of these findings are probably  hemorrhagic cysts. Recommend attention on contrast enhanced imaging.  4. Mild right-sided pleural effusion.  5. Scattered left-sided colonic diverticulosis.    Impression/Plan:   - 89y.o. male with urinary retention, distended bladder, bilateral hydroureteronephrosis, AURELIO.  - I suspect he may have overflow incontinence but he is a poor historian that it is hard to get baseline voiding symptoms from him.   - Recommend leaving indwelling catheter in while his creatinine trends downward and as we treat his UTI. Voiding trial prior to discharge.   - Urine culture is pending, continue antibiotics and tailor to sensitivity.   - Urology will follow peripherally over the weekend. Call with questions.    GEENA Omalley - CNP 11/22/20249:47 AM

## 2024-11-22 NOTE — PROGRESS NOTES
Pt Aox4 VSS pt admitted with ferrell in place to room 0322. Pt oriented  to room call light in reach bed alarm.

## 2024-11-22 NOTE — PROGRESS NOTES
Occupational Therapy  Facility/Department: [facility] C3 TELE/MED SURG/ONC  Occupational Therapy Initial Assessment and Treatment    Name: Javier D eLa Torre  : 1929  MRN: [id number]  Date of Service: 2024    Discharge Recommendations:  Subacute/Skilled Nursing Facility  OT Equipment Recommendations  Equipment Needed: No  Other: Defer     Therapy discharge recommendations are subject to collaboration from the patient’s interdisciplinary healthcare team, including MD and case management recommendations.    Barriers to Home Discharge:   [] Steps to access home entry or bed/bath:   [x] Unable to transfer, ambulate, or propel wheelchair household distances without assist   [] Limited available assist at home upon discharge    [] Patient or family requests d/c to post-acute facility    [x] Poor cognition/safety awareness for d/c to home alone    [] Unable to maintain ordered weight bearing status    [] Patient with salient signs of long-standing immobility   [x] Decreased independence with ADLs   [x] Increased risk for falls   [] Other:    If pt is unable to be seen after this session, please let this note serve as discharge summary.  Please see case management note for discharge disposition.  Thank you.    Patient Diagnosis(es): The primary encounter diagnosis was Acute cystitis with hematuria. Diagnoses of Hydronephrosis, unspecified hydronephrosis type, Gross hematuria, AURELIO (acute kidney injury) (HCC), and Atrial fibrillation, unspecified type (HCC) were also pertinent to this visit.  Past Medical History:  has a past medical history of Hyperlipidemia, Hypertension, and Urinary retention.  Past Surgical History:  has a past surgical history that includes Cardiac surgery (2016).           Assessment  Performance deficits / Impairments: Decreased functional mobility ;Decreased endurance;Decreased ADL status;Decreased posture;Decreased balance;Decreased strength;Decreased safe awareness;Decreased  Comments: Pt educated on importance of OOB mobility, prevention of complications of bedrest, and general safety during hospitalization  Education Method: Demonstration;Verbal  Barriers to Learning: Cognition;Hearing  Education Outcome: Verbalized understanding;Unable to demonstrate understanding;Continued education needed  LUE AROM (degrees)  LUE AROM : WFL  Left Hand AROM (degrees)  Left Hand AROM: WFL  RUE AROM (degrees)  RUE AROM : WFL  Right Hand AROM (degrees)  Right Hand AROM: WFL              AM-PAC - ADL  AM-PAC Daily Activity - Inpatient   How much help is needed for putting on and taking off regular lower body clothing?: Total  How much help is needed for bathing (which includes washing, rinsing, drying)?: A Lot  How much help is needed for toileting (which includes using toilet, bedpan, or urinal)?: Total  How much help is needed for putting on and taking off regular upper body clothing?: A Lot  How much help is needed for taking care of personal grooming?: A Lot  How much help for eating meals?: A Little  AM-[facility] Inpatient Daily Activity Raw Score: 11  AM-PAC Inpatient ADL T-Scale Score : 29.04  ADL Inpatient CMS 0-100% Score: 70.42  ADL Inpatient CMS G-Code Modifier : CL    Goals  Short Term Goals  Time Frame for Short Term Goals: Short term goals to be met by 11/29 unless otherwise specified  Short Term Goal 1: Pt will perform toileting tasks w/ mod A and LRAD by 11/274  Short Term Goal 2: Pt will perform LB dressng w/ mod A and LRAD  Short Term Goal 3: Pt will tolerate 8 min stance at RW to perform ADL/IADL task w/ min A and only 1 VC for sequencing  Short Term Goal 4: Pt will tolerate x20 BUE ther ex  Patient Goals   Patient goals : not stated      Therapy Time   Individual Concurrent Group Co-treatment   Time In       0950   Time Out       1027   Minutes       37   Timed Code Treatment Minutes: 27 Minutes (+10 min OT junior)       Ayesha Reynolds OT

## 2024-11-22 NOTE — PROGRESS NOTES
Physical Therapy  Facility/Department: [facility] C3 TELE/MED SURG/ONC  Physical Therapy Initial Assessment/Treatment     Name: Javier De La Torre  : 1929  MRN: [id number]  Date of Service: 2024    Discharge Recommendations:  Subacute/Skilled Nursing Facility   PT Equipment Recommendations  Equipment Needed: No      Patient Diagnosis(es): The primary encounter diagnosis was Acute cystitis with hematuria. Diagnoses of Hydronephrosis, unspecified hydronephrosis type, Gross hematuria, AURELIO (acute kidney injury) (HCC), and Atrial fibrillation, unspecified type (HCC) were also pertinent to this visit.  Past Medical History:  has a past medical history of Hyperlipidemia, Hypertension, and Urinary retention.  Past Surgical History:  has a past surgical history that includes Cardiac surgery (2016).    Therapy discharge recommendations are subject to collaboration from the patient’s interdisciplinary healthcare team, including MD and case management recommendations.    Barriers to Home Discharge:   [] Steps to access home entry or bed/bath:   [x] Unable to transfer, ambulate, or propel wheelchair household distances without assist   [] Limited available assist at home upon discharge    [] Patient or family requests d/c to post-acute facility    [x] Poor cognition/safety awareness for d/c to home alone    [] Unable to maintain ordered weight bearing status    [] Patient with salient signs of long-standing immobility   [x] Decreased independence with ADLs   [x] Increased risk for falls   [] Other:    If pt is unable to be seen after this session, please let this note serve as discharge summary.  Please see case management note for discharge disposition.  Thank you.    Assessment  Body Structures, Functions, Activity Limitations Requiring Skilled Therapeutic Intervention: Decreased endurance;Decreased functional mobility ;Decreased posture;Decreased safe awareness;Decreased strength;Decreased balance;Decreased ROM;Decreased  time  Stand to Sit: Contact-guard assistance;Additional time  Gait  Gait Training: Yes  Overall Level of Assistance: Minimum assistance;Additional time  Distance (ft): 150 Feet (150+150)  Assistive Device: Walker, rolling;Gait belt  Interventions: Safety awareness training;Verbal cues;Visual cues  Base of Support: Widened  Step Length: Right shortened;Left shortened  Gait Abnormalities: Decreased step clearance;Trunk sway increased (Pt is grossly unsteady but demos no overt LOB. Ambulates with wide YODIT with increased sway and requires HHA for support.)       OutComes Score     AM-PAC - Mobility    AM-PAC Basic Mobility - Inpatient   How much help is needed turning from your back to your side while in a flat bed without using bedrails?: A Little  How much help is needed moving from lying on your back to sitting on the side of a flat bed without using bedrails?: A Little  How much help is needed moving to and from a bed to a chair?: A Little  How much help is needed standing up from a chair using your arms?: A Little  How much help is needed walking in hospital room?: A Little  How much help is needed climbing 3-5 steps with a railing?: A Lot  AM-PAC Inpatient Mobility Raw Score : 17  AM-PAC Inpatient T-Scale Score : 42.13  Mobility Inpatient CMS 0-100% Score: 50.57  Mobility Inpatient CMS G-Code Modifier : CK    Goals  Short Term Goals  Time Frame for Short Term Goals: 11/29/24 (7 days) unless otherwise stated  Short Term Goal 1: Pt will perform supine <> sit with mod I  Short Term Goal 2: Pt will perform all transfers with LRAD and supervision  Short Term Goal 3: Pt will ambulate 150ft with LRAD and SBA  Short Term Goal 4: Pt will perform 10 reps of BLE exercises by 11/25  Patient Goals   Patient Goals : \"to go home\"       Education  Patient Education  Education Given To: Patient  Education Provided: Role of Therapy;Plan of Care;Transfer Training  Education Provided Comments: Disease Specific Education: Pt educated

## 2024-11-22 NOTE — PROGRESS NOTES
..4 Eyes Skin Assessment     NAME:  Javier De La Torre  YOB: 1929  MEDICAL RECORD NUMBER:  [id number]    The patient is being assessed for  Admission    I agree that at least one RN has performed a thorough Head to Toe Skin Assessment on the patient. ALL assessment sites listed below have been assessed.      Areas assessed by both nurses:    Head, Face, Ears        Does the Patient have a Wound? No noted wound(s)       [place] Prevention initiated by RN: No  Wound Care Orders initiated by RN: No    Pressure Injury (Stage 3,4, Unstageable, DTI, NWPT, and Complex wounds) if present, place Wound referral order by RN under : No    New Ostomies, if present place, Ostomy referral order under : No     Nurse 1 eSignature: Electronically signed by PHILLY BLAIR RN on 11/22/24 at 6:28 AM EST    **SHARE this note so that the co-signing nurse can place an eSignature**    Nurse 2 eSignature: Electronically signed by Rochelle Lpoez RN on 11/22/24 at 6:56 AM EST

## 2024-11-22 NOTE — ED NOTES
Javier De La Torre is a 89 y.o. male admitted for  Principal Problem:    UTI (urinary tract infection)  Resolved Problems:    * No resolved hospital problems. *  .   Patient SNF Rehab via EMS transportation with   Chief Complaint   Patient presents with    Abnormal Lab    Hematuria     Per EMS report initial call made for an increase in confusion. They report upon arrival pt GCS 15 and report abnormal labs. Nursing home report stated pt has had a decrease in PO intake and has some hematuria. They report a creat at 2.3 and a BUN at 66.    .  Patient is alert and Person, Place, Time, and Situation  Patient's baseline mobility: Baseline Mobility: Walker  Code Status: Prior   Cardiac Rhythm:  a fib      Is patient on baseline Oxygen: no how many Liters   :   Abnormal Assessment Findings: none     Isolation: None      NIH Score:    C-SSRS: Risk of Suicide: No Risk  Bedside swallow:        Active LDA's:   Peripheral IV 11/21/24 Left Antecubital (Active)   Site Assessment Clean, dry & intact 11/21/24 1558   Line Status Blood return noted 11/21/24 1558     Patient admitted with a ferrell: yes,  If the ferrell is chronic was it exchanged:Yes  Reason for ferrell: Urinary obstruction  Patient admitted with Central Line:  NA . PICC line placement confirmed: YES OR NO:[id number]}   Reason for Central line: Antibiotic use >10 days  Was central line Inserted from an outside facility: no       Family/Caregiver Present no Any Concerns: no   Restraints no  Sitter no         Vitals: MEWS Score: 1    Vitals:    11/21/24 1452 11/21/24 1611 11/21/24 1722 11/21/24 1800   BP: (!) 123/59 (!) 137/56 (!) 157/85 (!) 160/96   Pulse: 99 94 86 87   Resp: 20 18 14 22   Temp: 98.1 °F (36.7 °C)      TempSrc: Oral      SpO2: 100% 98% 98% 100%   Weight: 60.3 kg (133 lb)          Last documented pain score (0-10 scale) Pain Level: 0  Pain medication administered No.    Pertinent or High Risk Medications/Drips: No.    Pending Blood Product Administration:

## 2024-11-22 NOTE — PROGRESS NOTES
[facility] Medicine Progress Note  V 10.25      Date of Admission: 11/21/2024    Hospital Day: 2      Chief Admission Complaint: Altered mental status    Subjective: Patient denies any new symptoms however reports Winslow is slightly uncomfortable.  He is a poor historian and repeats himself a lot.    Presenting Admission History:       89 y.o. male who presented to [facility] from the nursing home with complaints of mental status changes that started yesterday.  There was also report of poor oral intake.  Today, patient developed hematuria.  Patient's himself cannot give history.  Keeps asking\" what do I do now\".  Since being admitted, patient has had very limited urine output.  He denies burning micturition.  Denies any urinary frequency.  Patient denies any chest pain.  Denies any shortness of breath.  Denies any nausea or vomiting.     In the emergency room, patient was noted to have increased creatinine at 2.4 from 1.4 about a month and a half ago, evidence of urinary tract infection and a hemoglobin of 8.6 from 10.6 about a month and a half ago     CT scan done in the emergency room showed evidence of bilateral hydroureteronephrosis, with no obstructive calculi.  There was diffuse circumferential urinary bladder wall thickening consistent with cystitis.  There were also findings consistent with hemorrhagic cysts      PMHx significant for atrial fibrillation, essential hypertension, chronic kidney disease, dyslipidemia.    Assessment/Plan:      Current Principal Problem:  UTI (urinary tract infection)    Obstructive uropathy  AURELIO due to problem #1  UTI  Metabolic encephalopathy  Gross hematuria  Acute on chronic normocytic anemia  Hydronephrosis  Multiple hemorrhagic cysts in the kidney  Essential hypertension  BPH  Hypomagnesemia      Plan    Renal functions improved however still above baseline after Winslow placement.  Urology consulted, reviewed their note on 11/22 and noted their plan to continue to keep  Medications (IV Amiodarone/Diltiazem, Tikosyn, etc)    [] Hemodialysis    [] Other -    [] Change in code status    [] Decision to escalate care    [] Major surgery/procedure with associated risk factors    --------------------------------------------------  C. Data (any 2)    [x] Data Review (any 3)    [x] Consultant notes from yesterday/today urology  [x] All available current labs reviewed interpreted for clinical significance    [x] Appropriate follow-up labs were ordered  [] Collateral history obtained     [x] Independent Interpretation of tests (any 1)    [x] Telemetry (Rhythm Strip) personally reviewed and interpreted        [] Imaging personally reviewed and interpreted     [x] Discussion (any 1)  [x] Multi-Disciplinary Rounds with Case Management  [] Discussed management of the case with           Labs:  Personally reviewed on 11/22/2024 and interpreted for clinical significance as documented above.     Recent Labs     11/21/24  1507 11/22/24  0424   WBC 8.7 6.9   HGB 8.6* 7.9*   HCT 26.5* 24.2*    166     Recent Labs     11/21/24  1507 11/22/24  0424    143   K 4.0 3.5    112*   CO2 23 21   BUN 64* 59*   CREATININE 2.4* 2.1*   CALCIUM 8.4 8.0*   MG  --  1.78*     No results for input(s): \"PROBNP\", \"TROPHS\" in the last 72 hours.  No results for input(s): \"LABA1C\" in the last 72 hours.  Recent Labs     11/21/24  1507   AST 24   ALT 16   BILITOT 0.7   ALKPHOS 91     No results for input(s): \"INR\", \"LACTA\", \"TSH\" in the last 72 hours.    Urine Cultures: No results found for: \"LABURIN\"  Blood Cultures: No results found for: \"BC\"  No results found for: \"BLOODCULT2\"  Organism: No results found for: \"ORG\"      Payam Munson DO

## 2024-11-23 LAB
ANION GAP SERPL CALCULATED.3IONS-SCNC: 12 MMOL/L (ref 3–16)
BASOPHILS # BLD: 0 K/UL (ref 0–0.2)
BASOPHILS NFR BLD: 0.7 %
BUN SERPL-MCNC: 51 MG/DL (ref 7–20)
CALCIUM SERPL-MCNC: 7.9 MG/DL (ref 8.3–10.6)
CHLORIDE SERPL-SCNC: 112 MMOL/L (ref 99–110)
CO2 SERPL-SCNC: 16 MMOL/L (ref 21–32)
CREAT SERPL-MCNC: 2 MG/DL (ref 0.8–1.3)
DEPRECATED RDW RBC AUTO: 16.3 % (ref 12.4–15.4)
EKG DIAGNOSIS: NORMAL
EKG DIAGNOSIS: NORMAL
EKG Q-T INTERVAL: 416 MS
EKG Q-T INTERVAL: 422 MS
EKG QRS DURATION: 140 MS
EKG QRS DURATION: 150 MS
EKG QTC CALCULATION (BAZETT): 506 MS
EKG QTC CALCULATION (BAZETT): 513 MS
EKG R AXIS: 87 DEGREES
EKG R AXIS: 92 DEGREES
EKG T AXIS: -19 DEGREES
EKG T AXIS: 0 DEGREES
EKG VENTRICULAR RATE: 89 BPM
EKG VENTRICULAR RATE: 89 BPM
EOSINOPHIL # BLD: 0.1 K/UL (ref 0–0.6)
EOSINOPHIL NFR BLD: 1.9 %
GFR SERPLBLD CREATININE-BSD FMLA CKD-EPI: 30 ML/MIN/{1.73_M2}
GLUCOSE SERPL-MCNC: 109 MG/DL (ref 70–99)
HCT VFR BLD AUTO: 27.3 % (ref 40.5–52.5)
HGB BLD-MCNC: 8.4 G/DL (ref 13.5–17.5)
LYMPHOCYTES # BLD: 2.6 K/UL (ref 1–5.1)
LYMPHOCYTES NFR BLD: 38.8 %
MCH RBC QN AUTO: 31.6 PG (ref 26–34)
MCHC RBC AUTO-ENTMCNC: 30.6 G/DL (ref 31–36)
MCV RBC AUTO: 103.3 FL (ref 80–100)
MONOCYTES # BLD: 0.5 K/UL (ref 0–1.3)
MONOCYTES NFR BLD: 7.4 %
NEUTROPHILS # BLD: 3.4 K/UL (ref 1.7–7.7)
NEUTROPHILS NFR BLD: 51.2 %
PLATELET # BLD AUTO: 162 K/UL (ref 135–450)
PMV BLD AUTO: 8.2 FL (ref 5–10.5)
POTASSIUM SERPL-SCNC: 3.9 MMOL/L (ref 3.5–5.1)
RBC # BLD AUTO: 2.64 M/UL (ref 4.2–5.9)
SODIUM SERPL-SCNC: 140 MMOL/L (ref 136–145)
WBC # BLD AUTO: 6.7 K/UL (ref 4–11)

## 2024-11-23 PROCEDURE — 80048 BASIC METABOLIC PNL TOTAL CA: CPT

## 2024-11-23 PROCEDURE — 93010 ELECTROCARDIOGRAM REPORT: CPT | Performed by: INTERNAL MEDICINE

## 2024-11-23 PROCEDURE — 6370000000 HC RX 637 (ALT 250 FOR IP): Performed by: STUDENT IN AN ORGANIZED HEALTH CARE EDUCATION/TRAINING PROGRAM

## 2024-11-23 PROCEDURE — 1200000000 HC SEMI PRIVATE

## 2024-11-23 PROCEDURE — 2580000003 HC RX 258: Performed by: STUDENT IN AN ORGANIZED HEALTH CARE EDUCATION/TRAINING PROGRAM

## 2024-11-23 PROCEDURE — 6370000000 HC RX 637 (ALT 250 FOR IP): Performed by: INTERNAL MEDICINE

## 2024-11-23 PROCEDURE — 6360000002 HC RX W HCPCS: Performed by: STUDENT IN AN ORGANIZED HEALTH CARE EDUCATION/TRAINING PROGRAM

## 2024-11-23 PROCEDURE — 2580000003 HC RX 258: Performed by: INTERNAL MEDICINE

## 2024-11-23 PROCEDURE — 85025 COMPLETE CBC W/AUTO DIFF WBC: CPT

## 2024-11-23 PROCEDURE — 36415 COLL VENOUS BLD VENIPUNCTURE: CPT

## 2024-11-23 PROCEDURE — 93005 ELECTROCARDIOGRAM TRACING: CPT | Performed by: STUDENT IN AN ORGANIZED HEALTH CARE EDUCATION/TRAINING PROGRAM

## 2024-11-23 PROCEDURE — 6360000002 HC RX W HCPCS: Performed by: INTERNAL MEDICINE

## 2024-11-23 RX ORDER — HEPARIN SODIUM 5000 [USP'U]/ML
5000 INJECTION, SOLUTION INTRAVENOUS; SUBCUTANEOUS EVERY 8 HOURS SCHEDULED
Status: DISCONTINUED | OUTPATIENT
Start: 2024-11-23 | End: 2024-11-29 | Stop reason: HOSPADM

## 2024-11-23 RX ORDER — 0.9 % SODIUM CHLORIDE 0.9 %
500 INTRAVENOUS SOLUTION INTRAVENOUS ONCE
Status: COMPLETED | OUTPATIENT
Start: 2024-11-23 | End: 2024-11-23

## 2024-11-23 RX ADMIN — HEPARIN SODIUM 5000 UNITS: 5000 INJECTION INTRAVENOUS; SUBCUTANEOUS at 14:08

## 2024-11-23 RX ADMIN — HEPARIN SODIUM 5000 UNITS: 5000 INJECTION INTRAVENOUS; SUBCUTANEOUS at 21:19

## 2024-11-23 RX ADMIN — SODIUM CHLORIDE, PRESERVATIVE FREE 10 ML: 5 INJECTION INTRAVENOUS at 09:01

## 2024-11-23 RX ADMIN — SODIUM CHLORIDE 500 ML: 9 INJECTION, SOLUTION INTRAVENOUS at 08:45

## 2024-11-23 RX ADMIN — SODIUM CHLORIDE, PRESERVATIVE FREE 10 ML: 5 INJECTION INTRAVENOUS at 20:50

## 2024-11-23 RX ADMIN — ATORVASTATIN CALCIUM 40 MG: 40 TABLET, FILM COATED ORAL at 20:50

## 2024-11-23 RX ADMIN — HEPARIN SODIUM 5000 UNITS: 5000 INJECTION INTRAVENOUS; SUBCUTANEOUS at 08:47

## 2024-11-23 RX ADMIN — WATER 1000 MG: 1 INJECTION INTRAMUSCULAR; INTRAVENOUS; SUBCUTANEOUS at 17:00

## 2024-11-23 RX ADMIN — POLYETHYLENE GLYCOL 3350 17 G: 17 POWDER, FOR SOLUTION ORAL at 17:00

## 2024-11-23 RX ADMIN — HYDRALAZINE HYDROCHLORIDE 100 MG: 50 TABLET ORAL at 20:50

## 2024-11-23 RX ADMIN — TAMSULOSIN HYDROCHLORIDE 0.8 MG: 0.4 CAPSULE ORAL at 08:47

## 2024-11-23 RX ADMIN — HYDRALAZINE HYDROCHLORIDE 100 MG: 50 TABLET ORAL at 14:09

## 2024-11-23 RX ADMIN — HYDRALAZINE HYDROCHLORIDE 100 MG: 50 TABLET ORAL at 08:47

## 2024-11-23 NOTE — PROGRESS NOTES
[facility] Medicine Progress Note  V 10.25      Date of Admission: 11/21/2024    Hospital Day: 3      Chief Admission Complaint: Altered mental status    Subjective: Patient denies any new symptoms.  This morning patient went into A-fib with RVR however RVR resolved after a bolus of IV fluid.  Patient asymptomatic this morning.    Presenting Admission History:       89 y.o. male who presented to [facility] from the nursing home with complaints of mental status changes that started yesterday.  There was also report of poor oral intake.  Today, patient developed hematuria.  Patient's himself cannot give history.  Keeps asking\" what do I do now\".  Since being admitted, patient has had very limited urine output.  He denies burning micturition.  Denies any urinary frequency.  Patient denies any chest pain.  Denies any shortness of breath.  Denies any nausea or vomiting.     In the emergency room, patient was noted to have increased creatinine at 2.4 from 1.4 about a month and a half ago, evidence of urinary tract infection and a hemoglobin of 8.6 from 10.6 about a month and a half ago     CT scan done in the emergency room showed evidence of bilateral hydroureteronephrosis, with no obstructive calculi.  There was diffuse circumferential urinary bladder wall thickening consistent with cystitis.  There were also findings consistent with hemorrhagic cysts      PMHx significant for atrial fibrillation, essential hypertension, chronic kidney disease, dyslipidemia.    Assessment/Plan:      Current Principal Problem:  UTI (urinary tract infection)    Obstructive uropathy  AURELIO on CKD 3B due to problem #1  Paroxysmal A-fib with RVR status post left atrial appendage closure not on anticoagulation  UTI  Metabolic encephalopathy  Gross hematuria, resolved  Acute on chronic normocytic anemia  Hydronephrosis  Multiple hemorrhagic cysts in the kidney  Essential hypertension  BPH  Hypomagnesemia      Plan    Renal functions improved

## 2024-11-23 NOTE — CONSULTS
Consult Call Back    Who:Chio Colon, APRN - CNP   Date:11/23/2024,  Time:7:25 AM    Electronically signed by Jigna Christine on 11/23/24 at 7:25 AM EST

## 2024-11-23 NOTE — PROGRESS NOTES
Aox4, bed in lowest position, bed alarm on, call light within reach, wrist band on. Sitting comfortably in bed.

## 2024-11-23 NOTE — PROGRESS NOTES
Pt A&O x2. There are moments where pt needs reorientation, especially to place and time. Pt verbalized experiencing 0 pain. Pt was given AM mx without complications. Patent I.V in left AC. Pt denied needs when writer left room.

## 2024-11-23 NOTE — PROGRESS NOTES
Yaw Arreguin injected IV Ceftriaxone as prescribed. Injected over 5min. Afterwards during NS flush RN noticed redness around I.V site. Pt stated no pain or burning at I.V site. YAW arreguin devaughn site to monitor sxs. Pt tolerated well.

## 2024-11-24 LAB
ANION GAP SERPL CALCULATED.3IONS-SCNC: 15 MMOL/L (ref 3–16)
BASOPHILS # BLD: 0 K/UL (ref 0–0.2)
BASOPHILS NFR BLD: 0.3 %
BUN SERPL-MCNC: 44 MG/DL (ref 7–20)
CALCIUM SERPL-MCNC: 8 MG/DL (ref 8.3–10.6)
CHLORIDE SERPL-SCNC: 107 MMOL/L (ref 99–110)
CO2 SERPL-SCNC: 17 MMOL/L (ref 21–32)
CREAT SERPL-MCNC: 2 MG/DL (ref 0.8–1.3)
DEPRECATED RDW RBC AUTO: 16.4 % (ref 12.4–15.4)
EOSINOPHIL # BLD: 0.1 K/UL (ref 0–0.6)
EOSINOPHIL NFR BLD: 2.4 %
GFR SERPLBLD CREATININE-BSD FMLA CKD-EPI: 30 ML/MIN/{1.73_M2}
GLUCOSE SERPL-MCNC: 161 MG/DL (ref 70–99)
HCT VFR BLD AUTO: 27 % (ref 40.5–52.5)
HGB BLD-MCNC: 8.4 G/DL (ref 13.5–17.5)
LYMPHOCYTES # BLD: 2 K/UL (ref 1–5.1)
LYMPHOCYTES NFR BLD: 35.8 %
MCH RBC QN AUTO: 31.3 PG (ref 26–34)
MCHC RBC AUTO-ENTMCNC: 30.9 G/DL (ref 31–36)
MCV RBC AUTO: 101.4 FL (ref 80–100)
MONOCYTES # BLD: 0.3 K/UL (ref 0–1.3)
MONOCYTES NFR BLD: 4.6 %
NEUTROPHILS # BLD: 3.2 K/UL (ref 1.7–7.7)
NEUTROPHILS NFR BLD: 56.9 %
PLATELET # BLD AUTO: 154 K/UL (ref 135–450)
PMV BLD AUTO: 8.1 FL (ref 5–10.5)
POTASSIUM SERPL-SCNC: 4.1 MMOL/L (ref 3.5–5.1)
RBC # BLD AUTO: 2.67 M/UL (ref 4.2–5.9)
SODIUM SERPL-SCNC: 139 MMOL/L (ref 136–145)
WBC # BLD AUTO: 5.6 K/UL (ref 4–11)

## 2024-11-24 PROCEDURE — 85025 COMPLETE CBC W/AUTO DIFF WBC: CPT

## 2024-11-24 PROCEDURE — 6370000000 HC RX 637 (ALT 250 FOR IP): Performed by: STUDENT IN AN ORGANIZED HEALTH CARE EDUCATION/TRAINING PROGRAM

## 2024-11-24 PROCEDURE — 2580000003 HC RX 258: Performed by: INTERNAL MEDICINE

## 2024-11-24 PROCEDURE — 80048 BASIC METABOLIC PNL TOTAL CA: CPT

## 2024-11-24 PROCEDURE — 6370000000 HC RX 637 (ALT 250 FOR IP): Performed by: INTERNAL MEDICINE

## 2024-11-24 PROCEDURE — 6360000002 HC RX W HCPCS: Performed by: INTERNAL MEDICINE

## 2024-11-24 PROCEDURE — 6360000002 HC RX W HCPCS: Performed by: STUDENT IN AN ORGANIZED HEALTH CARE EDUCATION/TRAINING PROGRAM

## 2024-11-24 PROCEDURE — 1200000000 HC SEMI PRIVATE

## 2024-11-24 PROCEDURE — 36415 COLL VENOUS BLD VENIPUNCTURE: CPT

## 2024-11-24 RX ADMIN — WATER 1000 MG: 1 INJECTION INTRAMUSCULAR; INTRAVENOUS; SUBCUTANEOUS at 16:36

## 2024-11-24 RX ADMIN — HYDRALAZINE HYDROCHLORIDE 100 MG: 50 TABLET ORAL at 09:04

## 2024-11-24 RX ADMIN — HEPARIN SODIUM 5000 UNITS: 5000 INJECTION INTRAVENOUS; SUBCUTANEOUS at 21:56

## 2024-11-24 RX ADMIN — HYDRALAZINE HYDROCHLORIDE 100 MG: 50 TABLET ORAL at 16:36

## 2024-11-24 RX ADMIN — SODIUM CHLORIDE, PRESERVATIVE FREE 10 ML: 5 INJECTION INTRAVENOUS at 19:36

## 2024-11-24 RX ADMIN — HEPARIN SODIUM 5000 UNITS: 5000 INJECTION INTRAVENOUS; SUBCUTANEOUS at 06:13

## 2024-11-24 RX ADMIN — HEPARIN SODIUM 5000 UNITS: 5000 INJECTION INTRAVENOUS; SUBCUTANEOUS at 16:36

## 2024-11-24 RX ADMIN — SODIUM CHLORIDE, PRESERVATIVE FREE 10 ML: 5 INJECTION INTRAVENOUS at 16:37

## 2024-11-24 RX ADMIN — ATORVASTATIN CALCIUM 40 MG: 40 TABLET, FILM COATED ORAL at 19:35

## 2024-11-24 RX ADMIN — HYDRALAZINE HYDROCHLORIDE 100 MG: 50 TABLET ORAL at 21:54

## 2024-11-24 RX ADMIN — TAMSULOSIN HYDROCHLORIDE 0.8 MG: 0.4 CAPSULE ORAL at 09:04

## 2024-11-24 NOTE — PROGRESS NOTES
4 Eyes Skin Assessment     The patient is being assess for  Shift Handoff    I agree that 2 RN's have performed a thorough Head to Toe Skin Assessment on the patient. ALL assessment sites listed below have been assessed.       Areas assessed by both nurses:   [x]   Head, Face, and Ears   [x]   Shoulders, Back, and Chest  [x]   Arms, Elbows, and Hands   [x]   Coccyx, Sacrum, and IschIum  [x]   Legs, Feet, and Heels        Does the Patient have Skin Breakdown?  No         Austin Prevention initiated:  Yes   Wound Care Orders initiated:  No      [facility] nurse consulted for Pressure Injury (Stage 3,4, Unstageable, DTI, NWPT, and Complex wounds), New and Established Ostomies:  No      Nurse 1 eSignature: Electronically signed by Robby Gray RN on 11/23/24 at 4:00 PM EST    **SHARE this note so that the co-signing nurse is able to place an eSignature**    Nurse 2 eSignature: Electronically signed by Rodríguez Ahmadi RN on 11/23/24 at 4:00 PM EST

## 2024-11-24 NOTE — PROGRESS NOTES
Awake on bed, alert and oriented x 3. Winslow catheter in place, draining to urine bag. Side rails up. Call light within reach. Will continue to monitor.

## 2024-11-24 NOTE — PROGRESS NOTES
4 Eyes Skin Assessment and Patient belongings     The patient is being assess for  Low Austin    I agree that 2 Nurses have performed a thorough Head to Toe Skin Assessment on the patient. ALL assessment sites listed below have been assessed.       Areas assessed by both nurses: Jenni VELASQUEZ, Robby RN  [x]   Head, Face, and Ears   [x]   Shoulders, Back, and Chest  [x]   Arms, Elbows, and Hands   [x]   Coccyx, Sacrum, and IschIum  [x]   Legs, Feet, and Heels    Ecchymosis scattered.  Mid and lower spine red and natalie. Foam dressing applied.   Coccyx red and blanches. Foam applied.   Bilateral heels with no redness. Floated on pillows.           Does the Patient have Skin Breakdown?  No         Austin Prevention initiated:  Yes   Wound Care Orders initiated:  NA      [facility] nurse consulted for Pressure Injury (Stage 3,4, Unstageable, DTI, NWPT, and Complex wounds), New and Established Ostomies:  NA      I agree that 2 Nurses have reviewed patient belongings with the patient/family and documented in the flowsheet upon admission or transfer to the unit.     Belongings  Dental Appliances: None  Vision - Corrective Lenses: None  Hearing Aid: None  Clothing: Shirt, Pants, Pajamas, Jacket/Coat, Footwear, Socks, Undergarments  Jewelry: None  Electronic Devices: None  Weapons (Notify Protective Services/Security): None  Home Medications: None  Valuables Given To: Patient  Provide Name(s) of Who Valuable(s) Were Given To: pt       Nurse 1 eSignature: Electronically signed by Jenni Rosenberg RN on 11/24/24 at 4:54 PM EST    **SHARE this note so that the co-signing nurse is able to place an eSignature**    Nurse 2 eSignature: {Esignature:[id number]}

## 2024-11-25 ENCOUNTER — APPOINTMENT (OUTPATIENT)
Dept: ULTRASOUND IMAGING | Age: 88
End: 2024-11-25
Payer: OTHER GOVERNMENT

## 2024-11-25 LAB
BACTERIA BLD CULT ORG #2: NORMAL
BACTERIA BLD CULT: NORMAL

## 2024-11-25 PROCEDURE — 2580000003 HC RX 258: Performed by: INTERNAL MEDICINE

## 2024-11-25 PROCEDURE — 97530 THERAPEUTIC ACTIVITIES: CPT

## 2024-11-25 PROCEDURE — 6370000000 HC RX 637 (ALT 250 FOR IP): Performed by: INTERNAL MEDICINE

## 2024-11-25 PROCEDURE — 6360000002 HC RX W HCPCS: Performed by: STUDENT IN AN ORGANIZED HEALTH CARE EDUCATION/TRAINING PROGRAM

## 2024-11-25 PROCEDURE — 1200000000 HC SEMI PRIVATE

## 2024-11-25 PROCEDURE — 97535 SELF CARE MNGMENT TRAINING: CPT

## 2024-11-25 PROCEDURE — 97110 THERAPEUTIC EXERCISES: CPT

## 2024-11-25 PROCEDURE — 6360000002 HC RX W HCPCS: Performed by: INTERNAL MEDICINE

## 2024-11-25 PROCEDURE — 6370000000 HC RX 637 (ALT 250 FOR IP): Performed by: STUDENT IN AN ORGANIZED HEALTH CARE EDUCATION/TRAINING PROGRAM

## 2024-11-25 PROCEDURE — 76770 US EXAM ABDO BACK WALL COMP: CPT

## 2024-11-25 RX ADMIN — ATORVASTATIN CALCIUM 40 MG: 40 TABLET, FILM COATED ORAL at 20:45

## 2024-11-25 RX ADMIN — HEPARIN SODIUM 5000 UNITS: 5000 INJECTION INTRAVENOUS; SUBCUTANEOUS at 06:07

## 2024-11-25 RX ADMIN — HYDRALAZINE HYDROCHLORIDE 100 MG: 50 TABLET ORAL at 09:02

## 2024-11-25 RX ADMIN — TAMSULOSIN HYDROCHLORIDE 0.8 MG: 0.4 CAPSULE ORAL at 09:02

## 2024-11-25 RX ADMIN — SODIUM CHLORIDE, PRESERVATIVE FREE 10 ML: 5 INJECTION INTRAVENOUS at 09:03

## 2024-11-25 RX ADMIN — HEPARIN SODIUM 5000 UNITS: 5000 INJECTION INTRAVENOUS; SUBCUTANEOUS at 23:08

## 2024-11-25 RX ADMIN — WATER 1000 MG: 1 INJECTION INTRAMUSCULAR; INTRAVENOUS; SUBCUTANEOUS at 16:12

## 2024-11-25 RX ADMIN — HEPARIN SODIUM 5000 UNITS: 5000 INJECTION INTRAVENOUS; SUBCUTANEOUS at 13:09

## 2024-11-25 RX ADMIN — SODIUM CHLORIDE, PRESERVATIVE FREE 10 ML: 5 INJECTION INTRAVENOUS at 20:44

## 2024-11-25 RX ADMIN — HYDRALAZINE HYDROCHLORIDE 100 MG: 50 TABLET ORAL at 13:09

## 2024-11-25 RX ADMIN — HYDRALAZINE HYDROCHLORIDE 100 MG: 50 TABLET ORAL at 20:45

## 2024-11-25 NOTE — PROGRESS NOTES
Pt Name: Javier De La Torre  Medical Record Number: [id number]  Date of Birth 11/27/1929   Today's Date: 11/25/2024      Subjective:  Awake in bed, getting sponge bath. No complaints.     ROS: Constitutional: No fever    Vitals:  Vitals:    11/24/24 2153 11/24/24 2330 11/25/24 0424 11/25/24 0800   BP: (!) 146/67 (!) 158/72 (!) 159/65    Pulse: 95 71 84 87   Resp:  16 16 17   Temp:  98 °F (36.7 °C) 97.9 °F (36.6 °C) 98.5 °F (36.9 °C)   TempSrc:  Oral Oral Oral   SpO2:  95% 97% 96%   Weight:   60.3 kg (132 lb 15 oz)    Height:         I/O last 3 completed shifts:  In: 730 [P.O.:720; I.V.:10]  Out: 2875 [Urine:2875]    Exam:  General: Awake, oriented, no acute distress  Respiratory: Nonlabored breathing  Abdomen: Soft, non-tender, non-distended, no masses  : ferrell catheter intact with yellow output  Skin: Skin color, texture, turgor normal, no rashes or lesions  Neurologic: no gross deficits    CURRENT MEDICATIONS   Scheduled Meds:   heparin (porcine)  5,000 Units SubCUTAneous 3 times per day    atorvastatin  40 mg Oral Daily    hydrALAZINE  100 mg Oral TID    tamsulosin  0.8 mg Oral Daily    sodium chloride flush  5-40 mL IntraVENous 2 times per day    cefTRIAXone (ROCEPHIN) IV  1,000 mg IntraVENous Q24H     Continuous Infusions:   sodium chloride       PRN Meds:.sodium chloride flush, sodium chloride, ondansetron **OR** ondansetron, polyethylene glycol, aluminum & magnesium hydroxide-simethicone, acetaminophen **OR** acetaminophen    LABS     Recent Labs     11/22/24  1450 11/23/24  0516 11/24/24  0921   WBC  --  6.7 5.6   HGB 7.5* 8.4* 8.4*   HCT 23.5* 27.3* 27.0*   PLT  --  162 154   NA  --  140 139   K  --  3.9 4.1   CL  --  112* 107   CO2  --  16* 17*   BUN  --  51* 44*   CREATININE  --  2.0* 2.0*   CALCIUM  --  7.9* 8.0*       ASSESSMENT   Hospital day # 4  89y.o. male with urinary retention, distended bladder, bilateral hydroureteronephrosis, AURELIO.  Urine culture - Aerococcus species  Creatinine remains  elevated at 2.0  PLAN   Get SANDRA today to evaluate hydronephrosis, VT based on findings.   Continue antibiotics.     Chio Colon, APRN - CNP 11/25/2024 8:50 AM

## 2024-11-25 NOTE — PROGRESS NOTES
Shift assessment completed.  Pt A&O, VSS.  Winslow catheter in place per order, draining well.  Denies any needs at this time.  Bed locked and in lowest position, side rails up 2/4.  Call light within reach.

## 2024-11-25 NOTE — DISCHARGE INSTR - COC
Continuity of Care Form    Patient Name: Javier De La Torre   :  1929  MRN:  [id number]    Admit date:  2024  Discharge date:      Code Status Order: Full Code   Advance Directives:   Advance Care Flowsheet Documentation             Admitting Physician:  Brook Le MD  PCP: Keyla Juan    Discharging Nurse: Jennifer VELASQUEZ  Discharging Hospital Unit/Room#: 0322/0322-01  Discharging Unit Phone Number: [phone]    Emergency Contact:   Extended Emergency Contact Information  Primary Emergency Contact: Mariel Juan   [facility]  Home Phone: [phone]  Relation: Child  Secondary Emergency Contact: Haley Mendoza  Home Phone: [phone]  Relation: Child    Past Surgical History:  Past Surgical History:   Procedure Laterality Date    CARDIAC SURGERY  2016    open heart       Immunization History:     There is no immunization history on file for this patient.    Active Problems:  Patient Active Problem List   Diagnosis Code    Hypotension I95.9    PAF (paroxysmal atrial fibrillation) ([facility]) I48.0    Typical atrial flutter ([facility]) I48.3    Renal insufficiency N28.9    ASHD (arteriosclerotic heart disease) I25.10    Chronic heart failure with preserved ejection fraction ([facility]) I50.32    Primary hypertension I10    UTI (urinary tract infection) N39.0       Isolation/Infection:   Isolation            No Isolation          Patient Infection Status       None to display            Nurse Assessment:  Last Vital Signs: BP (!) 147/63   Pulse (!) 108   Temp 98 °F (36.7 °C) (Oral)   Resp 18   Ht 1.803 m (5' 11\")   Wt 60.3 kg (132 lb 15 oz)   SpO2 96%   BMI 18.54 kg/m²     Last documented pain score (0-10 scale): Pain Level: 0  Last Weight:   Wt Readings from Last 1 Encounters:   24 60.3 kg (132 lb 15 oz)     Mental Status:  oriented and Intermittent confusion    IV Access:  - None    Nursing Mobility/ADLs:  Walking   Assisted  Transfer  Assisted  Bathing  Assisted  Dressing   Assisted  Toileting  Assisted  Feeding  Independent  Med Admin  Assisted  Med Delivery   whole    Wound Care Documentation and Therapy:        Elimination:  Continence:   Bowel: Yes  Bladder: No  Urinary Catheter: Insertion Date: 11/27 and Indication for Use of Catheter: Urology/Urologist seeing this patient or inserted indwelling catheter   Colostomy/Ileostomy/Ileal Conduit: No       Date of Last BM: 11/24    Intake/Output Summary (Last 24 hours) at 11/25/2024 1530  Last data filed at 11/25/2024 1459  Gross per 24 hour   Intake 730 ml   Output 2050 ml   Net -1320 ml     I/O last 3 completed shifts:  In: 730 [P.O.:720; I.V.:10]  Out: 2875 [Urine:2875]    Safety Concerns:     At Risk for Falls    Impairments/Disabilities:      None    Nutrition Therapy:  Current Nutrition Therapy:   - Oral Diet:  General    Routes of Feeding: Oral  Liquids: Thin Liquids  Daily Fluid Restriction: no  Last Modified Barium Swallow with Video (Video Swallowing Test): not done    Treatments at the Time of Hospital Discharge:   Respiratory Treatments:   Oxygen Therapy:  is not on home oxygen therapy.  Ventilator:    - No ventilator support    Rehab Therapies: Physical Therapy and Occupational Therapy  Weight Bearing Status/Restrictions: No weight bearing restrictions  Other Medical Equipment (for information only, NOT a DME order):  walker  Other Treatments:     Patient's personal belongings (please select all that are sent with patient):  None    RN SIGNATURE:  Electronically signed by Jennifer Ghosh RN on 11/29/24 at 2:41 PM EST    CASE MANAGEMENT/SOCIAL WORK SECTION    Inpatient Status Date: 11/21/2024    Readmission Risk Assessment Score:  Readmission Risk              Risk of Unplanned Readmission:  18           Discharging to Facility/ Agency   [facility]  Services: Skilled Nursing  [address] [place], [place] [id number] [phone]   / signature: Electronically signed by GERMAINE Villarreal on

## 2024-11-25 NOTE — PROGRESS NOTES
[facility] Medicine Progress Note  V 10.25      Date of Admission: 11/21/2024    Hospital Day: 5      Chief Admission Complaint: Altered mental status    Subjective: EMR and notes reviewed pt seen and examined, sitting up having lunch in NAD, no new complaints. Pleasantly confused to details.     Presenting Admission History:       89 y.o. male who presented to [facility] from the nursing home with complaints of mental status changes that started yesterday.  There was also report of poor oral intake.  Today, patient developed hematuria.  Patient's himself cannot give history.  Keeps asking\" what do I do now\".  Since being admitted, patient has had very limited urine output.  He denies burning micturition.  Denies any urinary frequency.  Patient denies any chest pain.  Denies any shortness of breath.  Denies any nausea or vomiting.     In the emergency room, patient was noted to have increased creatinine at 2.4 from 1.4 about a month and a half ago, evidence of urinary tract infection and a hemoglobin of 8.6 from 10.6 about a month and a half ago     CT scan done in the emergency room showed evidence of bilateral hydroureteronephrosis, with no obstructive calculi.  There was diffuse circumferential urinary bladder wall thickening consistent with cystitis.  There were also findings consistent with hemorrhagic cysts      PMHx significant for atrial fibrillation, essential hypertension, chronic kidney disease, dyslipidemia.    Assessment/Plan:      Current Principal Problem:  UTI (urinary tract infection)      Obstructive uropathy with AURELIO on CKD 3B due to problem and UTI and BPH   - POA with UA > 100 WBC. + ozzy, large LE, UC back with Aerococcus species. Initiated on Rocephin 11/22 - will cont until Urology work up is completed.   - CRT on arrival was 2.4 baseline  ~ 1.5,  suspect secondary to obstruction, cont to trend is at 2.00 today 11/25    - CT of abd and pelvis with evangelist hydro bladder wall thickness- Urology

## 2024-11-25 NOTE — PROGRESS NOTES
Occupational Therapy  Facility/Department: [facility] C3 TELE/MED SURG/ONC  Daily Treatment Note  NAME: Javier De La Torre  : 1929  MRN: [id number]    Date of Service: 2024    Discharge Recommendations:  Subacute/Skilled Nursing Facility   Therapy discharge recommendations are subject to collaboration from the patient’s interdisciplinary healthcare team, including MD and case management recommendations.    Barriers to Home Discharge:   [] Steps to access home entry or bed/bath:   [x] Unable to transfer, ambulate, or propel wheelchair household distances without assist   [] Limited available assist at home upon discharge    [] Patient or family requests d/c to post-acute facility    [x] Poor cognition/safety awareness    [] Unable to maintain ordered weight bearing status    [x] Patient is at risk for  immobility   [x] Decreased independence with ADLs   [x] Increased risk for falls   [] Other:    If pt is unable to be seen after this session, please let this note serve as discharge summary.  Please see case management note for discharge disposition.  Thank you.    Patient Diagnosis(es): The primary encounter diagnosis was Acute cystitis with hematuria. Diagnoses of Hydronephrosis, unspecified hydronephrosis type, Gross hematuria, AURELIO (acute kidney injury) (HCC), and Atrial fibrillation, unspecified type (HCC) were also pertinent to this visit.     Assessment   Assessment: Pt seen for OT tx. He was SBA for bed mobility and CGA for standing balance/transfers with walker. He required vc's throughout mobility d/t impaired hearing and decreased safety awareness. Pt reports general weakness during activity.  He was encouraged to participate to improve activity tolerance. Pt requires extensive assist for self-care and has decreased attention with these tasks as well.  Recommend SNF for skilled OT to improve function to baseline status. Cont OT in acute care.  Activity Tolerance: Patient limited by fatigue  Discharge

## 2024-11-25 NOTE — CARE COORDINATION
Hospital day 4: Patient on C3 re UTI care managed by IM and Urology. Patient from [facility], no barrier to return. Plans for renal US, then void trail. SW following.GERMAINE Villarreal

## 2024-11-26 LAB
ANION GAP SERPL CALCULATED.3IONS-SCNC: 11 MMOL/L (ref 3–16)
BUN SERPL-MCNC: 34 MG/DL (ref 7–20)
CALCIUM SERPL-MCNC: 8.1 MG/DL (ref 8.3–10.6)
CHLORIDE SERPL-SCNC: 106 MMOL/L (ref 99–110)
CO2 SERPL-SCNC: 21 MMOL/L (ref 21–32)
CREAT SERPL-MCNC: 1.8 MG/DL (ref 0.8–1.3)
GFR SERPLBLD CREATININE-BSD FMLA CKD-EPI: 34 ML/MIN/{1.73_M2}
GLUCOSE SERPL-MCNC: 159 MG/DL (ref 70–99)
POTASSIUM SERPL-SCNC: 4.2 MMOL/L (ref 3.5–5.1)
SODIUM SERPL-SCNC: 138 MMOL/L (ref 136–145)

## 2024-11-26 PROCEDURE — 6360000002 HC RX W HCPCS: Performed by: NURSE PRACTITIONER

## 2024-11-26 PROCEDURE — 80048 BASIC METABOLIC PNL TOTAL CA: CPT

## 2024-11-26 PROCEDURE — 1200000000 HC SEMI PRIVATE

## 2024-11-26 PROCEDURE — 6360000002 HC RX W HCPCS: Performed by: STUDENT IN AN ORGANIZED HEALTH CARE EDUCATION/TRAINING PROGRAM

## 2024-11-26 PROCEDURE — 36415 COLL VENOUS BLD VENIPUNCTURE: CPT

## 2024-11-26 PROCEDURE — 6370000000 HC RX 637 (ALT 250 FOR IP): Performed by: INTERNAL MEDICINE

## 2024-11-26 PROCEDURE — 97110 THERAPEUTIC EXERCISES: CPT

## 2024-11-26 PROCEDURE — 6370000000 HC RX 637 (ALT 250 FOR IP): Performed by: STUDENT IN AN ORGANIZED HEALTH CARE EDUCATION/TRAINING PROGRAM

## 2024-11-26 PROCEDURE — 2580000003 HC RX 258: Performed by: INTERNAL MEDICINE

## 2024-11-26 PROCEDURE — 2580000003 HC RX 258: Performed by: NURSE PRACTITIONER

## 2024-11-26 PROCEDURE — 51798 US URINE CAPACITY MEASURE: CPT

## 2024-11-26 PROCEDURE — 97530 THERAPEUTIC ACTIVITIES: CPT

## 2024-11-26 RX ADMIN — TAMSULOSIN HYDROCHLORIDE 0.8 MG: 0.4 CAPSULE ORAL at 08:09

## 2024-11-26 RX ADMIN — SODIUM CHLORIDE, PRESERVATIVE FREE 10 ML: 5 INJECTION INTRAVENOUS at 20:08

## 2024-11-26 RX ADMIN — HEPARIN SODIUM 5000 UNITS: 5000 INJECTION INTRAVENOUS; SUBCUTANEOUS at 06:35

## 2024-11-26 RX ADMIN — HEPARIN SODIUM 5000 UNITS: 5000 INJECTION INTRAVENOUS; SUBCUTANEOUS at 14:02

## 2024-11-26 RX ADMIN — HYDRALAZINE HYDROCHLORIDE 100 MG: 50 TABLET ORAL at 20:08

## 2024-11-26 RX ADMIN — SODIUM CHLORIDE, PRESERVATIVE FREE 10 ML: 5 INJECTION INTRAVENOUS at 08:09

## 2024-11-26 RX ADMIN — CEFTRIAXONE SODIUM 1000 MG: 1 INJECTION, POWDER, FOR SOLUTION INTRAMUSCULAR; INTRAVENOUS at 17:29

## 2024-11-26 RX ADMIN — HEPARIN SODIUM 5000 UNITS: 5000 INJECTION INTRAVENOUS; SUBCUTANEOUS at 20:08

## 2024-11-26 RX ADMIN — HYDRALAZINE HYDROCHLORIDE 100 MG: 50 TABLET ORAL at 14:02

## 2024-11-26 RX ADMIN — HYDRALAZINE HYDROCHLORIDE 100 MG: 50 TABLET ORAL at 08:09

## 2024-11-26 RX ADMIN — ATORVASTATIN CALCIUM 40 MG: 40 TABLET, FILM COATED ORAL at 20:08

## 2024-11-26 ASSESSMENT — PAIN SCALES - GENERAL
PAINLEVEL_OUTOF10: 0
PAINLEVEL_OUTOF10: 0

## 2024-11-26 NOTE — PROGRESS NOTES
Assessment completed and documented. Patient alert and oriented. Patient states vision is still doubled when looking straight ahead, however said it seems like it is improving. Patient denies any numbness or tingling or weakness, other than double vision, neuro assessment is negative.  Call light within reach, bed locked and in lowest position.    All questions and concerns addressed at this time.

## 2024-11-26 NOTE — PROGRESS NOTES
Discharge with ferrell catheter if replaced and follow up outpatient.  Continue antibiotics.     Chio Colon, APRN - CNP 11/26/2024 8:39 AM

## 2024-11-26 NOTE — CARE COORDINATION
Patient is from [facility], and transitioning from  skilled to LTC. Von from [facility] states she is working with the wife on obtaining medicaid but is having some difficulty getting a medicaid confirmation but is working on it.    [facility] no barrier to return when medically stable. Writer told as of today patient can return; will update dc planner if any barriers to return.     LG

## 2024-11-26 NOTE — PROGRESS NOTES
[facility] Medicine Progress Note  V 10.25      Date of Admission: 11/21/2024    Hospital Day: 6      Chief Admission Complaint: Altered mental status    Subjective: EMR and notes reviewed pt seen and examined, NAD, no new complaints. Pleasantly confused to details.     Presenting Admission History:       89 y.o. male who presented to [facility] the nursing home with complaints of mental status changes that started yesterday.  There was also report of poor oral intake.  Today, patient developed hematuria.  Patient's himself cannot give history.  Keeps asking\" what do I do now\".  Since being admitted, patient has had very limited urine output.  He denies burning micturition.  Denies any urinary frequency.  Patient denies any chest pain.  Denies any shortness of breath.  Denies any nausea or vomiting.     In the emergency room, patient was noted to have increased creatinine at 2.4 from 1.4 about a month and a half ago, evidence of urinary tract infection and a hemoglobin of 8.6 from 10.6 about a month and a half ago     CT scan done in the emergency room showed evidence of bilateral hydroureteronephrosis, with no obstructive calculi.  There was diffuse circumferential urinary bladder wall thickening consistent with cystitis.  There were also findings consistent with hemorrhagic cysts      PMHx significant for atrial fibrillation, essential hypertension, chronic kidney disease, dyslipidemia.    Assessment/Plan:      Current Principal Problem:  UTI (urinary tract infection)      Obstructive uropathy with AURELIO on CKD 3B due to problem and UTI and BPH   - POA with UA > 100 WBC. + ozzy, large LE, UC back with Aerococcus species. Initiated on Rocephin 11/22 - will cont until Urology work up is completed.   - CRT on arrival was 2.4 baseline  ~ 1.5,  suspect secondary to obstruction, cont to trend is at 2.00 today 11/25    - CT of abd and pelvis with evangelist hydro bladder wall thickness- Urology consulted- notes reviewed  SpO2 97%   BMI 18.69 kg/m²       Diet: ADULT DIET; Regular; Low Fat/Low Chol/High Fiber/2 gm Na    DVT Prophylaxis: []PPx LMWH  [x]SQ Heparin  [x]IPC/SCDs  []Eliquis  []Xarelto  []Coumadin  [] Heparin Drip  []Other -      Code status: Full Code    PT/OT Eval Status:   []NOT yet ordered  []Ordered and Pending   [x]Seen with Recommendations for:   []Home independently  []Home w/ assist  []HHC  [x]SNF  []Acute Rehab    Multi-Disciplinary Rounds with Case Management completed on 11/26/2024 with the following recommendations:  Anticipated Discharge Location: []Home w/ []HHC vs [x]SNF  []Acute Rehab  []LTAC  []Hospice  []Other -    Anticipated Discharge Day/Date: 11/27 pending cert  Barriers to Discharge: voiding trial  --------------------------------------------------    MDM (any 2 required for High level billing)    A. Problems (any 1)  [] Acute/Chronic Illness/injury posing ongoing threat to life and/or bodily function without ongoing treatment    [] Severe exacerbation of chronic illness    --------------------------------------------------  B. Risk of Treatment (any 1)    [] Drugs/treatments that require intensive monitoring for toxicity    [] IV ABX (Vancomycin, Aminoglycosides, etc)     [] Post-Cath/Contrast study requiring serial monitoring    [] IV Narcotic analgesia    [] Aggressive IV diuresis    [] Hypertonic Saline    [] Critical electrolyte abnormalities requiring IV replacement    [] Insulin - Scheduled/SSI or Insulin gtt    [] Anticoagulation (Heparin gtt or Coumadin - other anticoagulants in special circumstances)    [] Cardiac Medications (IV Amiodarone/Diltiazem, Tikosyn, etc)    [] Hemodialysis    [] Other -    [] Change in code status    [] Decision to escalate care    [] Major surgery/procedure with associated risk factors    --------------------------------------------------  C. Data (any 2)    [x] Data Review (any 3)    [x] Consultant notes from yesterday/today urology  [x] All available current

## 2024-11-26 NOTE — PROGRESS NOTES
Plan  General Plan: 3-5 times per week  Current Treatment Recommendations: Strengthening;ROM;Balance training;Functional mobility training;Transfer training;Endurance training;Gait training;Pain management;Home exercise program;Safety education & training;Patient/Caregiver education & training;Therapeutic activities    Restrictions  Restrictions/Precautions  Restrictions/Precautions: Fall Risk, Up as Tolerated  Required Braces or Orthoses?: No  Position Activity Restriction  Other position/activity restrictions: tele, ferrell     Subjective   Subjective  Subjective: Pt agreeable to work with PT this session, very pleasant and cooperative. Using the bathroom with RN assist  Pain: Griffin pain at rest  Orientation  Overall Orientation Status: Impaired  Orientation Level: Oriented to person;Oriented to situation;Disoriented to place;Disoriented to time  Cognition  Overall Cognitive Status: Exceptions  Arousal/Alertness: Appropriate responses to stimuli  Following Commands: Follows one step commands with increased time;Follows one step commands with repetition  Attention Span: Attends with cues to redirect;Difficulty attending to directions  Memory: Decreased short term memory;Decreased recall of recent events  Safety Judgement: Decreased awareness of need for safety  Problem Solving: Decreased awareness of errors  Insights: Decreased awareness of deficits  Initiation: Requires cues for some  Sequencing: Requires cues for some    Objective  Vitals  Pulse: 89  BP: 119/65  MAP (Calculated): 83  SpO2: 97 %  Bed Mobility Training  Bed Mobility Training: Yes  Interventions: Safety awareness training;Verbal cues  Sit to Supine: Stand-by assistance  Balance  Sitting: Intact  Standing: Impaired  Standing - Static: Fair;Constant support  Standing - Dynamic: Fair;Constant support  Transfer Training  Transfer Training: Yes  Overall Level of Assistance: Minimum assistance;Assist X1;Adaptive equipment;Additional time  Interventions:  using bedrails?: A Little  How much help is needed moving from lying on your back to sitting on the side of a flat bed without using bedrails?: A Little  How much help is needed moving to and from a bed to a chair?: A Little  How much help is needed standing up from a chair using your arms?: A Little  How much help is needed walking in hospital room?: A Little  How much help is needed climbing 3-5 steps with a railing?: A Lot  AM-PAC Inpatient Mobility Raw Score : 17  AM-PAC Inpatient T-Scale Score : 42.13  Mobility Inpatient CMS 0-100% Score: 50.57  Mobility Inpatient CMS G-Code Modifier : CK         Therapy Time   Individual Concurrent Group Co-treatment   Time In 1455         Time Out 1520         Minutes 25                 Tyra Carrillo, PT

## 2024-11-27 LAB
ANION GAP SERPL CALCULATED.3IONS-SCNC: 9 MMOL/L (ref 3–16)
BACTERIA URNS QL MICRO: ABNORMAL /HPF
BILIRUB UR QL STRIP.AUTO: NEGATIVE
BUN SERPL-MCNC: 38 MG/DL (ref 7–20)
CALCIUM SERPL-MCNC: 7.9 MG/DL (ref 8.3–10.6)
CHLORIDE SERPL-SCNC: 106 MMOL/L (ref 99–110)
CK SERPL-CCNC: 47 U/L (ref 39–308)
CLARITY UR: CLEAR
CO2 SERPL-SCNC: 21 MMOL/L (ref 21–32)
COLOR UR: YELLOW
CREAT SERPL-MCNC: 2 MG/DL (ref 0.8–1.3)
CREAT UR-MCNC: 80.8 MG/DL (ref 39–259)
DEPRECATED RDW RBC AUTO: 15.8 % (ref 12.4–15.4)
EPI CELLS #/AREA URNS HPF: ABNORMAL /HPF (ref 0–5)
GFR SERPLBLD CREATININE-BSD FMLA CKD-EPI: 30 ML/MIN/{1.73_M2}
GLUCOSE SERPL-MCNC: 94 MG/DL (ref 70–99)
GLUCOSE UR STRIP.AUTO-MCNC: NEGATIVE MG/DL
HCT VFR BLD AUTO: 23 % (ref 40.5–52.5)
HGB BLD-MCNC: 7.5 G/DL (ref 13.5–17.5)
HGB UR QL STRIP.AUTO: NEGATIVE
KETONES UR STRIP.AUTO-MCNC: NEGATIVE MG/DL
LEUKOCYTE ESTERASE UR QL STRIP.AUTO: ABNORMAL
MCH RBC QN AUTO: 31.8 PG (ref 26–34)
MCHC RBC AUTO-ENTMCNC: 32.6 G/DL (ref 31–36)
MCV RBC AUTO: 97.4 FL (ref 80–100)
NITRITE UR QL STRIP.AUTO: NEGATIVE
PH UR STRIP.AUTO: 6 [PH] (ref 5–8)
PLATELET # BLD AUTO: 157 K/UL (ref 135–450)
PMV BLD AUTO: 8.4 FL (ref 5–10.5)
POTASSIUM SERPL-SCNC: 4.4 MMOL/L (ref 3.5–5.1)
PROT UR STRIP.AUTO-MCNC: 30 MG/DL
RBC # BLD AUTO: 2.36 M/UL (ref 4.2–5.9)
RBC #/AREA URNS HPF: ABNORMAL /HPF (ref 0–4)
SODIUM SERPL-SCNC: 136 MMOL/L (ref 136–145)
SODIUM UR-SCNC: 50 MMOL/L
SP GR UR STRIP.AUTO: 1.02 (ref 1–1.03)
UA DIPSTICK W REFLEX MICRO PNL UR: YES
URN SPEC COLLECT METH UR: ABNORMAL
UROBILINOGEN UR STRIP-ACNC: 0.2 E.U./DL
WBC # BLD AUTO: 6.1 K/UL (ref 4–11)
WBC #/AREA URNS HPF: ABNORMAL /HPF (ref 0–5)

## 2024-11-27 PROCEDURE — 97110 THERAPEUTIC EXERCISES: CPT

## 2024-11-27 PROCEDURE — 1200000000 HC SEMI PRIVATE

## 2024-11-27 PROCEDURE — 81001 URINALYSIS AUTO W/SCOPE: CPT

## 2024-11-27 PROCEDURE — 2580000003 HC RX 258: Performed by: INTERNAL MEDICINE

## 2024-11-27 PROCEDURE — 51798 US URINE CAPACITY MEASURE: CPT

## 2024-11-27 PROCEDURE — 6360000002 HC RX W HCPCS: Performed by: NURSE PRACTITIONER

## 2024-11-27 PROCEDURE — 82570 ASSAY OF URINE CREATININE: CPT

## 2024-11-27 PROCEDURE — 36415 COLL VENOUS BLD VENIPUNCTURE: CPT

## 2024-11-27 PROCEDURE — 85027 COMPLETE CBC AUTOMATED: CPT

## 2024-11-27 PROCEDURE — 6370000000 HC RX 637 (ALT 250 FOR IP): Performed by: INTERNAL MEDICINE

## 2024-11-27 PROCEDURE — 6370000000 HC RX 637 (ALT 250 FOR IP): Performed by: STUDENT IN AN ORGANIZED HEALTH CARE EDUCATION/TRAINING PROGRAM

## 2024-11-27 PROCEDURE — 51702 INSERT TEMP BLADDER CATH: CPT

## 2024-11-27 PROCEDURE — 84300 ASSAY OF URINE SODIUM: CPT

## 2024-11-27 PROCEDURE — 80048 BASIC METABOLIC PNL TOTAL CA: CPT

## 2024-11-27 PROCEDURE — 6360000002 HC RX W HCPCS: Performed by: STUDENT IN AN ORGANIZED HEALTH CARE EDUCATION/TRAINING PROGRAM

## 2024-11-27 PROCEDURE — 82550 ASSAY OF CK (CPK): CPT

## 2024-11-27 PROCEDURE — 2580000003 HC RX 258: Performed by: NURSE PRACTITIONER

## 2024-11-27 PROCEDURE — 97530 THERAPEUTIC ACTIVITIES: CPT

## 2024-11-27 RX ORDER — HYDRALAZINE HYDROCHLORIDE 50 MG/1
50 TABLET, FILM COATED ORAL 3 TIMES DAILY
Status: DISCONTINUED | OUTPATIENT
Start: 2024-11-27 | End: 2024-11-29 | Stop reason: HOSPADM

## 2024-11-27 RX ORDER — SODIUM CHLORIDE 9 MG/ML
INJECTION, SOLUTION INTRAVENOUS CONTINUOUS
Status: ACTIVE | OUTPATIENT
Start: 2024-11-27 | End: 2024-11-28

## 2024-11-27 RX ADMIN — TAMSULOSIN HYDROCHLORIDE 0.8 MG: 0.4 CAPSULE ORAL at 09:56

## 2024-11-27 RX ADMIN — ATORVASTATIN CALCIUM 40 MG: 40 TABLET, FILM COATED ORAL at 20:57

## 2024-11-27 RX ADMIN — SODIUM CHLORIDE, PRESERVATIVE FREE 10 ML: 5 INJECTION INTRAVENOUS at 20:56

## 2024-11-27 RX ADMIN — SODIUM CHLORIDE: 9 INJECTION, SOLUTION INTRAVENOUS at 20:57

## 2024-11-27 RX ADMIN — HEPARIN SODIUM 5000 UNITS: 5000 INJECTION INTRAVENOUS; SUBCUTANEOUS at 16:05

## 2024-11-27 RX ADMIN — HEPARIN SODIUM 5000 UNITS: 5000 INJECTION INTRAVENOUS; SUBCUTANEOUS at 20:57

## 2024-11-27 RX ADMIN — CEFTRIAXONE SODIUM 1000 MG: 1 INJECTION, POWDER, FOR SOLUTION INTRAMUSCULAR; INTRAVENOUS at 16:44

## 2024-11-27 RX ADMIN — HEPARIN SODIUM 5000 UNITS: 5000 INJECTION INTRAVENOUS; SUBCUTANEOUS at 05:17

## 2024-11-27 RX ADMIN — SODIUM CHLORIDE, PRESERVATIVE FREE 10 ML: 5 INJECTION INTRAVENOUS at 09:57

## 2024-11-27 RX ADMIN — HYDRALAZINE HYDROCHLORIDE 50 MG: 50 TABLET ORAL at 16:43

## 2024-11-27 ASSESSMENT — PAIN SCALES - GENERAL: PAINLEVEL_OUTOF10: 0

## 2024-11-27 NOTE — CONSULTS
Consult Call Back    Who:Luciano Abbasi MD   Date:11/27/2024,  Time:12:34 PM    Electronically signed by Jigna Christine on 11/27/24 at 12:34 PM EST

## 2024-11-27 NOTE — PROGRESS NOTES
11/25  plan for SANDRA-no hydro  - monitor I/O and urine output   - cont flomax   -ferrell removed, voiding trial 11/26    AURELIO on CKD 3b-baseline ~1.5, scr on arrival 2.4. Scr 2.0 11/27. Nephrology consulted    Paroxysmal A-fib: status post left atrial appendage closure not on anticoagulation   - rate controlled, cont tele     Metabolic encephalopathy: suspect dementia at baseline, perhaps worsening in the setting of UTI  - cont supportive  - suspect he is at baseline     Gross hematuria, resolved    Acute on chronic normocytic anemia- stable cont to monitor     Essential hypertension: controlled cont to montior     Hypomagnesemia: replaced and monitor     Urology consulted and appreciated.  Available consultant notes from yesterday and today were reviewed on 11/27/2024, w/ plan for continued inpatient w/up and management      Nephrology consulted and appreciated.  Available consultant notes from yesterday and today were reviewed on 11/27/2024, w/ plan for continued inpatient w/up and management -      Consults:      IP CONSULT TO HOSPITALIST  IP CONSULT TO CASE MANAGEMENT  IP CONSULT TO SOCIAL WORK  IP CONSULT TO UROLOGY  IP CONSULT TO NEPHROLOGY        --------------------------------------------------      Medications:        Infusion Medications    sodium chloride       Scheduled Medications    cefTRIAXone (ROCEPHIN) IV  1,000 mg IntraVENous Q24H    heparin (porcine)  5,000 Units SubCUTAneous 3 times per day    atorvastatin  40 mg Oral Daily    hydrALAZINE  100 mg Oral TID    tamsulosin  0.8 mg Oral Daily    sodium chloride flush  5-40 mL IntraVENous 2 times per day     PRN Meds: sodium chloride flush, sodium chloride, ondansetron **OR** ondansetron, polyethylene glycol, aluminum & magnesium hydroxide-simethicone, acetaminophen **OR** acetaminophen      Physical Exam Performed:      General appearance: awake and alert in NAD   Respiratory:  Normal respiratory effort.   Cardiovascular:  Regular rate and  Amiodarone/Diltiazem, Tikosyn, etc)    [] Hemodialysis    [] Other -    [] Change in code status    [] Decision to escalate care    [] Major surgery/procedure with associated risk factors    --------------------------------------------------  C. Data (any 2)    [x] Data Review (any 3)    [x] Consultant notes from yesterday/today urology  [x] All available current labs reviewed interpreted for clinical significance    [x] Appropriate follow-up labs were ordered  [] Collateral history obtained     [] Independent Interpretation of tests (any 1)    [] Telemetry (Rhythm Strip) personally reviewed and interpreted        [] Imaging personally reviewed and interpreted     [x] Discussion (any 1)  [x] Multi-Disciplinary Rounds with Case Management  [] Discussed management of the case with           Labs:  Personally reviewed on 11/27/2024 and interpreted for clinical significance as documented above.     Recent Labs     11/27/24  0436   WBC 6.1   HGB 7.5*   HCT 23.0*        Recent Labs     11/26/24  0903 11/27/24  0436    136   K 4.2 4.4    106   CO2 21 21   BUN 34* 38*   CREATININE 1.8* 2.0*   CALCIUM 8.1* 7.9*     No results for input(s): \"PROBNP\", \"TROPHS\" in the last 72 hours.  No results for input(s): \"LABA1C\" in the last 72 hours.  No results for input(s): \"AST\", \"ALT\", \"BILIDIR\", \"BILITOT\", \"ALKPHOS\" in the last 72 hours.    No results for input(s): \"INR\", \"LACTA\", \"TSH\" in the last 72 hours.    Urine Cultures:   Lab Results   Component Value Date/Time    LABURIN  11/21/2024 05:39 PM     >100,000 CFU/ml  Susceptibility testing not routinely done. No further work up.       Blood Cultures:   Lab Results   Component Value Date/Time    BC No Growth after 4 days of incubation. 11/21/2024 05:25 PM     Lab Results   Component Value Date/Time    BLOODCULT2 No Growth after 4 days of incubation. 11/21/2024 05:25 PM     Organism:   Lab Results   Component Value Date/Time    ORG Aerococcus species 11/21/2024

## 2024-11-27 NOTE — PROGRESS NOTES
Physical Therapy  Facility/Department: [facility] C3 TELE/MED SURG/ONC  Daily Treatment Note  NAME: Javier De La Torre  : 1929  MRN: [id number]    Date of Service: 2024    Discharge Recommendations:  Subacute/Skilled Nursing Facility   PT Equipment Recommendations  Equipment Needed: No  Other: defer    Barriers to Home Discharge:   [] Steps to access home entry or bed/bath:   [x] Unable to transfer, ambulate, or propel wheelchair household distances without assist   [x] Limited available assist at home upon discharge    [x] Patient or family requests d/c to post-acute facility    [x] Poor cognition/safety awareness for d/c to home alone    []Unable to maintain ordered weight bearing status    [] Patient with salient signs of long-standing immobility   [] Patient is at risk for falls due to:   [] Other:     If pt is unable to be seen after this session, please let this note serve as discharge summary.  Please see case management note for discharge disposition.  Thank you.    Patient Diagnosis(es): The primary encounter diagnosis was Acute cystitis with hematuria. Diagnoses of Hydronephrosis, unspecified hydronephrosis type, Gross hematuria, AURELIO (acute kidney injury) (HCC), and Atrial fibrillation, unspecified type (HCC) were also pertinent to this visit.    Assessment  Assessment: Pt tolerated treatment session well, but was limited by low BP (See Vitals). Pt demo'ed bed mobility SBA w/ HOB elevated and bed rails, functional STS w/ CGA. pt reported feeling light headedness when sitting EOB and standing, pt sat back EOB. Pt then tolerated BLE strengthening sitting EOB, BP continued to drop, pt laid back down in bed and BP rebounded. Pt pleasant and motivated to work w/ therapy. Pt continues to be below their baseline and will benefit from skilled PT intervention in order to address deficits, reach goals, and d/c safely. Nsg. notified. PT continues to rec SNF at this time.  Activity Tolerance: Treatment limited

## 2024-11-27 NOTE — CONSULTS
Consult Placed   Consult sent via perfect serve  Who: Dr. Tanner  Date: 11/27/2024  Time: 9:08 AM         Electronically signed by Jigna Christine on 11/27/2024 at 9:07 AM

## 2024-11-27 NOTE — CONSULTS
Risk  (2024)    Housing Stability Vital Sign     Unable to Pay for Housing in the Last Year: No     Number of Times Moved in the Last Year: 0     Homeless in the Last Year: No         Family History:   No family history on file.      Review of Systems:   Pertinent positives stated above in HPI. All other systems were reviewed and were negative.    Physical exam:   Constitutional:  /63   Pulse 68   Temp 97.9 °F (36.6 °C) (Oral)   Resp 16   Ht 1.803 m (5' 11\")   Wt 62.5 kg (137 lb 12.6 oz)   SpO2 97%   BMI 19.22 kg/m²   Gen:  awake, nad    Skin: no petechial rash, turgor wnl  Heent:  eomi, mmm  Neck: supple, No jvd noted   Cardiovascular:  RRR, S1, S2  Respiratory: no audible wheeze , symmetric movement  Abdomen:  soft , non tender    Ext: nontraumatic , no pedal edema  Psychiatric: Alert and oriented to self and place  Musculoskeletal:  moving extremities                                                                                BP  Min: 100/53  Max: 154/77                                  Pulse  Av.6  Min: 68  Max: 104    24HR INTAKE/OUTPUT:    Intake/Output Summary (Last 24 hours) at 2024 1149  Last data filed at 2024 0511  Gross per 24 hour   Intake 930 ml   Output 398 ml   Net 532 ml     Wt Readings from Last 3 Encounters:   24 62.5 kg (137 lb 12.6 oz)   24 68.2 kg (150 lb 5.7 oz)   24 68 kg (150 lb)          Data/    Labs:  CBC:   Recent Labs     24  0436   WBC 6.1   HGB 7.5*   HCT 23.0*          BMP:   Recent Labs     24  0903 24  0436    136   K 4.2 4.4    106   CO2 21 21   BUN 34* 38*   CREATININE 1.8* 2.0*   GLUCOSE 159* 94     LFT's: No results for input(s): \"AST\", \"ALT\", \"BILITOT\", \"ALKPHOS\" in the last 72 hours.    Invalid input(s): \"ALB\"  Troponin: No results for input(s): \"TROPONINI\" in the last 72 hours.  BNP: No results for input(s): \"BNP\" in the last 72 hours.  ABGs: No results for input(s): \"PHART\",  \"LSZ5NTW\", \"PO2ART\" in the last 72 hours.  INR: No results for input(s): \"INR\" in the last 72 hours.    U/A:No results for input(s): \"NITRITE\", \"COLORU\", \"PHUR\", \"LABCAST\", \"WBCUA\", \"RBCUA\", \"MUCUS\", \"TRICHOMONAS\", \"YEAST\", \"BACTERIA\", \"CLARITYU\", \"SPECGRAV\", \"LEUKOCYTESUR\", \"UROBILINOGEN\", \"BILIRUBINUR\", \"BLOODU\", \"GLUCOSEU\", \"AMORPHOUS\" in the last 72 hours.    Invalid input(s): \"KETONESU\"    Assessment    AURELIO on CKD 3  Likely in setting of recurring urinary retention, and volume depletion  BCR around 1.3.  Worsened to 2.2 this admission.  It was improving to 1.8 with a Winslow catheter and since it was removed it has worsened to 2.0 this morning of consultation            Anemia :   -Hemoglobin down to 7.5     Hypertension :  -On hydralazine 3 times daily      Obstructive uropathy/BPH  -Repeat ultrasound yesterday without any hydronephrosis      Plan:   -Winslow catheter was removed yesterday and creatinine is worsening again today.  - Recommend doing a bladder scan to reassess for urinary retention  - if significant retention again , will let urology know.  -Would benefit from some IV fluids once urinary retention is resolved as patient does looks slightly volume depleted  -Also repeat UA, urine sodium, urine creatinine  -Will decrease hydralazine to 50 mg 3 times a day .  Keep MAP greater than 70  -No urgent need of RRT.  Will continue to follow        Thank you for the consultation.  Please do not hesitate to call with questions.          ______________________________  Luciano Abbasi MD  [facility]  [email or web address]  Office: [phone]    (This chart has been completed using [employer]ict[employer] Software. Although attempts have been made to ensure accuracy, words and/or phrases may not be transcribed as intended.)

## 2024-11-27 NOTE — PROGRESS NOTES
Occupational Therapy  Attempted OT tx. Pt was finishing PT session. PT reported a decrease in pt's BP during activity.  Pt is now resting in bed. Cont OT.   Raven Hutchison OTR/L

## 2024-11-27 NOTE — CARE COORDINATION
Hospital day 6: Patient on C3 re UTI care managed by IM, Nephrology,and Urology. Patient from [facility] able to return at dc. DC pending neph recs. SW following.GERMAINE Villarreal

## 2024-11-28 LAB
ANION GAP SERPL CALCULATED.3IONS-SCNC: 9 MMOL/L (ref 3–16)
BUN SERPL-MCNC: 36 MG/DL (ref 7–20)
CALCIUM SERPL-MCNC: 8 MG/DL (ref 8.3–10.6)
CHLORIDE SERPL-SCNC: 106 MMOL/L (ref 99–110)
CO2 SERPL-SCNC: 22 MMOL/L (ref 21–32)
CREAT SERPL-MCNC: 1.9 MG/DL (ref 0.8–1.3)
GFR SERPLBLD CREATININE-BSD FMLA CKD-EPI: 32 ML/MIN/{1.73_M2}
GLUCOSE SERPL-MCNC: 96 MG/DL (ref 70–99)
POTASSIUM SERPL-SCNC: 4.1 MMOL/L (ref 3.5–5.1)
SODIUM SERPL-SCNC: 137 MMOL/L (ref 136–145)

## 2024-11-28 PROCEDURE — 6360000002 HC RX W HCPCS: Performed by: STUDENT IN AN ORGANIZED HEALTH CARE EDUCATION/TRAINING PROGRAM

## 2024-11-28 PROCEDURE — 36415 COLL VENOUS BLD VENIPUNCTURE: CPT

## 2024-11-28 PROCEDURE — 6370000000 HC RX 637 (ALT 250 FOR IP): Performed by: INTERNAL MEDICINE

## 2024-11-28 PROCEDURE — 6370000000 HC RX 637 (ALT 250 FOR IP): Performed by: NURSE PRACTITIONER

## 2024-11-28 PROCEDURE — 6370000000 HC RX 637 (ALT 250 FOR IP): Performed by: STUDENT IN AN ORGANIZED HEALTH CARE EDUCATION/TRAINING PROGRAM

## 2024-11-28 PROCEDURE — 80048 BASIC METABOLIC PNL TOTAL CA: CPT

## 2024-11-28 PROCEDURE — 1200000000 HC SEMI PRIVATE

## 2024-11-28 RX ORDER — SODIUM CHLORIDE 9 MG/ML
INJECTION, SOLUTION INTRAVENOUS CONTINUOUS
Status: DISCONTINUED | OUTPATIENT
Start: 2024-11-28 | End: 2024-11-29

## 2024-11-28 RX ORDER — DIPHENHYDRAMINE HCL 25 MG
12.5 TABLET ORAL EVERY 8 HOURS PRN
Status: DISCONTINUED | OUTPATIENT
Start: 2024-11-28 | End: 2024-11-29 | Stop reason: HOSPADM

## 2024-11-28 RX ADMIN — Medication: at 13:19

## 2024-11-28 RX ADMIN — HEPARIN SODIUM 5000 UNITS: 5000 INJECTION INTRAVENOUS; SUBCUTANEOUS at 05:30

## 2024-11-28 RX ADMIN — HYDRALAZINE HYDROCHLORIDE 50 MG: 50 TABLET ORAL at 08:53

## 2024-11-28 RX ADMIN — ATORVASTATIN CALCIUM 40 MG: 40 TABLET, FILM COATED ORAL at 20:46

## 2024-11-28 RX ADMIN — HEPARIN SODIUM 5000 UNITS: 5000 INJECTION INTRAVENOUS; SUBCUTANEOUS at 14:46

## 2024-11-28 RX ADMIN — HEPARIN SODIUM 5000 UNITS: 5000 INJECTION INTRAVENOUS; SUBCUTANEOUS at 21:50

## 2024-11-28 RX ADMIN — HYDRALAZINE HYDROCHLORIDE 50 MG: 50 TABLET ORAL at 14:46

## 2024-11-28 RX ADMIN — TAMSULOSIN HYDROCHLORIDE 0.8 MG: 0.4 CAPSULE ORAL at 08:53

## 2024-11-28 ASSESSMENT — PAIN SCALES - GENERAL: PAINLEVEL_OUTOF10: 0

## 2024-11-28 NOTE — PROGRESS NOTES
Department of Internal Medicine  Nephrology Progress Note        SUBJECTIVE:    We are following this patient for A/CKD.  The patient was seen and examined; he feels well today with no CP, SOB, nausea or vomiting.    ROS: No fever or chills.  Social: No family at bedside.    Physical Exam:    VITALS:  BP (!) 150/64   Pulse 62   Temp 98 °F (36.7 °C) (Oral)   Resp 18   Ht 1.803 m (5' 11\")   Wt 62.5 kg (137 lb 12.6 oz)   SpO2 97%   BMI 19.22 kg/m²     General appearance: Seems comfortable, no acute distress.  Neck: Trachea midline, thyroid normal.   Lungs:  Non labored breathing, CTA to anterior auscultation.  Heart:  S1S2 normal, rub or gallop. No peripheral edema.  Abdomen: Soft, non-tender, no organomegaly.   Skin: No lesions or rashes, warm to touch.     DATA:    CBC with Differential:    Lab Results   Component Value Date/Time    WBC 6.1 11/27/2024 04:36 AM    RBC 2.36 11/27/2024 04:36 AM    HGB 7.5 11/27/2024 04:36 AM    HCT 23.0 11/27/2024 04:36 AM     11/27/2024 04:36 AM    MCV 97.4 11/27/2024 04:36 AM    MCH 31.8 11/27/2024 04:36 AM    MCHC 32.6 11/27/2024 04:36 AM    RDW 15.8 11/27/2024 04:36 AM    LYMPHOPCT 35.8 11/24/2024 09:21 AM    MONOPCT 4.6 11/24/2024 09:21 AM    EOSPCT 2.4 11/24/2024 09:21 AM    BASOPCT 0.3 11/24/2024 09:21 AM    MONOSABS 0.3 11/24/2024 09:21 AM    LYMPHSABS 2.0 11/24/2024 09:21 AM    EOSABS 0.1 11/24/2024 09:21 AM    BASOSABS 0.0 11/24/2024 09:21 AM     BMP:    Lab Results   Component Value Date/Time     11/28/2024 05:09 AM    K 4.1 11/28/2024 05:09 AM     11/28/2024 05:09 AM    CO2 22 11/28/2024 05:09 AM    BUN 36 11/28/2024 05:09 AM    CREATININE 1.9 11/28/2024 05:09 AM    CALCIUM 8.0 11/28/2024 05:09 AM    GFRAA >60 02/18/2019 07:23 PM    LABGLOM 32 11/28/2024 05:09 AM    GLUCOSE 96 11/28/2024 05:09 AM       Assessment     AURELIO on CKD 3  Likely in setting of recurring urinary retention, and volume depletion  BCR around 1.3.  Worsened to 2.2 this admission.

## 2024-11-28 NOTE — PROGRESS NOTES
[facility] Medicine Progress Note  V 10.25      Date of Admission: 11/21/2024    Hospital Day: 8      Chief Admission Complaint: Altered mental status    Subjective: EMR and notes reviewed pt seen and examined, c/o back itching     Presenting Admission History:       89 y.o. male who presented to [facility] the [facility] home with complaints of mental status changes that started yesterday.  There was also report of poor oral intake.  Today, patient developed hematuria.  Patient's himself cannot give history.  Keeps asking\" what do I do now\".  Since being admitted, patient has had very limited urine output.  He denies burning micturition.  Denies any urinary frequency.  Patient denies any chest pain.  Denies any shortness of breath.  Denies any nausea or vomiting.     In the emergency room, patient was noted to have increased creatinine at 2.4 from 1.4 about a month and a half ago, evidence of urinary tract infection and a hemoglobin of 8.6 from 10.6 about a month and a half ago     CT scan done in the emergency room showed evidence of bilateral hydroureteronephrosis, with no obstructive calculi.  There was diffuse circumferential urinary bladder wall thickening consistent with cystitis.  There were also findings consistent with hemorrhagic cysts      PMHx significant for atrial fibrillation, essential hypertension, chronic kidney disease, dyslipidemia.    Assessment/Plan:      Current Principal Problem:  UTI (urinary tract infection)      Obstructive uropathy with AURELIO on CKD 3B due to problem and UTI and BPH   - POA with UA > 100 WBC. + ozzy, large LE, UC back with Aerococcus species. Initiated on Rocephin 11/22 - will cont until Urology work up is completed.   - CRT on arrival was 2.4 baseline  ~ 1.5,  suspect secondary to obstruction, cont to trend is at 2.00 today 11/25    - CT of abd and pelvis with evangelist hydro bladder wall thickness- Urology consulted- notes reviewed 11/25  plan for SANDRA-no hydro  - monitor

## 2024-11-29 VITALS
TEMPERATURE: 97.8 F | RESPIRATION RATE: 16 BRPM | HEIGHT: 71 IN | OXYGEN SATURATION: 97 % | WEIGHT: 137.6 LBS | HEART RATE: 78 BPM | DIASTOLIC BLOOD PRESSURE: 79 MMHG | SYSTOLIC BLOOD PRESSURE: 139 MMHG | BODY MASS INDEX: 19.26 KG/M2

## 2024-11-29 LAB
ANION GAP SERPL CALCULATED.3IONS-SCNC: 9 MMOL/L (ref 3–16)
BUN SERPL-MCNC: 37 MG/DL (ref 7–20)
CALCIUM SERPL-MCNC: 7.7 MG/DL (ref 8.3–10.6)
CHLORIDE SERPL-SCNC: 108 MMOL/L (ref 99–110)
CO2 SERPL-SCNC: 22 MMOL/L (ref 21–32)
CREAT SERPL-MCNC: 1.8 MG/DL (ref 0.8–1.3)
GFR SERPLBLD CREATININE-BSD FMLA CKD-EPI: 34 ML/MIN/{1.73_M2}
GLUCOSE SERPL-MCNC: 100 MG/DL (ref 70–99)
POTASSIUM SERPL-SCNC: 4.1 MMOL/L (ref 3.5–5.1)
SODIUM SERPL-SCNC: 139 MMOL/L (ref 136–145)

## 2024-11-29 PROCEDURE — 2580000003 HC RX 258: Performed by: INTERNAL MEDICINE

## 2024-11-29 PROCEDURE — 36415 COLL VENOUS BLD VENIPUNCTURE: CPT

## 2024-11-29 PROCEDURE — 6360000002 HC RX W HCPCS: Performed by: STUDENT IN AN ORGANIZED HEALTH CARE EDUCATION/TRAINING PROGRAM

## 2024-11-29 PROCEDURE — 80048 BASIC METABOLIC PNL TOTAL CA: CPT

## 2024-11-29 PROCEDURE — 6370000000 HC RX 637 (ALT 250 FOR IP): Performed by: INTERNAL MEDICINE

## 2024-11-29 RX ORDER — HYDRALAZINE HYDROCHLORIDE 50 MG/1
50 TABLET, FILM COATED ORAL 3 TIMES DAILY
COMMUNITY
Start: 2024-11-29

## 2024-11-29 RX ORDER — TAMSULOSIN HYDROCHLORIDE 0.4 MG/1
0.8 CAPSULE ORAL DAILY
Status: CANCELLED | COMMUNITY
Start: 2024-11-30

## 2024-11-29 RX ADMIN — SODIUM CHLORIDE, PRESERVATIVE FREE 10 ML: 5 INJECTION INTRAVENOUS at 10:08

## 2024-11-29 RX ADMIN — HEPARIN SODIUM 5000 UNITS: 5000 INJECTION INTRAVENOUS; SUBCUTANEOUS at 13:59

## 2024-11-29 RX ADMIN — HYDRALAZINE HYDROCHLORIDE 50 MG: 50 TABLET ORAL at 09:29

## 2024-11-29 RX ADMIN — HYDRALAZINE HYDROCHLORIDE 50 MG: 50 TABLET ORAL at 13:59

## 2024-11-29 RX ADMIN — HEPARIN SODIUM 5000 UNITS: 5000 INJECTION INTRAVENOUS; SUBCUTANEOUS at 06:14

## 2024-11-29 RX ADMIN — SODIUM CHLORIDE: 9 INJECTION, SOLUTION INTRAVENOUS at 06:17

## 2024-11-29 RX ADMIN — TAMSULOSIN HYDROCHLORIDE 0.8 MG: 0.4 CAPSULE ORAL at 09:29

## 2024-11-29 RX ADMIN — POLYETHYLENE GLYCOL 3350 17 G: 17 POWDER, FOR SOLUTION ORAL at 14:04

## 2024-11-29 NOTE — DISCHARGE SUMMARY
Hospital Medicine Discharge Summary    Patient: Javier De La Torre   : 1929     Admit Date: 2024   Discharge Date:   ***  Disposition:  []Home   []HHC  []SNF  []Acute Rehab  []LTAC  []Hospice  Code status:  []Full  []DNR/CCA  []Limited (DNR/CCA with Do Not Intubate)  []DNRCC  Condition at Discharge: Stable  Primary Care Provider: Keyla Juan    Admitting Provider: Brook Le MD  Discharge Provider: GEENA Loomis CNP     Discharge Diagnoses:      Active Hospital Problems    Diagnosis     UTI (urinary tract infection) [N39.0]        Presenting Admission History:      ***     Assessment/Plan:      ***    Physical Exam Performed:      BP (!) 146/66   Pulse 87   Temp 97.7 °F (36.5 °C) (Oral)   Resp 16   Ht 1.803 m (5' 11\")   Wt 62.4 kg (137 lb 9.6 oz)   SpO2 97%   BMI 19.19 kg/m²       General appearance:  No apparent distress, appears stated age and cooperative.  Respiratory:  Normal respiratory effort.   Cardiovascular:  Regular rate and rhythm.  Abdomen:  Soft, non-tender, non-distended.  Musculoskeletal:  No edema  Neurologic:  Non-focal  Psychiatric:  Alert and oriented    Patient Discharge Instructions:      Follow up:    1.  Primary Care Provider Keyla Juan in the next 1-2 weeks.      The patient was seen and examined on day of discharge and this discharge summary is in conjunction with any daily progress note from day of discharge. Time spent on discharge: 3*** minutes in the examination, evaluation, counseling and review of medications and discharge plan.    ------------------------------------------------------------------------------------------------------------------------------------------------------    Discharge Medications:   Current Discharge Medication List        Current Discharge Medication List        Current Discharge Medication List        CONTINUE these medications which have NOT CHANGED    Details   tamsulosin (FLOMAX) 0.4 MG capsule Take 1  suspect secondary to obstruction, cont to trend is at 1.8 on 11/29  - CT of abd and pelvis with evangelist hydro bladder wall thickness- Urology consulted- notes reviewed 11/25  plan for SANDRA-no hydro  - monitor I/O and urine output   - cont flomax   -ferrell removed, voiding trial 11/26     AURELIO on CKD 3b-baseline ~1.5, scr on arrival 2.4. Scr 1.8 11/29 s/p IVF. Nephrology consulted     Paroxysmal A-fib: status post left atrial appendage closure not on anticoagulation   - rate controlled, cont tele      Metabolic encephalopathy: suspect dementia at baseline, perhaps worsening in the setting of UTI  - cont supportive  - suspect he is at baseline      Gross hematuria, resolved     Acute on chronic normocytic anemia- stable cont to monitor      Essential hypertension: controlled cont to montior      Hypomagnesemia: replaced and monitor      Urology consulted and appreciated-signed off       Physical Exam Performed:      /79   Pulse 78   Temp 97.8 °F (36.6 °C) (Oral)   Resp 16   Ht 1.803 m (5' 11\")   Wt 62.4 kg (137 lb 9.6 oz)   SpO2 97%   BMI 19.19 kg/m²       General appearance:  No apparent distress, appears stated age and cooperative.  Respiratory:  Normal respiratory effort.   Cardiovascular:  Regular rate and rhythm.  Abdomen:  Soft, non-tender, non-distended.  Musculoskeletal:  No edema  Neurologic:  Non-focal  Psychiatric:  Alert, limited insight, mood stable    Patient Discharge Instructions:  PT/OT/SN, renal panel in 3-5 days, Follow up with Urology.    Follow up:    1.  Primary Care Provider Keyla Juan in the next 1-2 weeks.      The patient was seen and examined on day of discharge and this discharge summary is in conjunction with any daily progress note from day of discharge. Time spent on discharge: 35 minutes in the examination, evaluation, counseling and review of medications and discharge

## 2024-11-29 NOTE — PROGRESS NOTES
Pt a/o with intermittent confusion. VSS. All prescriptions, discharge and follow up instructions given to the family.  The patient and family verbalizes understanding and denies questions.  All belongings collected and sent with the patient.  The patient was discharged off the unit by transport in stable condition. Report given to staff at [facility].

## 2024-11-29 NOTE — CARE COORDINATION
CASE MANAGEMENT DISCHARGE SUMMARY    Discharge to: [facility]     Precertification completed: NA  Hospital Exemption Notification (HENS) completed: NA returning     IMM given: (date) 11/29/2024    New Durable Medical Equipment ordered/agency: [facility]    Transportation:    Medical Transport explained to pt/family. Pt/family voice no agency preference.    Agency used:[employer] [facility] [phone]   time:1600   Ambulance form completed: Yes    Confirmed discharge plan with: Patient in room call placed to Mariel dtr re confusion        Facility/Agency, name: [facility] [phone]        RN, name: RON Rodriguez     Note: Discharging nurse to complete ARABELLA, reconcile AVS, and place final copy with patient's discharge packet. RN to ensure that written prescriptions for  Level II medications are sent with patient to the facility as per protocol.     ..GERMAINE Villarreal

## 2024-11-29 NOTE — PROGRESS NOTES
....4 Eyes Skin Assessment     The patient is being assess for  LoW Austin Score    I agree that 2 RN's have performed a thorough Head to Toe Skin Assessment on the patient. ALL assessment sites listed below have been assessed.       Areas assessed by both nurses: RON Onofre/ RON Olson  [x]   Head, Face, and Ears   [x]   Shoulders, Back, and Chest  [x]   Arms, Elbows, and Hands   [x]   Coccyx, Sacrum, and IschIum  [x]   Legs, Feet, and Heels        Does the Patient have Skin Breakdown?   Scattered ecchymosis,skin tear left forearm, blanching redness  at sacrum, mepilex applied.        Austin Prevention initiated:  Yes   Wound Care Orders initiated:  No      WOC nurse consulted for Pressure Injury (Stage 3,4, Unstageable, DTI, NWPT, and Complex wounds), New and Established Ostomies:  No      Nurse 1 eSignature: Electronically signed by Kingston Medina RN on 11/29/24 at 7:50 AM EST    **SHARE this note so that the co-signing nurse is able to place an eSignature**    Nurse 2 eSignature: Electronically signed by Wesley Romero RN on 11/29/24 at 8:19 AM EST

## 2024-11-29 NOTE — PROGRESS NOTES
[facility] Medicine Progress Note  V 10.25      Date of Admission: 11/21/2024    Hospital Day: 9      Chief Admission Complaint: Altered mental status    Subjective: EMR and notes reviewed pt seen and examined, resting in bed, no events overnight    Presenting Admission History:       89 y.o. male who [facility] with complaints of mental status changes that started yesterday.  There was also report of poor oral intake.  Today, patient developed hematuria.  Patient's himself cannot give history.  Keeps asking\" what do I do now\".  Since being admitted, patient has had very limited urine output.  He denies burning micturition.  Denies any urinary frequency.  Patient denies any chest pain.  Denies any shortness of breath.  Denies any nausea or vomiting.     In the emergency room, patient was noted to have increased creatinine at 2.4 from 1.4 about a month and a half ago, evidence of urinary tract infection and a hemoglobin of 8.6 from 10.6 about a month and a half ago     CT scan done in the emergency room showed evidence of bilateral hydroureteronephrosis, with no obstructive calculi.  There was diffuse circumferential urinary bladder wall thickening consistent with cystitis.  There were also findings consistent with hemorrhagic cysts      PMHx significant for atrial fibrillation, essential hypertension, chronic kidney disease, dyslipidemia.    Assessment/Plan:      Current Principal Problem:  UTI (urinary tract infection)      Obstructive uropathy with AURELIO on CKD 3B due to problem and UTI and BPH   - POA with UA > 100 WBC. + ozzy, large LE, UC back with Aerococcus species. Initiated on Rocephin 11/22 - will cont until Urology work up is completed.   - CRT on arrival was 2.4 baseline  ~ 1.5,  suspect secondary to obstruction, cont to trend is at 1.8 on 11/29  - CT of abd and pelvis with evangelist hydro bladder wall thickness- Urology consulted- notes reviewed 11/25  plan for SANDRA-no  11/21/2024 05:39 PM         GEENA Loomis - CNP

## 2024-11-29 NOTE — CARE COORDINATION
Hospital day 8: Patient on C3 re UTI care managed by IM and Nephrology. Patient from the [facility] pending Nephrology recs. SW following.GERMAINE Villarreal

## 2024-11-29 NOTE — PROGRESS NOTES
Pt assessment completed and charted. VSS. Pt a/o with some intermittent confusion. Pt denies any pain. Educated pt on POC.  Bed in lowest position and wheels locked. Call light within reach. Bedside table within reach. Non-skid socks in place. Pt denies any other needs at this time.  Pt calls out appropriately.

## 2024-11-29 NOTE — PLAN OF CARE
Problem: Confusion  Goal: Confusion, delirium, dementia, or psychosis is improved or at baseline  Description: INTERVENTIONS:  1. Assess for possible contributors to thought disturbance, including medications, impaired vision or hearing, underlying metabolic abnormalities, dehydration, psychiatric diagnoses, and notify attending LIP  2. [place] high risk fall precautions, as indicated  3. Provide frequent short contacts to provide reality reorientation, refocusing and direction  4. Decrease environmental stimuli, including noise as appropriate  5. Monitor and intervene to maintain adequate nutrition, hydration, elimination, sleep and activity  6. If unable to ensure safety without constant attention obtain sitter and review sitter guidelines with assigned personnel  7. Initiate Psychosocial CNS and Spiritual Care consult, as indicated  Flowsheets (Taken 11/23/2024 1038)  Effect of thought disturbance (confusion, delirium, dementia, or psychosis) are managed with adequate functional status:   [place] high risk fall precautions, as indicated   Provide frequent short contacts to provide reality reorientation, refocusing and direction   Decrease environmental stimuli, including noise as appropriate   Monitor and intervene to maintain adequate nutrition, hydration, elimination, sleep and activity     
  Problem: Pain  Goal: Verbalizes/displays adequate comfort level or baseline comfort level  11/22/2024 0626 by Kenisha Pike, RN  Outcome: Progressing  11/22/2024 0626 by Kenisha Pike, RN  Outcome: Progressing     Problem: Skin/Tissue Integrity  Goal: Absence of new skin breakdown  Description: 1.  Monitor for areas of redness and/or skin breakdown  2.  Assess vascular access sites hourly  3.  Every 4-6 hours minimum:  Change oxygen saturation probe site  4.  Every 4-6 hours:  If on nasal continuous positive airway pressure, respiratory therapy assess nares and determine need for appliance change or resting period.  11/22/2024 0626 by Kenisha Pike, RN  Outcome: Progressing  11/22/2024 0626 by Kenisha Pike, RN  Outcome: Progressing     Problem: Safety - Adult  Goal: Free from fall injury  11/22/2024 0626 by Kenisha Pike, RN  Outcome: Progressing  11/22/2024 0626 by Kenisha Pike, RN  Outcome: Progressing     Problem: ABCDS Injury Assessment  Goal: Absence of physical injury  Outcome: Progressing     
  Problem: Pain  Goal: Verbalizes/displays adequate comfort level or baseline comfort level  11/22/2024 1351 by Behzad Navas RN  Outcome: Progressing  11/22/2024 0626 by Kenisha Pike RN  Outcome: Progressing  11/22/2024 0626 by Kenisha Pike RN  Outcome: Progressing     Problem: Skin/Tissue Integrity  Goal: Absence of new skin breakdown  Description: 1.  Monitor for areas of redness and/or skin breakdown  2.  Assess vascular access sites hourly  3.  Every 4-6 hours minimum:  Change oxygen saturation probe site  4.  Every 4-6 hours:  If on nasal continuous positive airway pressure, respiratory therapy assess nares and determine need for appliance change or resting period.  11/22/2024 1351 by Behzad Navas RN  Outcome: Progressing  11/22/2024 0626 by Kenisha Pike RN  Outcome: Progressing  11/22/2024 0626 by Kenisha Pike RN  Outcome: Progressing     Problem: Safety - Adult  Goal: Free from fall injury  11/22/2024 1351 by Behzad Navas RN  Outcome: Progressing  11/22/2024 0626 by Kenisha Pike RN  Outcome: Progressing  11/22/2024 0626 by Keinsha Pike, RN  Outcome: Progressing     Problem: ABCDS Injury Assessment  Goal: Absence of physical injury  11/22/2024 1351 by Behzad Navas RN  Outcome: Progressing  11/22/2024 0626 by Kenisha Pike RN  Outcome: Progressing     
  Problem: Skin/Tissue Integrity  Goal: Absence of new skin breakdown  Description: 1.  Monitor for areas of redness and/or skin breakdown  2.  Assess vascular access sites hourly  3.  Every 4-6 hours minimum:  Change oxygen saturation probe site  4.  Every 4-6 hours:  If on nasal continuous positive airway pressure, respiratory therapy assess nares and determine need for appliance change or resting period.  11/25/2024 0747 by Monique Shah RN  Outcome: Progressing  11/25/2024 0449 by Porsha Joya RN  Outcome: [facility] Progressing     Problem: Safety - Adult  Goal: Free from fall injury  11/25/2024 0747 by Monique Shah RN  Outcome: Progressing  11/25/2024 0449 by Porsha Joya RN  Outcome: [facility] Progressing     Problem: ABCDS Injury Assessment  Goal: Absence of physical injury  11/25/2024 0747 by Monique Shah RN  Outcome: Progressing  11/25/2024 0449 by Porsha Joya RN  Outcome: [facility] Progressing     Problem: Confusion  Goal: Confusion, delirium, dementia, or psychosis is improved or at baseline  Description: INTERVENTIONS:  1. Assess for possible contributors to thought disturbance, including medications, impaired vision or hearing, underlying metabolic abnormalities, dehydration, psychiatric diagnoses, and notify attending LIP  2. [place] high risk fall precautions, as indicated  3. Provide frequent short contacts to provide reality reorientation, refocusing and direction  4. Decrease environmental stimuli, including noise as appropriate  5. Monitor and intervene to maintain adequate nutrition, hydration, elimination, sleep and activity  6. If unable to ensure safety without constant attention obtain sitter and review sitter guidelines with assigned personnel  7. Initiate Psychosocial CNS and Spiritual Care consult, as indicated  11/25/2024 0747 by Monique Shah RN  Outcome: Progressing  11/25/2024 0449 by Porsha Joya RN  Outcome: [facility] Progressing     
  Problem: Skin/Tissue Integrity  Goal: Absence of new skin breakdown  Description: 1.  Monitor for areas of redness and/or skin breakdown  2.  Assess vascular access sites hourly  3.  Every 4-6 hours minimum:  Change oxygen saturation probe site  4.  Every 4-6 hours:  If on nasal continuous positive airway pressure, respiratory therapy assess nares and determine need for appliance change or resting period.  11/26/2024 0830 by Mitra Garza RN  Outcome: Progressing  11/26/2024 0007 by Leighann Hannah RN  Outcome: Progressing     Problem: Safety - Adult  Goal: Free from fall injury  11/26/2024 0830 by Mitra Garza RN  Outcome: Progressing  11/26/2024 0007 by Leighann Hannah RN  Outcome: Progressing     Problem: ABCDS Injury Assessment  Goal: Absence of physical injury  11/26/2024 0830 by Mitra Garza RN  Outcome: Progressing  11/26/2024 0007 by Leighann Hannah RN  Outcome: Progressing     
  Problem: Skin/Tissue Integrity  Goal: Absence of new skin breakdown  Description: 1.  Monitor for areas of redness and/or skin breakdown  2.  Assess vascular access sites hourly  3.  Every 4-6 hours minimum:  Change oxygen saturation probe site  4.  Every 4-6 hours:  If on nasal continuous positive airway pressure, respiratory therapy assess nares and determine need for appliance change or resting period.  11/27/2024 1106 by Jennifer Ghosh RN  Outcome: Progressing  11/27/2024 0449 by Sophia Liang RN  Outcome: Progressing     Problem: Safety - Adult  Goal: Free from fall injury  11/27/2024 1106 by Jennifer Ghosh RN  Outcome: Progressing  Flowsheets (Taken 11/27/2024 1106)  Free From Fall Injury:   Instruct family/caregiver on patient safety   Based on caregiver fall risk screen, instruct family/caregiver to ask for assistance with transferring infant if caregiver noted to have fall risk factors  11/27/2024 0449 by Sophia Liang RN  Outcome: Progressing  Flowsheets (Taken 11/27/2024 0449)  Free From Fall Injury:   Instruct family/caregiver on patient safety   Based on caregiver fall risk screen, instruct family/caregiver to ask for assistance with transferring infant if caregiver noted to have fall risk factors     Problem: ABCDS Injury Assessment  Goal: Absence of physical injury  11/27/2024 1106 by Jennifer Ghosh RN  Outcome: Progressing  Flowsheets (Taken 11/27/2024 1106)  Absence of Physical Injury: Implement safety measures based on patient assessment  11/27/2024 0449 by Sophia Liang RN  Outcome: Progressing  Flowsheets (Taken 11/27/2024 0449)  Absence of Physical Injury: Implement safety measures based on patient assessment     Problem: Confusion  Goal: Confusion, delirium, dementia, or psychosis is improved or at baseline  Description: INTERVENTIONS:  1. Assess for possible contributors to thought disturbance, including medications, impaired vision or hearing, underlying metabolic abnormalities, dehydration, 
  Problem: Skin/Tissue Integrity  Goal: Absence of new skin breakdown  Description: 1.  Monitor for areas of redness and/or skin breakdown  2.  Assess vascular access sites hourly  3.  Every 4-6 hours minimum:  Change oxygen saturation probe site  4.  Every 4-6 hours:  If on nasal continuous positive airway pressure, respiratory therapy assess nares and determine need for appliance change or resting period.  11/27/2024 2205 by Meena Gray RN  Outcome: Progressing  11/27/2024 1106 by Jennifer Ghosh RN  Outcome: Progressing     Problem: Safety - Adult  Goal: Free from fall injury  11/27/2024 2205 by Meena Gray RN  Outcome: Progressing  11/27/2024 1106 by Jennifer Ghosh RN  Outcome: Progressing  Flowsheets (Taken 11/27/2024 1106)  Free From Fall Injury:   Instruct family/caregiver on patient safety   Based on caregiver fall risk screen, instruct family/caregiver to ask for assistance with transferring infant if caregiver noted to have fall risk factors     Problem: ABCDS Injury Assessment  Goal: Absence of physical injury  11/27/2024 2205 by Meena Gray RN  Outcome: Progressing  Flowsheets (Taken 11/27/2024 2100)  Absence of Physical Injury: Implement safety measures based on patient assessment  11/27/2024 1106 by Jennifer Ghosh RN  Outcome: Progressing  Flowsheets (Taken 11/27/2024 1106)  Absence of Physical Injury: Implement safety measures based on patient assessment     Problem: Confusion  Goal: Confusion, delirium, dementia, or psychosis is improved or at baseline  Description: INTERVENTIONS:  1. Assess for possible contributors to thought disturbance, including medications, impaired vision or hearing, underlying metabolic abnormalities, dehydration, psychiatric diagnoses, and notify attending LIP  2. [place] high risk fall precautions, as indicated  3. Provide frequent short contacts to provide reality reorientation, refocusing and direction  4. Decrease environmental stimuli, including 
  Problem: Skin/Tissue Integrity  Goal: Absence of new skin breakdown  Description: 1.  Monitor for areas of redness and/or skin breakdown  2.  Assess vascular access sites hourly  3.  Every 4-6 hours minimum:  Change oxygen saturation probe site  4.  Every 4-6 hours:  If on nasal continuous positive airway pressure, respiratory therapy assess nares and determine need for appliance change or resting period.  11/28/2024 1027 by Raegan Phelps RN  Outcome: Progressing  11/27/2024 2205 by Meena Gray RN  Outcome: Progressing     Problem: Safety - Adult  Goal: Free from fall injury  11/28/2024 1027 by Raegan Phelps RN  Outcome: Progressing  11/27/2024 2205 by Meena Gray RN  Outcome: Progressing     Problem: ABCDS Injury Assessment  Goal: Absence of physical injury  11/28/2024 1027 by Raegan Phelps RN  Outcome: Progressing  11/27/2024 2205 by Meena Gray RN  Outcome: Progressing  Flowsheets (Taken 11/27/2024 2100)  Absence of Physical Injury: Implement safety measures based on patient assessment     Problem: Confusion  Goal: Confusion, delirium, dementia, or psychosis is improved or at baseline  Description: INTERVENTIONS:  1. Assess for possible contributors to thought disturbance, including medications, impaired vision or hearing, underlying metabolic abnormalities, dehydration, psychiatric diagnoses, and notify attending LIP  2. [place] high risk fall precautions, as indicated  3. Provide frequent short contacts to provide reality reorientation, refocusing and direction  4. Decrease environmental stimuli, including noise as appropriate  5. Monitor and intervene to maintain adequate nutrition, hydration, elimination, sleep and activity  6. If unable to ensure safety without constant attention obtain sitter and review sitter guidelines with assigned personnel  7. Initiate Psychosocial CNS and Spiritual Care consult, as indicated  11/28/2024 1027 by Raegan Phelps RN  Outcome: 
  Problem: Skin/Tissue Integrity  Goal: Absence of new skin breakdown  Description: 1.  Monitor for areas of redness and/or skin breakdown  2.  Assess vascular access sites hourly  3.  Every 4-6 hours minimum:  Change oxygen saturation probe site  4.  Every 4-6 hours:  If on nasal continuous positive airway pressure, respiratory therapy assess nares and determine need for appliance change or resting period.  11/29/2024 1046 by Jennifer Ghosh RN  Outcome: Progressing  11/28/2024 2243 by iKngston Medina RN  Outcome: Progressing     Problem: Safety - Adult  Goal: Free from fall injury  11/29/2024 1046 by Jennifer Ghosh RN  Outcome: Progressing  Flowsheets (Taken 11/29/2024 1046)  Free From Fall Injury:   Instruct family/caregiver on patient safety   Based on caregiver fall risk screen, instruct family/caregiver to ask for assistance with transferring infant if caregiver noted to have fall risk factors  11/28/2024 2243 by [place] Medina RN  Outcome: Progressing     Problem: ABCDS Injury Assessment  Goal: Absence of physical injury  11/29/2024 1046 by Jennifer Ghosh RN  Outcome: Progressing  Flowsheets (Taken 11/29/2024 1046)  Absence of Physical Injury: Implement safety measures based on patient assessment  11/28/2024 2243 by Kingston Medina RN  Outcome: Progressing  Flowsheets (Taken 11/28/2024 2243)  Absence of Physical Injury: Implement safety measures based on patient assessment     Problem: Confusion  Goal: Confusion, delirium, dementia, or psychosis is improved or at baseline  Description: INTERVENTIONS:  1. Assess for possible contributors to thought disturbance, including medications, impaired vision or hearing, underlying metabolic abnormalities, dehydration, psychiatric diagnoses, and notify attending LIP  2. [place] high risk fall precautions, as indicated  3. Provide frequent short contacts to provide reality reorientation, refocusing and direction  4. Decrease environmental stimuli, including noise as 
  Problem: Skin/Tissue Integrity  Goal: Absence of new skin breakdown  Description: 1.  Monitor for areas of redness and/or skin breakdown  2.  Assess vascular access sites hourly  3.  Every 4-6 hours minimum:  Change oxygen saturation probe site  4.  Every 4-6 hours:  If on nasal continuous positive airway pressure, respiratory therapy assess nares and determine need for appliance change or resting period.  11/29/2024 1444 by Jennifer Ghosh RN  Outcome: Adequate for Discharge  11/29/2024 1046 by Jennifer Ghosh RN  Outcome: Progressing     Problem: Safety - Adult  Goal: Free from fall injury  11/29/2024 1444 by Jennifer Ghosh RN  Outcome: Adequate for Discharge  11/29/2024 1046 by Jennifer Ghosh RN  Outcome: Progressing  Flowsheets (Taken 11/29/2024 1046)  Free From Fall Injury:   Instruct family/caregiver on patient safety   Based on caregiver fall risk screen, instruct family/caregiver to ask for assistance with transferring infant if caregiver noted to have fall risk factors     Problem: ABCDS Injury Assessment  Goal: Absence of physical injury  11/29/2024 1444 by Jennifer Ghosh RN  Outcome: Adequate for Discharge  11/29/2024 1046 by Jennifer Ghosh RN  Outcome: Progressing  Flowsheets (Taken 11/29/2024 1046)  Absence of Physical Injury: Implement safety measures based on patient assessment     Problem: Confusion  Goal: Confusion, delirium, dementia, or psychosis is improved or at baseline  Description: INTERVENTIONS:  1. Assess for possible contributors to thought disturbance, including medications, impaired vision or hearing, underlying metabolic abnormalities, dehydration, psychiatric diagnoses, and notify attending LIP  2. [place] high risk fall precautions, as indicated  3. Provide frequent short contacts to provide reality reorientation, refocusing and direction  4. Decrease environmental stimuli, including noise as appropriate  5. Monitor and intervene to maintain adequate nutrition, hydration, 
  Problem: Skin/Tissue Integrity  Goal: Absence of new skin breakdown  Description: 1.  Monitor for areas of redness and/or skin breakdown  2.  Assess vascular access sites hourly  3.  Every 4-6 hours minimum:  Change oxygen saturation probe site  4.  Every 4-6 hours:  If on nasal continuous positive airway pressure, respiratory therapy assess nares and determine need for appliance change or resting period.  Outcome: Progressing     Problem: Safety - Adult  Goal: Free from fall injury  Outcome: Progressing     Problem: ABCDS Injury Assessment  Goal: Absence of physical injury  Outcome: Progressing     Problem: Confusion  Goal: Confusion, delirium, dementia, or psychosis is improved or at baseline  Description: INTERVENTIONS:  1. Assess for possible contributors to thought disturbance, including medications, impaired vision or hearing, underlying metabolic abnormalities, dehydration, psychiatric diagnoses, and notify attending LIP  2. [place] high risk fall precautions, as indicated  3. Provide frequent short contacts to provide reality reorientation, refocusing and direction  4. Decrease environmental stimuli, including noise as appropriate  5. Monitor and intervene to maintain adequate nutrition, hydration, elimination, sleep and activity  6. If unable to ensure safety without constant attention obtain sitter and review sitter guidelines with assigned personnel  7. Initiate Psychosocial CNS and Spiritual Care consult, as indicated  Outcome: Progressing     
  Problem: Skin/Tissue Integrity  Goal: Absence of new skin breakdown  Description: 1.  Monitor for areas of redness and/or skin breakdown  2.  Assess vascular access sites hourly  3.  Every 4-6 hours minimum:  Change oxygen saturation probe site  4.  Every 4-6 hours:  If on nasal continuous positive airway pressure, respiratory therapy assess nares and determine need for appliance change or resting period.  Outcome: Progressing     Problem: Safety - Adult  Goal: Free from fall injury  Outcome: Progressing     Problem: ABCDS Injury Assessment  Goal: Absence of physical injury  Outcome: Progressing     Problem: Pain  Goal: Verbalizes/displays adequate comfort level or baseline comfort level  Outcome: Completed     
  Problem: Skin/Tissue Integrity  Goal: Absence of new skin breakdown  Description: 1.  Monitor for areas of redness and/or skin breakdown  2.  Assess vascular access sites hourly  3.  Every 4-6 hours minimum:  Change oxygen saturation probe site  4.  Every 4-6 hours:  If on nasal continuous positive airway pressure, respiratory therapy assess nares and determine need for appliance change or resting period.  Outcome: Progressing     Problem: Safety - Adult  Goal: Free from fall injury  Outcome: Progressing  Flowsheets (Taken 11/24/2024 1049)  Free From Fall Injury: Instruct family/caregiver on patient safety     Problem: ABCDS Injury Assessment  Goal: Absence of physical injury  Outcome: Progressing  Flowsheets (Taken 11/24/2024 1049)  Absence of Physical Injury: Implement safety measures based on patient assessment     Problem: Confusion  Goal: Confusion, delirium, dementia, or psychosis is improved or at baseline  Description: INTERVENTIONS:  1. Assess for possible contributors to thought disturbance, including medications, impaired vision or hearing, underlying metabolic abnormalities, dehydration, psychiatric diagnoses, and notify attending LIP  2. [place] high risk fall precautions, as indicated  3. Provide frequent short contacts to provide reality reorientation, refocusing and direction  4. Decrease environmental stimuli, including noise as appropriate  5. Monitor and intervene to maintain adequate nutrition, hydration, elimination, sleep and activity  6. If unable to ensure safety without constant attention obtain sitter and review sitter guidelines with assigned personnel  7. Initiate Psychosocial CNS and Spiritual Care consult, as indicated  Outcome: Progressing  Flowsheets (Taken 11/23/2024 1038 by Rodríguez Ahmadi RN)  Effect of thought disturbance (confusion, delirium, dementia, or psychosis) are managed with adequate functional status:   [place] high risk fall precautions, as indicated   Provide 
  Problem: Skin/Tissue Integrity  Goal: Absence of new skin breakdown  Description: 1.  Monitor for areas of redness and/or skin breakdown  2.  Assess vascular access sites hourly  3.  Every 4-6 hours minimum:  Change oxygen saturation probe site  4.  Every 4-6 hours:  If on nasal continuous positive airway pressure, respiratory therapy assess nares and determine need for appliance change or resting period.  Outcome: Progressing     Problem: Safety - Adult  Goal: Free from fall injury  Outcome: Progressing  Flowsheets (Taken 11/27/2024 0449)  Free From Fall Injury:   Instruct family/caregiver on patient safety   Based on caregiver fall risk screen, instruct family/caregiver to ask for assistance with transferring infant if caregiver noted to have fall risk factors     Problem: ABCDS Injury Assessment  Goal: Absence of physical injury  Outcome: Progressing  Flowsheets (Taken 11/27/2024 0449)  Absence of Physical Injury: Implement safety measures based on patient assessment     Problem: Confusion  Goal: Confusion, delirium, dementia, or psychosis is improved or at baseline  Description: INTERVENTIONS:  1. Assess for possible contributors to thought disturbance, including medications, impaired vision or hearing, underlying metabolic abnormalities, dehydration, psychiatric diagnoses, and notify attending LIP  2. [place] high risk fall precautions, as indicated  3. Provide frequent short contacts to provide reality reorientation, refocusing and direction  4. Decrease environmental stimuli, including noise as appropriate  5. Monitor and intervene to maintain adequate nutrition, hydration, elimination, sleep and activity  6. If unable to ensure safety without constant attention obtain sitter and review sitter guidelines with assigned personnel  7. Initiate Psychosocial CNS and Spiritual Care consult, as indicated  Outcome: Progressing  Flowsheets (Taken 11/26/2024 2011)  Effect of thought disturbance (confusion, 
  Problem: Skin/Tissue Integrity  Goal: Absence of new skin breakdown  Outcome: [facility] Progressing     Problem: Safety - Adult  Goal: Free from fall injury  Outcome: [facility] Progressing     Problem: ABCDS Injury Assessment  Goal: Absence of physical injury  Outcome: [facility] Progressing     Problem: Confusion  Goal: Confusion, delirium, dementia, or psychosis is improved or at baseline  Outcome: [facility] Progressing     
Increase function to baseline.   
Increase patients ADLs/functional status to baseline.    
Urology Plan of Care  Patient with small volume voids only and PVRs of 418 mL and then 570 mL. [place] should be replaced and he can follow up with us as an outpatient for further management.    We will sign off, please call with questions should they arise.    Wyatt Ramon MD  Urology     
frequent short contacts to provide reality reorientation, refocusing and direction   Decrease environmental stimuli, including noise as appropriate   Monitor and intervene to maintain adequate nutrition, hydration, elimination, sleep and activity

## 2024-11-29 NOTE — PROGRESS NOTES
Department of Internal Medicine  Nephrology Progress Note        SUBJECTIVE:    We are following this patient for A/CKD.  The patient was seen and examined; he feels well today with no CP, SOB, nausea or vomiting.    ROS: No fever or chills.  Social: No family at bedside.    Physical Exam:    VITALS:  BP (!) 146/66   Pulse 87   Temp 97.7 °F (36.5 °C) (Oral)   Resp 16   Ht 1.803 m (5' 11\")   Wt 62.4 kg (137 lb 9.6 oz)   SpO2 97%   BMI 19.19 kg/m²     General appearance: Seems comfortable, no acute distress.  Neck: Trachea midline, thyroid normal.   Lungs:  Non labored breathing, CTA to anterior auscultation.  Heart:  S1S2 normal, rub or gallop. No peripheral edema.  Abdomen: Soft, non-tender, no organomegaly.   Skin: No lesions or rashes, warm to touch.     DATA:    CBC with Differential:    Lab Results   Component Value Date/Time    WBC 6.1 11/27/2024 04:36 AM    RBC 2.36 11/27/2024 04:36 AM    HGB 7.5 11/27/2024 04:36 AM    HCT 23.0 11/27/2024 04:36 AM     11/27/2024 04:36 AM    MCV 97.4 11/27/2024 04:36 AM    MCH 31.8 11/27/2024 04:36 AM    MCHC 32.6 11/27/2024 04:36 AM    RDW 15.8 11/27/2024 04:36 AM    LYMPHOPCT 35.8 11/24/2024 09:21 AM    MONOPCT 4.6 11/24/2024 09:21 AM    EOSPCT 2.4 11/24/2024 09:21 AM    BASOPCT 0.3 11/24/2024 09:21 AM    MONOSABS 0.3 11/24/2024 09:21 AM    LYMPHSABS 2.0 11/24/2024 09:21 AM    EOSABS 0.1 11/24/2024 09:21 AM    BASOSABS 0.0 11/24/2024 09:21 AM     BMP:    Lab Results   Component Value Date/Time     11/29/2024 04:30 AM    K 4.1 11/29/2024 04:30 AM    K 4.1 11/28/2024 05:09 AM     11/29/2024 04:30 AM    CO2 22 11/29/2024 04:30 AM    BUN 37 11/29/2024 04:30 AM    CREATININE 1.8 11/29/2024 04:30 AM    CALCIUM 7.7 11/29/2024 04:30 AM    GFRAA >60 02/18/2019 07:23 PM    LABGLOM 34 11/29/2024 04:30 AM    GLUCOSE 100 11/29/2024 04:30 AM       Assessment     AURELIO on CKD 3  Likely in setting of recurring urinary retention, and volume depletion  BCR around 1.3.   Worsened to 2.2 this admission.  It was improving to 1.8 with a Winslow catheter and since it was removed it has worsened to 2.0      Anemia :   -Hemoglobin down to 7.5      Hypertension :  -On hydralazine 3 times daily     Obstructive uropathy/BPH  -Repeat ultrasound yesterday without any hydronephrosis        Plan:     -Winslow catheter replaced per Urology.    -Discontinue IV fluids for now    -Monitor serial labs

## 2024-11-29 NOTE — PROGRESS NOTES
Occupational Therapy  Attempted OT tx. Pt is receiving bath by PCA. Cont OT.  Raven Hutchison OTR/L

## 2024-11-29 NOTE — PROGRESS NOTES
Comprehensive Nutrition Assessment    Type and Reason for Visit:  Initial, LOS    Nutrition Recommendations/Plan:   Continue Cardiac Diet - recommend liberalization if PO intakes decline.   Encourage PO intakes.  Ensure Plus HP BID.  Monitor pertinent labs, bowel habits, weight, N/V, supplement acceptance, clinical progression.       Malnutrition Assessment:  Malnutrition Status:  At risk for malnutrition (11/29/24 0910)    Context:  Acute Illness     Findings of the 6 clinical characteristics of malnutrition:  Energy Intake:  No decrease in energy intake  Weight Loss:  Mild weight loss (wt varies 134-154lbs)     Body Fat Loss:  Unable to assess     Muscle Mass Loss:  Unable to assess    Fluid Accumulation:  No fluid accumulation     Strength:  Not Performed    Nutrition Assessment:    Patient seen due to LOS. Admitted for AURELIO, UTI, Hemorrhagic cysts, anemia. PMHx of atrial fibrillation, essential hypertension, chronic kidney disease, dyslipidemia. Attempted to call pt x2, no answer. Pt on cardiac diet, consuming % of meals per flowsheets. No N/V. Noted weights fluctuate from ~134-154lbs per weight history. Will send Ensure BID to trial. Pt expected to discharge to SNF. Will continue to monitor.    Nutrition Related Findings:    BUN 37, creatinine 1.8, GFR 34, Mg 1.78, BS , Ca 7.7. LBM 11/24? Wound Type: None       Current Nutrition Intake & Therapies:    Average Meal Intake: 51-75%, %  Average Supplements Intake: None Ordered  ADULT DIET; Regular; Low Fat/Low Chol/High Fiber/2 gm Na    Anthropometric Measures:  Height: 180.3 cm (5' 11\")  Ideal Body Weight (IBW): 172 lbs (78 kg)    Admission Body Weight: 60.6 kg (133 lb 9.6 oz) (11/21/24)  Current Body Weight: 62.4 kg (137 lb 9.1 oz) (11/29/24), 80 % IBW. Weight Source: Bed scale  Current BMI (kg/m2): 19.2        Weight Adjustment For: No Adjustment        BMI Categories: Underweight (BMI less than 22) age over 65    Estimated Daily Nutrient

## 2024-12-01 NOTE — PROGRESS NOTES
Physician Progress Note      PATIENT:               MIKE DEVLIN  [facility] #:                  [id number]  :                       1929  ADMIT DATE:       2024 2:46 PM  DISCH DATE:        2024 5:30 PM  RESPONDING  PROVIDER #:        RBEECCA TREJO          QUERY TEXT:    Patient admitted with UTI. Noted documentation of Metabolic encephalopathy in   IM PN on  with suspect he is at baseline. In order to support the   diagnosis of Metabolic encephalopathy, please include additional clinical   indicators in your documentation.  Or please document if the diagnosis of   Metabolic encephalopathy has been ruled out after further study.    The medical record reflects the following:  Risk Factors: UTI, AURELIO    Clinical Indicators:  ED Provider note  \"Increase in confusion,  They   report upon arrival pt GCS 15 and report abnormal labs;  He is oriented x 3   states he feels well and at his baseline\".    H&P  \"Chief Admission Complaint: Alteration in mental status; complaints   of mental status changes that started yesterday. No apparent distress, appears   stated age and cooperative.    Urology consult  \"Alert and forgetful, cooperative, no distress, appears   stated age\".    Nephrology consult  \"Alert and oriented to self and place\".    IM PN  \"Metabolic encephalopathy, Psychiatric:  Alert and oriented\".    IM PN  \"Metabolic encephalopathy: suspect dementia at baseline, perhaps   worsening in the setting of UTI, cont supportive suspect he is at baseline\"    Treatment: IV Rocephin, IVF,    Thank you  CODY Markham CDS  Options provided:  -- Metabolic encephalopathy present as evidenced by, Please document evidence.  -- Metabolic encephalopathy was ruled out  -- Other - I will add my own diagnosis  -- Disagree - Not applicable / Not valid  -- Disagree - Clinically unable to determine / Unknown  -- Refer to Clinical Documentation Reviewer    PROVIDER RESPONSE

## 2024-12-21 PROBLEM — N39.0 UTI (URINARY TRACT INFECTION): Status: RESOLVED | Noted: 2024-11-21 | Resolved: 2024-12-21

## 2025-02-21 ENCOUNTER — APPOINTMENT (OUTPATIENT)
Dept: GENERAL RADIOLOGY | Age: 89
DRG: 603 | End: 2025-02-21
Payer: OTHER GOVERNMENT

## 2025-02-21 ENCOUNTER — HOSPITAL ENCOUNTER (INPATIENT)
Age: 89
LOS: 5 days | Discharge: NURSING, REHAB OR LONG-TERM CARE | DRG: 603 | End: 2025-02-26
Attending: EMERGENCY MEDICINE | Admitting: INTERNAL MEDICINE
Payer: OTHER GOVERNMENT

## 2025-02-21 ENCOUNTER — APPOINTMENT (OUTPATIENT)
Dept: CT IMAGING | Age: 89
DRG: 603 | End: 2025-02-21
Payer: OTHER GOVERNMENT

## 2025-02-21 DIAGNOSIS — N18.9 CHRONIC RENAL IMPAIRMENT, UNSPECIFIED CKD STAGE: ICD-10-CM

## 2025-02-21 DIAGNOSIS — L03.114 CELLULITIS OF LEFT UPPER EXTREMITY: Primary | ICD-10-CM

## 2025-02-21 LAB
ALBUMIN SERPL-MCNC: 3.7 G/DL (ref 3.4–5)
ALBUMIN/GLOB SERPL: 1.2 {RATIO} (ref 1.1–2.2)
ALP SERPL-CCNC: 95 U/L (ref 40–129)
ALT SERPL-CCNC: 8 U/L (ref 10–40)
ANION GAP SERPL CALCULATED.3IONS-SCNC: 13 MMOL/L (ref 3–16)
AST SERPL-CCNC: 24 U/L (ref 15–37)
BASOPHILS # BLD: 0 K/UL (ref 0–0.2)
BASOPHILS NFR BLD: 0.2 %
BILIRUB SERPL-MCNC: 0.9 MG/DL (ref 0–1)
BUN SERPL-MCNC: 42 MG/DL (ref 7–20)
CALCIUM SERPL-MCNC: 9 MG/DL (ref 8.3–10.6)
CHLORIDE SERPL-SCNC: 105 MMOL/L (ref 99–110)
CO2 SERPL-SCNC: 20 MMOL/L (ref 21–32)
CREAT SERPL-MCNC: 2 MG/DL (ref 0.8–1.3)
CRP SERPL-MCNC: 62.1 MG/L (ref 0–5.1)
DEPRECATED RDW RBC AUTO: 14.2 % (ref 12.4–15.4)
EOSINOPHIL # BLD: 0 K/UL (ref 0–0.6)
EOSINOPHIL NFR BLD: 0.1 %
ERYTHROCYTE [SEDIMENTATION RATE] IN BLOOD BY WESTERGREN METHOD: 12 MM/HR (ref 0–20)
GFR SERPLBLD CREATININE-BSD FMLA CKD-EPI: 30 ML/MIN/{1.73_M2}
GLUCOSE SERPL-MCNC: 156 MG/DL (ref 70–99)
HCT VFR BLD AUTO: 33.5 % (ref 40.5–52.5)
HGB BLD-MCNC: 11 G/DL (ref 13.5–17.5)
LACTATE BLDV-SCNC: 1.4 MMOL/L (ref 0.4–1.9)
LYMPHOCYTES # BLD: 0.5 K/UL (ref 1–5.1)
LYMPHOCYTES NFR BLD: 6.5 %
MCH RBC QN AUTO: 32.8 PG (ref 26–34)
MCHC RBC AUTO-ENTMCNC: 32.9 G/DL (ref 31–36)
MCV RBC AUTO: 99.6 FL (ref 80–100)
MONOCYTES # BLD: 0.3 K/UL (ref 0–1.3)
MONOCYTES NFR BLD: 4.1 %
NEUTROPHILS # BLD: 7.3 K/UL (ref 1.7–7.7)
NEUTROPHILS NFR BLD: 89.1 %
PLATELET # BLD AUTO: 153 K/UL (ref 135–450)
PMV BLD AUTO: 8.5 FL (ref 5–10.5)
POTASSIUM SERPL-SCNC: 4.1 MMOL/L (ref 3.5–5.1)
PROT SERPL-MCNC: 6.9 G/DL (ref 6.4–8.2)
RBC # BLD AUTO: 3.36 M/UL (ref 4.2–5.9)
SODIUM SERPL-SCNC: 138 MMOL/L (ref 136–145)
URATE SERPL-MCNC: 8.5 MG/DL (ref 3.5–7.2)
WBC # BLD AUTO: 8.2 K/UL (ref 4–11)

## 2025-02-21 PROCEDURE — 99285 EMERGENCY DEPT VISIT HI MDM: CPT

## 2025-02-21 PROCEDURE — 1200000000 HC SEMI PRIVATE

## 2025-02-21 PROCEDURE — 85652 RBC SED RATE AUTOMATED: CPT

## 2025-02-21 PROCEDURE — 87641 MR-STAPH DNA AMP PROBE: CPT

## 2025-02-21 PROCEDURE — 6370000000 HC RX 637 (ALT 250 FOR IP): Performed by: NURSE PRACTITIONER

## 2025-02-21 PROCEDURE — 84550 ASSAY OF BLOOD/URIC ACID: CPT

## 2025-02-21 PROCEDURE — 2500000003 HC RX 250 WO HCPCS: Performed by: NURSE PRACTITIONER

## 2025-02-21 PROCEDURE — 6360000002 HC RX W HCPCS: Performed by: PHYSICIAN ASSISTANT

## 2025-02-21 PROCEDURE — 73200 CT UPPER EXTREMITY W/O DYE: CPT

## 2025-02-21 PROCEDURE — 87040 BLOOD CULTURE FOR BACTERIA: CPT

## 2025-02-21 PROCEDURE — 2580000003 HC RX 258: Performed by: PHYSICIAN ASSISTANT

## 2025-02-21 PROCEDURE — 73130 X-RAY EXAM OF HAND: CPT

## 2025-02-21 PROCEDURE — 83605 ASSAY OF LACTIC ACID: CPT

## 2025-02-21 PROCEDURE — 6360000002 HC RX W HCPCS: Performed by: NURSE PRACTITIONER

## 2025-02-21 PROCEDURE — 36415 COLL VENOUS BLD VENIPUNCTURE: CPT

## 2025-02-21 PROCEDURE — 86140 C-REACTIVE PROTEIN: CPT

## 2025-02-21 PROCEDURE — 96365 THER/PROPH/DIAG IV INF INIT: CPT

## 2025-02-21 PROCEDURE — 85025 COMPLETE CBC W/AUTO DIFF WBC: CPT

## 2025-02-21 PROCEDURE — 80053 COMPREHEN METABOLIC PANEL: CPT

## 2025-02-21 RX ORDER — VANCOMYCIN HCL IN 5 % DEXTROSE 1.25 G/25
1250 PLASTIC BAG, INJECTION (ML) INTRAVENOUS ONCE
Status: COMPLETED | OUTPATIENT
Start: 2025-02-21 | End: 2025-02-21

## 2025-02-21 RX ORDER — ENOXAPARIN SODIUM 100 MG/ML
30 INJECTION SUBCUTANEOUS DAILY
Status: DISCONTINUED | OUTPATIENT
Start: 2025-02-21 | End: 2025-02-26 | Stop reason: HOSPADM

## 2025-02-21 RX ORDER — ONDANSETRON 4 MG/1
4 TABLET, FILM COATED ORAL EVERY 4 HOURS PRN
COMMUNITY

## 2025-02-21 RX ORDER — SODIUM CHLORIDE 0.9 % (FLUSH) 0.9 %
10 SYRINGE (ML) INJECTION PRN
Status: DISCONTINUED | OUTPATIENT
Start: 2025-02-21 | End: 2025-02-26 | Stop reason: HOSPADM

## 2025-02-21 RX ORDER — SODIUM CHLORIDE 0.9 % (FLUSH) 0.9 %
10 SYRINGE (ML) INJECTION EVERY 12 HOURS SCHEDULED
Status: DISCONTINUED | OUTPATIENT
Start: 2025-02-21 | End: 2025-02-26 | Stop reason: HOSPADM

## 2025-02-21 RX ORDER — ATORVASTATIN CALCIUM 40 MG/1
40 TABLET, FILM COATED ORAL DAILY
Status: DISCONTINUED | OUTPATIENT
Start: 2025-02-21 | End: 2025-02-26 | Stop reason: HOSPADM

## 2025-02-21 RX ORDER — TRAMADOL HYDROCHLORIDE 50 MG/1
50 TABLET ORAL EVERY 12 HOURS PRN
Status: DISCONTINUED | OUTPATIENT
Start: 2025-02-21 | End: 2025-02-26 | Stop reason: HOSPADM

## 2025-02-21 RX ORDER — ASPIRIN 81 MG/1
81 TABLET ORAL DAILY
Status: DISCONTINUED | OUTPATIENT
Start: 2025-02-21 | End: 2025-02-26 | Stop reason: HOSPADM

## 2025-02-21 RX ORDER — LINEZOLID 600 MG/1
600 TABLET, FILM COATED ORAL 2 TIMES DAILY
Status: ON HOLD | COMMUNITY
End: 2025-02-26 | Stop reason: HOSPADM

## 2025-02-21 RX ORDER — ONDANSETRON 2 MG/ML
4 INJECTION INTRAMUSCULAR; INTRAVENOUS EVERY 6 HOURS PRN
Status: DISCONTINUED | OUTPATIENT
Start: 2025-02-21 | End: 2025-02-26 | Stop reason: HOSPADM

## 2025-02-21 RX ORDER — HYDRALAZINE HYDROCHLORIDE 25 MG/1
50 TABLET, FILM COATED ORAL 3 TIMES DAILY
Status: DISCONTINUED | OUTPATIENT
Start: 2025-02-21 | End: 2025-02-26 | Stop reason: HOSPADM

## 2025-02-21 RX ORDER — ONDANSETRON 4 MG/1
4 TABLET, ORALLY DISINTEGRATING ORAL EVERY 8 HOURS PRN
Status: DISCONTINUED | OUTPATIENT
Start: 2025-02-21 | End: 2025-02-26 | Stop reason: HOSPADM

## 2025-02-21 RX ORDER — SODIUM CHLORIDE 9 MG/ML
INJECTION, SOLUTION INTRAVENOUS PRN
Status: DISCONTINUED | OUTPATIENT
Start: 2025-02-21 | End: 2025-02-26 | Stop reason: HOSPADM

## 2025-02-21 RX ORDER — ACETAMINOPHEN 325 MG/1
650 TABLET ORAL EVERY 6 HOURS PRN
Status: DISCONTINUED | OUTPATIENT
Start: 2025-02-21 | End: 2025-02-23

## 2025-02-21 RX ORDER — TAMSULOSIN HYDROCHLORIDE 0.4 MG/1
0.4 CAPSULE ORAL DAILY
Status: DISCONTINUED | OUTPATIENT
Start: 2025-02-21 | End: 2025-02-26 | Stop reason: HOSPADM

## 2025-02-21 RX ORDER — ACETAMINOPHEN 650 MG/1
650 SUPPOSITORY RECTAL EVERY 6 HOURS PRN
Status: DISCONTINUED | OUTPATIENT
Start: 2025-02-21 | End: 2025-02-23

## 2025-02-21 RX ADMIN — Medication 10 ML: at 20:36

## 2025-02-21 RX ADMIN — VANCOMYCIN HYDROCHLORIDE 1250 MG: 1.25 INJECTION, SOLUTION INTRAVITREAL at 13:36

## 2025-02-21 RX ADMIN — ATORVASTATIN CALCIUM 40 MG: 40 TABLET, FILM COATED ORAL at 20:35

## 2025-02-21 RX ADMIN — ENOXAPARIN SODIUM 30 MG: 100 INJECTION SUBCUTANEOUS at 18:38

## 2025-02-21 RX ADMIN — HYDRALAZINE HYDROCHLORIDE 50 MG: 25 TABLET ORAL at 20:35

## 2025-02-21 RX ADMIN — CEFEPIME 2000 MG: 2 INJECTION, POWDER, FOR SOLUTION INTRAVENOUS at 12:48

## 2025-02-21 RX ADMIN — ACETAMINOPHEN 650 MG: 325 TABLET ORAL at 18:39

## 2025-02-21 RX ADMIN — TAMSULOSIN HYDROCHLORIDE 0.4 MG: 0.4 CAPSULE ORAL at 20:35

## 2025-02-21 ASSESSMENT — PAIN DESCRIPTION - ORIENTATION
ORIENTATION: LEFT
ORIENTATION: LEFT

## 2025-02-21 ASSESSMENT — PAIN DESCRIPTION - DESCRIPTORS
DESCRIPTORS: SHARP
DESCRIPTORS: STABBING;THROBBING

## 2025-02-21 ASSESSMENT — PAIN DESCRIPTION - LOCATION
LOCATION: ARM;HAND
LOCATION: ARM;HAND

## 2025-02-21 ASSESSMENT — PAIN SCALES - GENERAL
PAINLEVEL_OUTOF10: 0
PAINLEVEL_OUTOF10: 8

## 2025-02-21 ASSESSMENT — PAIN DESCRIPTION - PAIN TYPE: TYPE: ACUTE PAIN

## 2025-02-21 ASSESSMENT — PAIN DESCRIPTION - FREQUENCY: FREQUENCY: CONTINUOUS

## 2025-02-21 ASSESSMENT — PAIN - FUNCTIONAL ASSESSMENT: PAIN_FUNCTIONAL_ASSESSMENT: PREVENTS OR INTERFERES SOME ACTIVE ACTIVITIES AND ADLS

## 2025-02-21 NOTE — ED PROVIDER NOTES
Emergency Department Attending SUPERVISORY Provider Note  Location: [facility] EMERGENCY DEPARTMENT  2/21/2025     Chief Complaint   Patient presents with    Hand Pain     Brought by EMS from [facility] for left arm swelling for weeks, noted with redness and warm to touch. Denies any history of trauma        PATIENT ID:  Javier De La Torre is a 89 y.o. male    Past Medical History:   Diagnosis Date    Hyperlipidemia     Hypertension     Urinary retention        Javier De La Torre was evaluated in the Emergency Department for concerns of failed outpatient management of left arm cellulitis, pictured below.  His hand is quite puffy and edematous, and goes all the way up to his elbow, but patient says he can even feel it to his shoulder.  Patient is quite jovial, interactive, and in no acute distress on arrival to the ER.  He had already gotten Tylenol before I evaluate him, and he says he feels significantly better.       Vitals:    02/21/25 1215   BP: (!) 155/78   Pulse: 89   Resp: 23   Temp:    SpO2: 99%        BASIC EXAM:    Focused exam revealed   General: Alert, no acute distress, patient resting comfortably   Skin: Warm, intact, no pallor noted   Head: Normocephalic  Eye: Normal conjunctiva   Cardiac: Normal peripheral perfusion  Respiratory: No acute distress   Musculoskeletal: No deformity, full ROM.  Quite the erythema and swelling to the left lower arm.  Distal pulse intact.  Supination pronation intact..  Please see photo below, quite concerning for cellulitic changes.  Neurological: Alert and oriented  Psychiatric: Cooperative                      I independently interpreted the Xray: No obvious deformities, dislocations, or fractures      XR HAND LEFT (MIN 3 VIEWS)   Final Result   Generalized soft tissue swelling. Cellulitis not excluded.      Electronically signed by Shabnam Lees          Labs Reviewed   CBC WITH AUTO DIFFERENTIAL - Abnormal; Notable for the following components:       Result Value    
daily       ALLERGIES:    Amlodipine, Hydrochlorothiazide, Terazosin hcl, and Zetia [ezetimibe]    FAMILY HISTORY:     @FAMX    SOCIAL HISTORY:       Social History     Socioeconomic History    Marital status:      Spouse name: None    Number of children: None    Years of education: None    Highest education level: None   Tobacco Use    Smoking status: Never    Smokeless tobacco: Never   Vaping Use    Vaping status: Never Used   Substance and Sexual Activity    Alcohol use: No    Drug use: No     Social Determinants of Health     Food Insecurity: Food Insecurity Present (11/21/2024)    Hunger Vital Sign     Worried About Running Out of Food in the Last Year: Never true     Ran Out of Food in the Last Year: Sometimes true   Transportation Needs: Unmet Transportation Needs (11/21/2024)    PRAPARE - Transportation     Lack of Transportation (Medical): No     Lack of Transportation (Non-Medical): Yes   Housing Stability: Low Risk  (11/21/2024)    Housing Stability Vital Sign     Unable to Pay for Housing in the Last Year: No     Number of Times Moved in the Last Year: 0     Homeless in the Last Year: No       SCREENINGS:    H[place] Coma Scale  Eye Opening: Spontaneous  Best Verbal Response: Oriented  Best Motor Response: Obeys commands  H[place]ton Coma Scale Score: 15      CIWA Assessment  BP: (!) 143/75  Pulse: 84        PHYSICAL EXAM:       ED Triage Vitals [02/21/25 1113]   BP Systolic BP Percentile Diastolic BP Percentile Temp Temp src Pulse Respirations SpO2   (!) 145/69 -- -- 99.3 °F (37.4 °C) -- 88 20 97 %      Height Weight - Scale         -- 64.9 kg (143 lb)             Physical Exam    CONSTITUTIONAL: Awake and alert. Cooperative. Well-developed. Well-nourished. Non-toxic. No acute distress.  HENT: Normocephalic. Atraumatic. External ears normal, without discharge. No nasal discharge. Oropharynx clear. Mucous membranes moist.  EYES: Conjunctiva non-injected. No scleral icterus. PERRL.     NECK: Supple.

## 2025-02-21 NOTE — H&P
[facility] Medicine History & Physical    V 1.6    Date of Admission: 2/21/2025    Date of Service:  Pt seen/examined on 02/21/25    [x]Admitted to Inpatient with expected LOS greater than two midnights due to medical therapy.  []Placed in Observation status.    Chief Admission Complaint:    Chief Complaint   Patient presents with    Hand Pain     Brought by EMS from [facility] for left arm swelling for weeks, noted with redness and warm to touch. Denies any history of trauma     Presenting Admission History:      89 y.o. male who presented to [facility] with left hand pain.  PMHx significant for HTN, HLD, urinary retention.  History was obtained from the patient and review of the EMR.  The patient states that over the past week, he has been having left hand swelling.  It has progressively worsened over the past few days.  He has been taking antibiotics at his facility for this (linezolid) over the past few days due to concern for cellulitis.  As it kept getting worse, they decided to bring him to the hospital for further evaluation.    Left hand x-ray with generalized soft tissue swelling.  Sedimentation rate of 12, CRP 62.1, uric acid 8.5.  The patient states that he has had what was thought to be gout in the past though only in one of his feet.  He states that he was given medication and it went away and has never had issues since.  He will be admitted for further evaluation.    I discussed CODE STATUS with the patient and he confirms FULL CODE.     Assessment/Plan:      Current Principal Problem:  Cellulitis of left hand    Left hand swelling with erythema, unclear etiology at this point. Ddx: cellulitis, gout flare, rheumatoid arthritis  - Pt states that he was on an antibiotic (zyvox x 2 doses) as outpatient and it did not improve  - Left hand x-ray with generalized soft tissue swelling  - Sed rate 12, CRP 62.1, uric acid 8.5 on admission  - IV vancomycin for now pending further workup  -

## 2025-02-21 NOTE — ED NOTES
Javier De La Torre is a 89 y.o. male admitted for  Principal Problem:    Cellulitis of left hand  Resolved Problems:    * No resolved hospital problems. *  .   Patient Assisted Living via EMS transportation with   Chief Complaint   Patient presents with    Hand Pain     Brought by EMS from [facility] for left arm swelling for weeks, noted with redness and warm to touch. Denies any history of trauma   .  Patient is alert and Person, Place, Time, and Situation  Patient's baseline mobility: Baseline Mobility: Independent   Code Status: Prior   Cardiac Rhythm:       Is patient on baseline Oxygen: no how many Liters:   Abnormal Assessment Findings:     Isolation: None      NIH Score:    C-SSRS: Risk of Suicide: No Risk  Bedside swallow:        Active LDA's:   Peripheral IV 02/21/25 Right Forearm (Active)   Site Assessment Clean, dry & intact 02/21/25 1124   Line Status Blood return noted;Flushed 02/21/25 1124   Phlebitis Assessment No symptoms 02/21/25 1124   Infiltration Assessment 0 02/21/25 1124   Dressing Status New dressing applied 02/21/25 1124   Dressing Type Transparent 02/21/25 1124   Dressing Intervention New 02/21/25 1124     Patient admitted with a ferrell: yes,  If the ferrell is chronic was it exchanged:Yes 02-  Reason for ferrell: Chronic   Patient admitted with Central Line:  . PICC line placement confirmed: YES OR NO:[id number]}   Reason for Central line:   Was central line Inserted from an outside facility:        Family/Caregiver Present  Any Concerns: no   Restraints no  Sitter no         Vitals: MEWS Score: 2    Vitals:    02/21/25 1113 02/21/25 1215 02/21/25 1316 02/21/25 1347   BP: (!) 145/69 (!) 155/78 (!) 143/75 (!) 159/77   Pulse: 88 89 84 83   Resp: 20 23 23 29   Temp: 99.3 °F (37.4 °C) 99 °F (37.2 °C)     TempSrc:  Oral     SpO2: 97% 99% 96% 94%   Weight: 64.9 kg (143 lb)          Last documented pain score (0-10 scale)    Pain medication administered No.    Pertinent or High Risk

## 2025-02-21 NOTE — ED NOTES
Called [facility] and spoke with the nurse for this patient, stated that the hand swelling was noted on February 18, 2025 they did some lab works, D dimer is elevated. Duplex ultrasound was done and it was negative. No history of fever or trauma. Started on Zyvox 600 mg tablet twice a day yesterday. Patient is also on chronic ferrell catheter, last changed on February 14, 2025. Nicolas DYKES informed.

## 2025-02-22 LAB
ANION GAP SERPL CALCULATED.3IONS-SCNC: 9 MMOL/L (ref 3–16)
BASOPHILS # BLD: 0 K/UL (ref 0–0.2)
BASOPHILS NFR BLD: 0.4 %
BUN SERPL-MCNC: 38 MG/DL (ref 7–20)
CALCIUM SERPL-MCNC: 8.7 MG/DL (ref 8.3–10.6)
CHLORIDE SERPL-SCNC: 106 MMOL/L (ref 99–110)
CO2 SERPL-SCNC: 24 MMOL/L (ref 21–32)
CREAT SERPL-MCNC: 1.9 MG/DL (ref 0.8–1.3)
DEPRECATED RDW RBC AUTO: 13.2 % (ref 12.4–15.4)
EOSINOPHIL # BLD: 0.1 K/UL (ref 0–0.6)
EOSINOPHIL NFR BLD: 1.3 %
GFR SERPLBLD CREATININE-BSD FMLA CKD-EPI: 32 ML/MIN/{1.73_M2}
GLUCOSE SERPL-MCNC: 93 MG/DL (ref 70–99)
HCT VFR BLD AUTO: 28.9 % (ref 40.5–52.5)
HGB BLD-MCNC: 9.9 G/DL (ref 13.5–17.5)
LYMPHOCYTES # BLD: 0.9 K/UL (ref 1–5.1)
LYMPHOCYTES NFR BLD: 21.7 %
MCH RBC QN AUTO: 33.2 PG (ref 26–34)
MCHC RBC AUTO-ENTMCNC: 34.2 G/DL (ref 31–36)
MCV RBC AUTO: 97.1 FL (ref 80–100)
MONOCYTES # BLD: 0.6 K/UL (ref 0–1.3)
MONOCYTES NFR BLD: 14.4 %
MRSA DNA SPEC QL NAA+PROBE: NORMAL
NEUTROPHILS # BLD: 2.5 K/UL (ref 1.7–7.7)
NEUTROPHILS NFR BLD: 62.2 %
PLATELET # BLD AUTO: 132 K/UL (ref 135–450)
PMV BLD AUTO: 8.8 FL (ref 5–10.5)
POTASSIUM SERPL-SCNC: 3.9 MMOL/L (ref 3.5–5.1)
RBC # BLD AUTO: 2.98 M/UL (ref 4.2–5.9)
SODIUM SERPL-SCNC: 139 MMOL/L (ref 136–145)
VANCOMYCIN SERPL-MCNC: 9.7 UG/ML
WBC # BLD AUTO: 4.1 K/UL (ref 4–11)

## 2025-02-22 PROCEDURE — 80202 ASSAY OF VANCOMYCIN: CPT

## 2025-02-22 PROCEDURE — 6370000000 HC RX 637 (ALT 250 FOR IP): Performed by: NURSE PRACTITIONER

## 2025-02-22 PROCEDURE — 2500000003 HC RX 250 WO HCPCS: Performed by: NURSE PRACTITIONER

## 2025-02-22 PROCEDURE — 1200000000 HC SEMI PRIVATE

## 2025-02-22 PROCEDURE — 36415 COLL VENOUS BLD VENIPUNCTURE: CPT

## 2025-02-22 PROCEDURE — 2580000003 HC RX 258: Performed by: INTERNAL MEDICINE

## 2025-02-22 PROCEDURE — 85025 COMPLETE CBC W/AUTO DIFF WBC: CPT

## 2025-02-22 PROCEDURE — 6360000002 HC RX W HCPCS: Performed by: INTERNAL MEDICINE

## 2025-02-22 PROCEDURE — 6360000002 HC RX W HCPCS: Performed by: NURSE PRACTITIONER

## 2025-02-22 PROCEDURE — 80048 BASIC METABOLIC PNL TOTAL CA: CPT

## 2025-02-22 RX ADMIN — ACETAMINOPHEN 650 MG: 325 TABLET ORAL at 20:26

## 2025-02-22 RX ADMIN — HYDRALAZINE HYDROCHLORIDE 50 MG: 25 TABLET ORAL at 15:33

## 2025-02-22 RX ADMIN — ATORVASTATIN CALCIUM 40 MG: 40 TABLET, FILM COATED ORAL at 20:00

## 2025-02-22 RX ADMIN — HYDRALAZINE HYDROCHLORIDE 50 MG: 25 TABLET ORAL at 11:01

## 2025-02-22 RX ADMIN — VANCOMYCIN HYDROCHLORIDE 500 MG: 500 INJECTION, POWDER, LYOPHILIZED, FOR SOLUTION INTRAVENOUS at 08:32

## 2025-02-22 RX ADMIN — Medication 10 ML: at 20:28

## 2025-02-22 RX ADMIN — TRAMADOL HYDROCHLORIDE 50 MG: 50 TABLET, COATED ORAL at 11:01

## 2025-02-22 RX ADMIN — TAMSULOSIN HYDROCHLORIDE 0.4 MG: 0.4 CAPSULE ORAL at 20:00

## 2025-02-22 RX ADMIN — HYDRALAZINE HYDROCHLORIDE 50 MG: 25 TABLET ORAL at 20:00

## 2025-02-22 RX ADMIN — ASPIRIN 81 MG: 81 TABLET, COATED ORAL at 11:01

## 2025-02-22 RX ADMIN — ENOXAPARIN SODIUM 30 MG: 100 INJECTION SUBCUTANEOUS at 11:00

## 2025-02-22 RX ADMIN — Medication 10 ML: at 08:33

## 2025-02-22 ASSESSMENT — PAIN SCALES - GENERAL
PAINLEVEL_OUTOF10: 4
PAINLEVEL_OUTOF10: 3
PAINLEVEL_OUTOF10: 0
PAINLEVEL_OUTOF10: 3
PAINLEVEL_OUTOF10: 3

## 2025-02-22 ASSESSMENT — PAIN DESCRIPTION - ORIENTATION: ORIENTATION: LEFT

## 2025-02-22 ASSESSMENT — PAIN DESCRIPTION - LOCATION
LOCATION: HAND
LOCATION: HAND

## 2025-02-22 ASSESSMENT — PAIN DESCRIPTION - DESCRIPTORS: DESCRIPTORS: ACHING

## 2025-02-22 NOTE — ACP (ADVANCE CARE PLANNING)
visited with patient and discussed AD's - patient was not aware of requesting AD's, but did confirm decision makers - contact information updated to reflect information received from patient.

## 2025-02-23 LAB
ANION GAP SERPL CALCULATED.3IONS-SCNC: 9 MMOL/L (ref 3–16)
BASOPHILS # BLD: 0 K/UL (ref 0–0.2)
BASOPHILS NFR BLD: 0.3 %
BUN SERPL-MCNC: 37 MG/DL (ref 7–20)
CALCIUM SERPL-MCNC: 8.6 MG/DL (ref 8.3–10.6)
CHLORIDE SERPL-SCNC: 105 MMOL/L (ref 99–110)
CO2 SERPL-SCNC: 24 MMOL/L (ref 21–32)
CREAT SERPL-MCNC: 1.9 MG/DL (ref 0.8–1.3)
DEPRECATED RDW RBC AUTO: 13.5 % (ref 12.4–15.4)
EOSINOPHIL # BLD: 0.1 K/UL (ref 0–0.6)
EOSINOPHIL NFR BLD: 1.5 %
GFR SERPLBLD CREATININE-BSD FMLA CKD-EPI: 32 ML/MIN/{1.73_M2}
GLUCOSE SERPL-MCNC: 109 MG/DL (ref 70–99)
HCT VFR BLD AUTO: 29.6 % (ref 40.5–52.5)
HGB BLD-MCNC: 10.2 G/DL (ref 13.5–17.5)
LYMPHOCYTES # BLD: 1.6 K/UL (ref 1–5.1)
LYMPHOCYTES NFR BLD: 30.4 %
MCH RBC QN AUTO: 33.3 PG (ref 26–34)
MCHC RBC AUTO-ENTMCNC: 34.3 G/DL (ref 31–36)
MCV RBC AUTO: 97.1 FL (ref 80–100)
MONOCYTES # BLD: 0.8 K/UL (ref 0–1.3)
MONOCYTES NFR BLD: 14.7 %
NEUTROPHILS # BLD: 2.7 K/UL (ref 1.7–7.7)
NEUTROPHILS NFR BLD: 53.1 %
PLATELET # BLD AUTO: 137 K/UL (ref 135–450)
PMV BLD AUTO: 8.5 FL (ref 5–10.5)
POTASSIUM SERPL-SCNC: 4.3 MMOL/L (ref 3.5–5.1)
PROCALCITONIN SERPL IA-MCNC: 0.14 NG/ML (ref 0–0.15)
RBC # BLD AUTO: 3.05 M/UL (ref 4.2–5.9)
SODIUM SERPL-SCNC: 138 MMOL/L (ref 136–145)
VANCOMYCIN SERPL-MCNC: 8.9 UG/ML
WBC # BLD AUTO: 5.1 K/UL (ref 4–11)

## 2025-02-23 PROCEDURE — 84145 PROCALCITONIN (PCT): CPT

## 2025-02-23 PROCEDURE — 85025 COMPLETE CBC W/AUTO DIFF WBC: CPT

## 2025-02-23 PROCEDURE — 6360000002 HC RX W HCPCS: Performed by: INTERNAL MEDICINE

## 2025-02-23 PROCEDURE — 97110 THERAPEUTIC EXERCISES: CPT

## 2025-02-23 PROCEDURE — 6370000000 HC RX 637 (ALT 250 FOR IP): Performed by: NURSE PRACTITIONER

## 2025-02-23 PROCEDURE — 6360000002 HC RX W HCPCS: Performed by: NURSE PRACTITIONER

## 2025-02-23 PROCEDURE — 36415 COLL VENOUS BLD VENIPUNCTURE: CPT

## 2025-02-23 PROCEDURE — 2500000003 HC RX 250 WO HCPCS: Performed by: NURSE PRACTITIONER

## 2025-02-23 PROCEDURE — 97535 SELF CARE MNGMENT TRAINING: CPT

## 2025-02-23 PROCEDURE — 80048 BASIC METABOLIC PNL TOTAL CA: CPT

## 2025-02-23 PROCEDURE — 97167 OT EVAL HIGH COMPLEX 60 MIN: CPT

## 2025-02-23 PROCEDURE — 80202 ASSAY OF VANCOMYCIN: CPT

## 2025-02-23 PROCEDURE — 1200000000 HC SEMI PRIVATE

## 2025-02-23 PROCEDURE — 97530 THERAPEUTIC ACTIVITIES: CPT

## 2025-02-23 RX ORDER — VANCOMYCIN 1.75 G/350ML
1250 INJECTION, SOLUTION INTRAVENOUS ONCE
Status: COMPLETED | OUTPATIENT
Start: 2025-02-23 | End: 2025-02-23

## 2025-02-23 RX ORDER — ACETAMINOPHEN 500 MG
500 TABLET ORAL 3 TIMES DAILY
Status: DISPENSED | OUTPATIENT
Start: 2025-02-23 | End: 2025-02-24

## 2025-02-23 RX ADMIN — ATORVASTATIN CALCIUM 40 MG: 40 TABLET, FILM COATED ORAL at 21:38

## 2025-02-23 RX ADMIN — HYDRALAZINE HYDROCHLORIDE 50 MG: 25 TABLET ORAL at 21:38

## 2025-02-23 RX ADMIN — HYDRALAZINE HYDROCHLORIDE 50 MG: 25 TABLET ORAL at 15:26

## 2025-02-23 RX ADMIN — TAMSULOSIN HYDROCHLORIDE 0.4 MG: 0.4 CAPSULE ORAL at 21:38

## 2025-02-23 RX ADMIN — ACETAMINOPHEN 500 MG: 500 TABLET ORAL at 15:27

## 2025-02-23 RX ADMIN — VANCOMYCIN 1250 MG: 1.75 INJECTION, SOLUTION INTRAVENOUS at 08:10

## 2025-02-23 RX ADMIN — ONDANSETRON 4 MG: 2 INJECTION, SOLUTION INTRAMUSCULAR; INTRAVENOUS at 11:07

## 2025-02-23 RX ADMIN — Medication 10 ML: at 08:09

## 2025-02-23 RX ADMIN — HYDRALAZINE HYDROCHLORIDE 50 MG: 25 TABLET ORAL at 08:09

## 2025-02-23 RX ADMIN — WATER 1000 MG: 1 INJECTION INTRAMUSCULAR; INTRAVENOUS; SUBCUTANEOUS at 22:51

## 2025-02-23 RX ADMIN — WATER 1000 MG: 1 INJECTION INTRAMUSCULAR; INTRAVENOUS; SUBCUTANEOUS at 15:27

## 2025-02-23 RX ADMIN — Medication 10 ML: at 21:47

## 2025-02-23 RX ADMIN — ENOXAPARIN SODIUM 30 MG: 100 INJECTION SUBCUTANEOUS at 08:09

## 2025-02-23 RX ADMIN — ASPIRIN 81 MG: 81 TABLET, COATED ORAL at 08:09

## 2025-02-23 ASSESSMENT — PAIN DESCRIPTION - DESCRIPTORS: DESCRIPTORS: ACHING

## 2025-02-23 ASSESSMENT — PAIN SCALES - GENERAL
PAINLEVEL_OUTOF10: 0
PAINLEVEL_OUTOF10: 2
PAINLEVEL_OUTOF10: 2

## 2025-02-23 ASSESSMENT — PAIN DESCRIPTION - LOCATION
LOCATION: HAND
LOCATION: HAND

## 2025-02-23 ASSESSMENT — PAIN DESCRIPTION - ORIENTATION: ORIENTATION: LEFT

## 2025-02-23 NOTE — ACP (ADVANCE CARE PLANNING)
Advance Care Planning     Advance Care Planning Inpatient Note  [facility] Care Department    Today's Date: 2/23/2025  Unit: [place] C3 TELE/MED SURG/ONC    Received request from admission screening.  Upon review of chart and communication with care team, request Health Care Provider's clarification of patient's decision making capacity.. Patient was/were present in the room during visit.    Goals of ACP Conversation:  Discuss advance care planning documents    Health Care Decision Makers:       Primary Decision Maker: Haley Lopez - Child - [phone]    Secondary Decision Maker: Mariel Juan - Child - [phone]  Summary:  No Decision Maker named by patient at this time    Advance Care Planning Documents (Patient Wishes):  None     Assessment:  Per RN, Pt has not been fully alert and oriented. When  checked in on Pt, he was focused on feeling nauseous.  informed RN of Pt's request for nausea medicine.     Interventions:  Unable to go over ADs at this time    Care Preferences Communicated:   No    Outcomes/Plan:  Should Pt be alert and oriented enough to go over Advance Directive forms, please re-consult [facility].    Electronically signed by Chaplain Angela on 2/23/2025 at 11:03 AM

## 2025-02-24 ENCOUNTER — APPOINTMENT (OUTPATIENT)
Dept: MRI IMAGING | Age: 89
DRG: 603 | End: 2025-02-24
Payer: OTHER GOVERNMENT

## 2025-02-24 LAB
ANION GAP SERPL CALCULATED.3IONS-SCNC: 11 MMOL/L (ref 3–16)
BASOPHILS # BLD: 0 K/UL (ref 0–0.2)
BASOPHILS NFR BLD: 0.3 %
BUN SERPL-MCNC: 36 MG/DL (ref 7–20)
CALCIUM SERPL-MCNC: 8.3 MG/DL (ref 8.3–10.6)
CHLORIDE SERPL-SCNC: 106 MMOL/L (ref 99–110)
CO2 SERPL-SCNC: 22 MMOL/L (ref 21–32)
CREAT SERPL-MCNC: 1.9 MG/DL (ref 0.8–1.3)
DEPRECATED RDW RBC AUTO: 13.1 % (ref 12.4–15.4)
EOSINOPHIL # BLD: 0.1 K/UL (ref 0–0.6)
EOSINOPHIL NFR BLD: 1.3 %
GFR SERPLBLD CREATININE-BSD FMLA CKD-EPI: 32 ML/MIN/{1.73_M2}
GLUCOSE SERPL-MCNC: 100 MG/DL (ref 70–99)
HCT VFR BLD AUTO: 28.9 % (ref 40.5–52.5)
HGB BLD-MCNC: 10 G/DL (ref 13.5–17.5)
LYMPHOCYTES # BLD: 1.7 K/UL (ref 1–5.1)
LYMPHOCYTES NFR BLD: 27.4 %
MCH RBC QN AUTO: 33.1 PG (ref 26–34)
MCHC RBC AUTO-ENTMCNC: 34.4 G/DL (ref 31–36)
MCV RBC AUTO: 96.1 FL (ref 80–100)
MONOCYTES # BLD: 0.6 K/UL (ref 0–1.3)
MONOCYTES NFR BLD: 10 %
NEUTROPHILS # BLD: 3.8 K/UL (ref 1.7–7.7)
NEUTROPHILS NFR BLD: 61 %
PLATELET # BLD AUTO: 146 K/UL (ref 135–450)
PMV BLD AUTO: 8.6 FL (ref 5–10.5)
POTASSIUM SERPL-SCNC: 3.9 MMOL/L (ref 3.5–5.1)
RBC # BLD AUTO: 3.01 M/UL (ref 4.2–5.9)
SODIUM SERPL-SCNC: 139 MMOL/L (ref 136–145)
WBC # BLD AUTO: 6.2 K/UL (ref 4–11)

## 2025-02-24 PROCEDURE — 2500000003 HC RX 250 WO HCPCS: Performed by: NURSE PRACTITIONER

## 2025-02-24 PROCEDURE — 6360000002 HC RX W HCPCS: Performed by: NURSE PRACTITIONER

## 2025-02-24 PROCEDURE — 85025 COMPLETE CBC W/AUTO DIFF WBC: CPT

## 2025-02-24 PROCEDURE — 80048 BASIC METABOLIC PNL TOTAL CA: CPT

## 2025-02-24 PROCEDURE — 97116 GAIT TRAINING THERAPY: CPT

## 2025-02-24 PROCEDURE — 51702 INSERT TEMP BLADDER CATH: CPT

## 2025-02-24 PROCEDURE — 36415 COLL VENOUS BLD VENIPUNCTURE: CPT

## 2025-02-24 PROCEDURE — 6370000000 HC RX 637 (ALT 250 FOR IP): Performed by: NURSE PRACTITIONER

## 2025-02-24 PROCEDURE — 73218 MRI UPPER EXTREMITY W/O DYE: CPT

## 2025-02-24 PROCEDURE — 97530 THERAPEUTIC ACTIVITIES: CPT

## 2025-02-24 PROCEDURE — 97162 PT EVAL MOD COMPLEX 30 MIN: CPT

## 2025-02-24 PROCEDURE — 1200000000 HC SEMI PRIVATE

## 2025-02-24 RX ORDER — ACETAMINOPHEN 325 MG/1
650 TABLET ORAL EVERY 6 HOURS PRN
Status: DISCONTINUED | OUTPATIENT
Start: 2025-02-24 | End: 2025-02-26 | Stop reason: HOSPADM

## 2025-02-24 RX ADMIN — Medication 10 ML: at 09:31

## 2025-02-24 RX ADMIN — HYDRALAZINE HYDROCHLORIDE 50 MG: 25 TABLET ORAL at 20:37

## 2025-02-24 RX ADMIN — HYDRALAZINE HYDROCHLORIDE 50 MG: 25 TABLET ORAL at 09:26

## 2025-02-24 RX ADMIN — TAMSULOSIN HYDROCHLORIDE 0.4 MG: 0.4 CAPSULE ORAL at 20:37

## 2025-02-24 RX ADMIN — WATER 1000 MG: 1 INJECTION INTRAMUSCULAR; INTRAVENOUS; SUBCUTANEOUS at 14:43

## 2025-02-24 RX ADMIN — WATER 1000 MG: 1 INJECTION INTRAMUSCULAR; INTRAVENOUS; SUBCUTANEOUS at 21:58

## 2025-02-24 RX ADMIN — WATER 1000 MG: 1 INJECTION INTRAMUSCULAR; INTRAVENOUS; SUBCUTANEOUS at 06:39

## 2025-02-24 RX ADMIN — ENOXAPARIN SODIUM 30 MG: 100 INJECTION SUBCUTANEOUS at 09:26

## 2025-02-24 RX ADMIN — ATORVASTATIN CALCIUM 40 MG: 40 TABLET, FILM COATED ORAL at 20:37

## 2025-02-24 RX ADMIN — MAGNESIUM HYDROXIDE 30 ML: 400 SUSPENSION ORAL at 09:32

## 2025-02-24 RX ADMIN — HYDRALAZINE HYDROCHLORIDE 50 MG: 25 TABLET ORAL at 14:43

## 2025-02-24 RX ADMIN — ASPIRIN 81 MG: 81 TABLET, COATED ORAL at 09:26

## 2025-02-24 RX ADMIN — ACETAMINOPHEN 500 MG: 500 TABLET ORAL at 09:26

## 2025-02-24 RX ADMIN — Medication 10 ML: at 21:58

## 2025-02-24 ASSESSMENT — PAIN SCALES - GENERAL
PAINLEVEL_OUTOF10: 0
PAINLEVEL_OUTOF10: 0

## 2025-02-24 NOTE — CONSULTS
Pharmacy Note  Vancomycin Consult    Javier De La Torre is a 89 y.o. male started on Vancomycin for SSTI; consult received from Jeanne RachealGEENA Yuen   to manage therapy. Also receiving the following antibiotics: N/A.    Allergies:  Amlodipine, Hydrochlorothiazide, Terazosin hcl, and Zetia [ezetimibe]     Tmax: 99.3 F    Micro:     Date Culture Results   2/21 Wound; hand Ordered; not collected yet   2/21 MRSA; nares Ordered; not collected yet       Recent Labs     02/21/25  1129   CREATININE 2.0*       Recent Labs     02/21/25  1129   WBC 8.2       Estimated Creatinine Clearance: 20 mL/min (A) (based on SCr of 2 mg/dL (H)).      Intake/Output Summary (Last 24 hours) at 2/21/2025 1538  Last data filed at 2/21/2025 1321  Gross per 24 hour   Intake 100 ml   Output --   Net 100 ml       Wt Readings from Last 1 Encounters:   02/21/25 64.9 kg (143 lb)         Body mass index is 19.94 kg/m².    Loading dose (critically ill or in ICU, require dialysis or renal replacement therapy): Vancomycin 25 mg/kg IVPB x 1 (maximum 2500 mg).  Maintenance dose: 10-20 mg/kg (maximum: 2000 mg/dose and 4500 mg/day) starting at the next dosing interval determined by renal function  Pulse dose: fluctuating renal function, AURELIO, ESRD  CRRT: 7.5-10 mg/kg q12h   Goal Vancomycin trough: 15-20 mcg/mL   Goal Vancomycin AUC: 400-600     Assessment/Plan:  Will initiate Vancomycin with a one time loading dose of 2000 mg x1. Will pulse dose patient given renal insufficiency. Vancomycin level ordered for tomorrow (2/22) at 0600 to assess drug clearance. Timing of trough level will be determined based on culture results, renal function, and clinical response.     Thank you for the consult.    Polly Sotelo, PharmD 2/21/2025 3:38 PM      Vancomycin Day 2  Recent Labs     02/22/25  0524   VANCORANDOM 9.7     Recent Labs     02/21/25  1129 02/22/25  0524   CREATININE 2.0* 1.9*     Estimated Creatinine Clearance: 21 mL/min (A) (based on SCr of 1.9 mg/dL 
  Pharmacy Note  Vancomycin Consult    Javier De La Torre is a 89 y.o. male started on Vancomycin to treat SSTI; consult received from JANIA Ríos to manage therapy.    Allergies:  Amlodipine, Hydrochlorothiazide, Terazosin hcl, and Zetia [ezetimibe]     Recent Labs     02/21/25  1129   WBC 8.2   CREATININE 2.0*     Estimated Creatinine Clearance: 20 mL/min (A) (based on SCr of 2 mg/dL (H)).  No intake or output data in the 24 hours ending 02/21/25 1305  Wt Readings from Last 1 Encounters:   02/21/25 64.9 kg (143 lb)       Body mass index is 19.94 kg/m².    Assessment/Plan:  Will initiate Vancomycin with a one time loading dose of 1250 mg x1. Please re-consult pharmacy if vancomycin will be continued inpatient.     Thank you for the consult.  Blair Hernández, PharmD    2/21/2025 1:05 PM  
Consult Call Back    Who: April Wolf MD    Date:2/24/2025,  Time:11:46 AM    Electronically signed by Meredith Solis on 2/24/25 at 11:46 AM EST    
Consult Placed     Who: arebi  Date:2/21/25  Time:1715   Perfect served  Electronically signed by Bianca Torres on 2/21/2025 at 5:14 PM    
bilateral  Lymphatics; No groin or axillary enlarged lymph nodes.  Neck; No swelling  Abdomen; soft, non distended.     MUSCULOSKELETAL: Examination of both Upper extremities showing a decrease range of motion of the left hand compare to the other side. There is moderate swelling that can be seen.    He has intact sensation and good radial pulses, tenderness on deep palpation over the dorsum of the left wrist, and has a decrease hand  strength.    NEUROLOGIC:   Sensory:    Touch:                     Right Upper Extremity:  normal                   Left Upper Extremity:  normal                  Right Lower Extremity:  normal                  Left Lower Extremity:  normal              DATA:    CBC:   Lab Results   Component Value Date/Time    WBC 8.2 02/21/2025 11:29 AM    RBC 3.36 02/21/2025 11:29 AM    HGB 11.0 02/21/2025 11:29 AM    HCT 33.5 02/21/2025 11:29 AM    MCV 99.6 02/21/2025 11:29 AM    MCH 32.8 02/21/2025 11:29 AM    MCHC 32.9 02/21/2025 11:29 AM    RDW 14.2 02/21/2025 11:29 AM     02/21/2025 11:29 AM    MPV 8.5 02/21/2025 11:29 AM     WBC:    Lab Results   Component Value Date/Time    WBC 8.2 02/21/2025 11:29 AM     PT/INR:  No results found for: \"PROTIME\", \"INR\"  PTT:  No results found for: \"APTT\"[APTT    IMAGING: Xray's were reviewed, taken 2/21/2025 in the ED, 3 views of the left  wrist, and showed no fracture, soft tissue swelling.       IMPRESSION: Left hand  pain and swelling/ cellulitis.      PLAN: The xray findings were reviewed with the patient, and that the pain is likely 2ry to hand cellulitis, and should improve with IV ABX. We recommend elevation and avoid heavy impact activities. We will order CT scan left hand and we will follow.    Thank you very much for the kind consultation and allowing me to participate in this patient's care.  I will continue to keep you apprised of his progress.        Tabitha Romero MD   2/21/2025  7:56 PM           
        Medical Decision Making:  The following items were considered in medical decision making:  Discussion of patient care with other providers  Reviewed clinical lab tests  Reviewed radiology tests  Reviewed other diagnostic tests/interventions  Independent review of radiologic images  Microbiology cultures and other micro tests reviewed       Risk of Complications/Morbidity: moderate   Illness(es)/ Infection present that pose threat to bodily function.   There is potential for severe exacerbation of infection/side effects of treatment.  Therapy requires intensive monitoring for antimicrobial agent toxicity       April Wolf M.D.    Thank you for the opportunity to participate in the care of your patient.    Please do not hesitate to contact me:   [phone] office

## 2025-02-25 LAB
ANION GAP SERPL CALCULATED.3IONS-SCNC: 10 MMOL/L (ref 3–16)
BACTERIA BLD CULT ORG #2: NORMAL
BACTERIA BLD CULT: NORMAL
BASOPHILS # BLD: 0 K/UL (ref 0–0.2)
BASOPHILS NFR BLD: 0.5 %
BUN SERPL-MCNC: 32 MG/DL (ref 7–20)
CALCIUM SERPL-MCNC: 8.5 MG/DL (ref 8.3–10.6)
CHLORIDE SERPL-SCNC: 107 MMOL/L (ref 99–110)
CO2 SERPL-SCNC: 23 MMOL/L (ref 21–32)
CREAT SERPL-MCNC: 1.7 MG/DL (ref 0.8–1.3)
DEPRECATED RDW RBC AUTO: 13.4 % (ref 12.4–15.4)
EOSINOPHIL # BLD: 0.1 K/UL (ref 0–0.6)
EOSINOPHIL NFR BLD: 1.5 %
GFR SERPLBLD CREATININE-BSD FMLA CKD-EPI: 37 ML/MIN/{1.73_M2}
GLUCOSE SERPL-MCNC: 92 MG/DL (ref 70–99)
HCT VFR BLD AUTO: 29.8 % (ref 40.5–52.5)
HGB BLD-MCNC: 10.2 G/DL (ref 13.5–17.5)
LYMPHOCYTES # BLD: 2.1 K/UL (ref 1–5.1)
LYMPHOCYTES NFR BLD: 32.3 %
MCH RBC QN AUTO: 33.1 PG (ref 26–34)
MCHC RBC AUTO-ENTMCNC: 34.2 G/DL (ref 31–36)
MCV RBC AUTO: 96.9 FL (ref 80–100)
MONOCYTES # BLD: 0.6 K/UL (ref 0–1.3)
MONOCYTES NFR BLD: 9.4 %
NEUTROPHILS # BLD: 3.6 K/UL (ref 1.7–7.7)
NEUTROPHILS NFR BLD: 56.3 %
PLATELET # BLD AUTO: 156 K/UL (ref 135–450)
PMV BLD AUTO: 8.3 FL (ref 5–10.5)
POTASSIUM SERPL-SCNC: 3.9 MMOL/L (ref 3.5–5.1)
RBC # BLD AUTO: 3.08 M/UL (ref 4.2–5.9)
SODIUM SERPL-SCNC: 140 MMOL/L (ref 136–145)
WBC # BLD AUTO: 6.4 K/UL (ref 4–11)

## 2025-02-25 PROCEDURE — 36415 COLL VENOUS BLD VENIPUNCTURE: CPT

## 2025-02-25 PROCEDURE — 97530 THERAPEUTIC ACTIVITIES: CPT

## 2025-02-25 PROCEDURE — 2500000003 HC RX 250 WO HCPCS: Performed by: NURSE PRACTITIONER

## 2025-02-25 PROCEDURE — 1200000000 HC SEMI PRIVATE

## 2025-02-25 PROCEDURE — 80048 BASIC METABOLIC PNL TOTAL CA: CPT

## 2025-02-25 PROCEDURE — 51702 INSERT TEMP BLADDER CATH: CPT

## 2025-02-25 PROCEDURE — 6370000000 HC RX 637 (ALT 250 FOR IP): Performed by: NURSE PRACTITIONER

## 2025-02-25 PROCEDURE — 6360000002 HC RX W HCPCS: Performed by: NURSE PRACTITIONER

## 2025-02-25 PROCEDURE — 97116 GAIT TRAINING THERAPY: CPT

## 2025-02-25 PROCEDURE — 85025 COMPLETE CBC W/AUTO DIFF WBC: CPT

## 2025-02-25 PROCEDURE — 97110 THERAPEUTIC EXERCISES: CPT

## 2025-02-25 RX ADMIN — WATER 1000 MG: 1 INJECTION INTRAMUSCULAR; INTRAVENOUS; SUBCUTANEOUS at 06:59

## 2025-02-25 RX ADMIN — Medication 10 ML: at 09:11

## 2025-02-25 RX ADMIN — ATORVASTATIN CALCIUM 40 MG: 40 TABLET, FILM COATED ORAL at 19:39

## 2025-02-25 RX ADMIN — HYDRALAZINE HYDROCHLORIDE 50 MG: 25 TABLET ORAL at 15:46

## 2025-02-25 RX ADMIN — TAMSULOSIN HYDROCHLORIDE 0.4 MG: 0.4 CAPSULE ORAL at 19:39

## 2025-02-25 RX ADMIN — ENOXAPARIN SODIUM 30 MG: 100 INJECTION SUBCUTANEOUS at 09:11

## 2025-02-25 RX ADMIN — HYDRALAZINE HYDROCHLORIDE 50 MG: 25 TABLET ORAL at 09:10

## 2025-02-25 RX ADMIN — WATER 1000 MG: 1 INJECTION INTRAMUSCULAR; INTRAVENOUS; SUBCUTANEOUS at 22:16

## 2025-02-25 RX ADMIN — HYDRALAZINE HYDROCHLORIDE 50 MG: 25 TABLET ORAL at 19:39

## 2025-02-25 RX ADMIN — Medication 10 ML: at 19:42

## 2025-02-25 RX ADMIN — WATER 1000 MG: 1 INJECTION INTRAMUSCULAR; INTRAVENOUS; SUBCUTANEOUS at 15:46

## 2025-02-25 RX ADMIN — ASPIRIN 81 MG: 81 TABLET, COATED ORAL at 09:10

## 2025-02-25 ASSESSMENT — PAIN SCALES - GENERAL: PAINLEVEL_OUTOF10: 0

## 2025-02-25 NOTE — DISCHARGE INSTR - COC
Assisted  Toileting  Assisted  Feeding  Assisted  Med Admin  Assisted  Med Delivery   whole    Wound Care Documentation and Therapy:        Elimination:  Continence:   Bowel: Yes  Bladder: Yes and [place]  Urinary Catheter: Indication for Use of Catheter: Acute urinary retention/obstruction   Colostomy/Ileostomy/Ileal Conduit: No       Date of Last BM: 2/21    Intake/Output Summary (Last 24 hours) at 2/25/2025 0921  Last data filed at 2/24/2025 2148  Gross per 24 hour   Intake 480 ml   Output 1100 ml   Net -620 ml     I/O last 3 completed shifts:  In: 700 [P.O.:700]  Out: 1400 [Urine:1400]    Safety Concerns:     At Risk for Falls    Impairments/Disabilities:      Hearing    Nutrition Therapy:  Current Nutrition Therapy:   - Oral Diet:  General    Routes of Feeding: Oral  Liquids: Thin Liquids  Daily Fluid Restriction: no  Last Modified Barium Swallow with Video (Video Swallowing Test): not done    Treatments at the Time of Hospital Discharge:   Respiratory Treatments:   Oxygen Therapy:  is not on home oxygen therapy.  Ventilator:    - No ventilator support    Rehab Therapies: Physical Therapy and Occupational Therapy  Weight Bearing Status/Restrictions: No weight bearing restrictions  Other Medical Equipment (for information only, NOT a DME order):  walker  Other Treatments:     Patient's personal belongings (please select all that are sent with patient):  None    RN SIGNATURE:  Electronically signed by Jennifer Ghosh RN on 2/26/25 at 11:17 AM EST    CASE MANAGEMENT/SOCIAL WORK SECTION    Inpatient Status Date: 2/21/25    Readmission Risk Assessment Score:  [facility] RISK OF UNPLANNED READMISSION 2.0             16.8 Total Score        Discharging to Facility/ Agency   :[facility]  Services: Skilled Nursing  [address] [place], [place] [id number] [phone]        / signature: {Esignature:[id number]}    PHYSICIAN SECTION    Prognosis: Fair    Condition at Discharge: Stable    Rehab

## 2025-02-25 NOTE — DISCHARGE INSTRUCTIONS
Elevation to the left hand and wrist to help alleviate swelling  Take all antibiotics as prescribed, even if you are feeling better  Follow up with your primary care provider regarding left hand cellulitis.

## 2025-02-26 VITALS
SYSTOLIC BLOOD PRESSURE: 152 MMHG | RESPIRATION RATE: 16 BRPM | DIASTOLIC BLOOD PRESSURE: 65 MMHG | HEART RATE: 71 BPM | OXYGEN SATURATION: 96 % | TEMPERATURE: 98.4 F | WEIGHT: 142.5 LBS | BODY MASS INDEX: 19.3 KG/M2 | HEIGHT: 72 IN

## 2025-02-26 LAB
ANION GAP SERPL CALCULATED.3IONS-SCNC: 10 MMOL/L (ref 3–16)
BASOPHILS # BLD: 0 K/UL (ref 0–0.2)
BASOPHILS NFR BLD: 0.4 %
BUN SERPL-MCNC: 30 MG/DL (ref 7–20)
CALCIUM SERPL-MCNC: 8.6 MG/DL (ref 8.3–10.6)
CHLORIDE SERPL-SCNC: 106 MMOL/L (ref 99–110)
CO2 SERPL-SCNC: 23 MMOL/L (ref 21–32)
CREAT SERPL-MCNC: 1.7 MG/DL (ref 0.8–1.3)
DEPRECATED RDW RBC AUTO: 13.2 % (ref 12.4–15.4)
EOSINOPHIL # BLD: 0.1 K/UL (ref 0–0.6)
EOSINOPHIL NFR BLD: 1.4 %
GFR SERPLBLD CREATININE-BSD FMLA CKD-EPI: 37 ML/MIN/{1.73_M2}
GLUCOSE SERPL-MCNC: 97 MG/DL (ref 70–99)
HCT VFR BLD AUTO: 29.2 % (ref 40.5–52.5)
HGB BLD-MCNC: 10.1 G/DL (ref 13.5–17.5)
LYMPHOCYTES # BLD: 2 K/UL (ref 1–5.1)
LYMPHOCYTES NFR BLD: 30.6 %
MCH RBC QN AUTO: 33.1 PG (ref 26–34)
MCHC RBC AUTO-ENTMCNC: 34.4 G/DL (ref 31–36)
MCV RBC AUTO: 96.1 FL (ref 80–100)
MONOCYTES # BLD: 0.5 K/UL (ref 0–1.3)
MONOCYTES NFR BLD: 7.6 %
NEUTROPHILS # BLD: 4 K/UL (ref 1.7–7.7)
NEUTROPHILS NFR BLD: 60 %
PLATELET # BLD AUTO: 156 K/UL (ref 135–450)
PMV BLD AUTO: 8.4 FL (ref 5–10.5)
POTASSIUM SERPL-SCNC: 4 MMOL/L (ref 3.5–5.1)
RBC # BLD AUTO: 3.04 M/UL (ref 4.2–5.9)
SODIUM SERPL-SCNC: 139 MMOL/L (ref 136–145)
WBC # BLD AUTO: 6.7 K/UL (ref 4–11)

## 2025-02-26 PROCEDURE — 6360000002 HC RX W HCPCS: Performed by: NURSE PRACTITIONER

## 2025-02-26 PROCEDURE — 2500000003 HC RX 250 WO HCPCS: Performed by: NURSE PRACTITIONER

## 2025-02-26 PROCEDURE — 85025 COMPLETE CBC W/AUTO DIFF WBC: CPT

## 2025-02-26 PROCEDURE — 36415 COLL VENOUS BLD VENIPUNCTURE: CPT

## 2025-02-26 PROCEDURE — 6370000000 HC RX 637 (ALT 250 FOR IP): Performed by: NURSE PRACTITIONER

## 2025-02-26 PROCEDURE — 80048 BASIC METABOLIC PNL TOTAL CA: CPT

## 2025-02-26 RX ORDER — CEFADROXIL 1000 MG/1
1 TABLET ORAL 2 TIMES DAILY
DISCHARGE
Start: 2025-02-26 | End: 2025-03-03

## 2025-02-26 RX ADMIN — WATER 1000 MG: 1 INJECTION INTRAMUSCULAR; INTRAVENOUS; SUBCUTANEOUS at 06:04

## 2025-02-26 RX ADMIN — Medication 10 ML: at 08:20

## 2025-02-26 RX ADMIN — HYDRALAZINE HYDROCHLORIDE 50 MG: 25 TABLET ORAL at 08:20

## 2025-02-26 RX ADMIN — ASPIRIN 81 MG: 81 TABLET, COATED ORAL at 08:20

## 2025-02-26 RX ADMIN — MAGNESIUM HYDROXIDE 30 ML: 400 SUSPENSION ORAL at 08:24

## 2025-02-26 RX ADMIN — ENOXAPARIN SODIUM 30 MG: 100 INJECTION SUBCUTANEOUS at 08:20

## 2025-02-26 RX ADMIN — ACETAMINOPHEN 650 MG: 325 TABLET ORAL at 06:03

## 2025-02-26 ASSESSMENT — PAIN DESCRIPTION - DESCRIPTORS: DESCRIPTORS: ACHING

## 2025-02-26 ASSESSMENT — PAIN - FUNCTIONAL ASSESSMENT: PAIN_FUNCTIONAL_ASSESSMENT: ACTIVITIES ARE NOT PREVENTED

## 2025-02-26 ASSESSMENT — PAIN DESCRIPTION - ONSET: ONSET: ON-GOING

## 2025-02-26 ASSESSMENT — PAIN DESCRIPTION - FREQUENCY: FREQUENCY: CONTINUOUS

## 2025-02-26 ASSESSMENT — PAIN DESCRIPTION - PAIN TYPE: TYPE: ACUTE PAIN

## 2025-02-26 ASSESSMENT — PAIN DESCRIPTION - LOCATION: LOCATION: ARM

## 2025-02-26 ASSESSMENT — PAIN SCALES - GENERAL: PAINLEVEL_OUTOF10: 4

## 2025-02-26 ASSESSMENT — PAIN DESCRIPTION - ORIENTATION: ORIENTATION: RIGHT

## 2025-02-26 NOTE — DISCHARGE SUMMARY
Hospital Medicine Discharge Summary    Patient: Javier De La Torre   : 1929     Hospital:  [facility]  Admit Date: 2025   Discharge Date:   ***  Disposition:  []Home   []HHC  []SNF  []Acute Rehab  []LTAC  []Hospice  Code status:  []Full  []DNR/CCA  []Limited (DNR/CCA with Do Not Intubate)  []DNRCC  Condition at Discharge: Stable  Primary Care Provider: Nicholas Urrutia MD    Admitting Provider: Earl Lazaro MD  Discharge Provider: Lavell Villa APRN - CNP     Discharge Diagnoses:      Active Hospital Problems    Diagnosis     Cellulitis of left upper extremity [L03.114]        Presenting Admission History:      ***     Assessment/Plan:      ***    Physical Exam Performed:      BP (!) 152/65   Pulse 71   Temp 98.4 °F (36.9 °C)   Resp 16   Ht 1.829 m (6')   Wt 64.6 kg (142 lb 8 oz)   SpO2 96%   BMI 19.33 kg/m²       General appearance:  No apparent distress, appears stated age and cooperative.  Respiratory:  Normal respiratory effort.   Cardiovascular:  Regular rate and rhythm.  Abdomen:  Soft, non-tender, non-distended.  Musculoskeletal:  No edema  Neurologic:  Non-focal  Psychiatric:  Alert and oriented    Patient Discharge Instructions:      Follow up:    1.  Primary Care Provider Nicholas Urrutia MD in the next 1-2 weeks.      The patient was seen and examined on day of discharge and this discharge summary is in conjunction with any daily progress note from day of discharge. Time spent on discharge: 3*** minutes in the examination, evaluation, counseling and review of medications and discharge plan.    ------------------------------------------------------------------------------------------------------------------------------------------------------    Discharge Medications:   Current Discharge Medication List        START taking these medications    Details   cefadroxil (DURICEF) 1 g tablet Take 1 tablet by mouth 2 times daily for 5 days           Current Discharge Medication List

## 2025-02-26 NOTE — PLAN OF CARE
Problem: Pain  Goal: Verbalizes/displays adequate comfort level or baseline comfort level  Outcome: Progressing     Problem: Safety - Adult  Goal: Free from fall injury  Outcome: Progressing     Problem: Skin/Tissue Integrity  Goal: Skin integrity remains intact  Description: 1.  Monitor for areas of redness and/or skin breakdown  2.  Assess vascular access sites hourly  3.  Every 4-6 hours minimum:  Change oxygen saturation probe site  4.  Every 4-6 hours:  If on nasal continuous positive airway pressure, respiratory therapy assess nares and determine need for appliance change or resting period  Outcome: Progressing     Problem: Skin/Tissue Integrity - Adult  Goal: Skin integrity remains intact  Description: 1.  Monitor for areas of redness and/or skin breakdown  2.  Assess vascular access sites hourly  3.  Every 4-6 hours minimum:  Change oxygen saturation probe site  4.  Every 4-6 hours:  If on nasal continuous positive airway pressure, respiratory therapy assess nares and determine need for appliance change or resting period  Outcome: Progressing     
  Problem: Safety - Adult  Goal: Free from fall injury  2/22/2025 0444 by Chen Ring RN  Outcome: Progressing     Problem: Pain  Goal: Verbalizes/displays adequate comfort level or baseline comfort level  2/22/2025 0444 by Chen Ring RN  Outcome: Progressing     Problem: Skin/Tissue Integrity  Goal: Skin integrity remains intact  Description: 1.  Monitor for areas of redness and/or skin breakdown  2.  Assess vascular access sites hourly  3.  Every 4-6 hours minimum:  Change oxygen saturation probe site  4.  Every 4-6 hours:  If on nasal continuous positive airway pressure, respiratory therapy assess nares and determine need for appliance change or resting period  2/22/2025 0444 by Chen Ring RN  Outcome: Progressing     
  Problem: Safety - Adult  Goal: Free from fall injury  2/22/2025 1350 by Kasia Haynes RN  Outcome: Progressing  Flowsheets (Taken 2/22/2025 1350)  Free From Fall Injury: Instruct family/caregiver on patient safety     Problem: Pain  Goal: Verbalizes/displays adequate comfort level or baseline comfort level  2/22/2025 1350 by Kasia Haynes RN  Outcome: Progressing  Flowsheets (Taken 2/22/2025 1350)  Verbalizes/displays adequate comfort level or baseline comfort level:   Encourage patient to monitor pain and request assistance   Administer analgesics based on type and severity of pain and evaluate response   Consider cultural and social influences on pain and pain management     Problem: ABCDS Injury Assessment  Goal: Absence of physical injury  Outcome: Progressing  Flowsheets (Taken 2/22/2025 1350)  Absence of Physical Injury: Implement safety measures based on patient assessment     
  Problem: Safety - Adult  Goal: Free from fall injury  2/23/2025 0100 by Chen Ring, RN  Outcome: Progressing     Problem: Pain  Goal: Verbalizes/displays adequate comfort level or baseline comfort level  2/23/2025 0100 by Chen Ring, RON  Outcome: Progressing     Problem: Skin/Tissue Integrity  Goal: Skin integrity remains intact  Description: 1.  Monitor for areas of redness and/or skin breakdown  2.  Assess vascular access sites hourly  3.  Every 4-6 hours minimum:  Change oxygen saturation probe site  4.  Every 4-6 hours:  If on nasal continuous positive airway pressure, respiratory therapy assess nares and determine need for appliance change or resting period  Outcome: Progressing          
  Problem: Safety - Adult  Goal: Free from fall injury  2/23/2025 1042 by Kasia Haynes RN  Outcome: Progressing  Flowsheets (Taken 2/23/2025 1042)  Free From Fall Injury: Instruct family/caregiver on patient safety     Problem: Pain  Goal: Verbalizes/displays adequate comfort level or baseline comfort level  2/23/2025 1042 by Kasia Haynes RN  Outcome: Progressing  Flowsheets (Taken 2/23/2025 1042)  Verbalizes/displays adequate comfort level or baseline comfort level:   Encourage patient to monitor pain and request assistance   Assess pain using appropriate pain scale   Administer analgesics based on type and severity of pain and evaluate response   Implement non-pharmacological measures as appropriate and evaluate response     Problem: Genitourinary - Adult  Goal: Urinary catheter remains patent  Outcome: Progressing  Flowsheets (Taken 2/23/2025 1042)  Urinary catheter remains patent: Assess patency of urinary catheter     
  Problem: Safety - Adult  Goal: Free from fall injury  2/24/2025 0949 by Kasia Haynes RN  Outcome: Progressing  Flowsheets (Taken 2/24/2025 0949)  Free From Fall Injury: Instruct family/caregiver on patient safety     Problem: Pain  Goal: Verbalizes/displays adequate comfort level or baseline comfort level  2/24/2025 0949 by Kasia Haynes RN  Outcome: Progressing  Flowsheets (Taken 2/24/2025 0949)  Verbalizes/displays adequate comfort level or baseline comfort level:   Encourage patient to monitor pain and request assistance   Assess pain using appropriate pain scale   Administer analgesics based on type and severity of pain and evaluate response   Implement non-pharmacological measures as appropriate and evaluate response     Problem: Infection - Adult  Goal: Absence of infection at discharge  Outcome: Progressing  Flowsheets (Taken 2/24/2025 0949)  Absence of infection at discharge:   Assess and monitor for signs and symptoms of infection   Monitor all insertion sites i.e., indwelling lines, tubes and drains   Administer medications as ordered     
  Problem: Safety - Adult  Goal: Free from fall injury  2/24/2025 2124 by Bennie Overton RN  Outcome: Progressing  2/24/2025 0949 by Kasia Haynes RN  Outcome: Progressing  Flowsheets (Taken 2/24/2025 0949)  Free From Fall Injury: Instruct family/caregiver on patient safety     Problem: Pain  Goal: Verbalizes/displays adequate comfort level or baseline comfort level  2/24/2025 2124 by Bennie Overton RN  Outcome: Progressing  2/24/2025 0949 by Kasia Haynes RN  Outcome: Progressing  Flowsheets (Taken 2/24/2025 0949)  Verbalizes/displays adequate comfort level or baseline comfort level:   Encourage patient to monitor pain and request assistance   Assess pain using appropriate pain scale   Administer analgesics based on type and severity of pain and evaluate response   Implement non-pharmacological measures as appropriate and evaluate response     Problem: Skin/Tissue Integrity  Goal: Skin integrity remains intact  Description: 1.  Monitor for areas of redness and/or skin breakdown  2.  Assess vascular access sites hourly  3.  Every 4-6 hours minimum:  Change oxygen saturation probe site  4.  Every 4-6 hours:  If on nasal continuous positive airway pressure, respiratory therapy assess nares and determine need for appliance change or resting period  Outcome: Progressing     Problem: Skin/Tissue Integrity - Adult  Goal: Skin integrity remains intact  Description: 1.  Monitor for areas of redness and/or skin breakdown  2.  Assess vascular access sites hourly  3.  Every 4-6 hours minimum:  Change oxygen saturation probe site  4.  Every 4-6 hours:  If on nasal continuous positive airway pressure, respiratory therapy assess nares and determine need for appliance change or resting period  Outcome: Progressing  Goal: Incisions, wounds, or drain sites healing without S/S of infection  Outcome: Progressing  Goal: Oral mucous membranes remain intact  Outcome: Progressing     Problem: Musculoskeletal - 
  Problem: Safety - Adult  Goal: Free from fall injury  2/25/2025 1919 by Bennie Overton RN  Outcome: Progressing  2/25/2025 1033 by Jennifer Ghosh RN  Outcome: Progressing  Flowsheets (Taken 2/24/2025 0949 by Kasia Haynes RN)  Free From Fall Injury: Instruct family/caregiver on patient safety     Problem: Pain  Goal: Verbalizes/displays adequate comfort level or baseline comfort level  2/25/2025 1919 by Bennie Overton RN  Outcome: Progressing  2/25/2025 1033 by Jennifer Ghosh RN  Outcome: Progressing  Flowsheets (Taken 2/24/2025 0949 by Kasia Haynes RN)  Verbalizes/displays adequate comfort level or baseline comfort level:   Encourage patient to monitor pain and request assistance   Assess pain using appropriate pain scale   Administer analgesics based on type and severity of pain and evaluate response   Implement non-pharmacological measures as appropriate and evaluate response     Problem: Skin/Tissue Integrity  Goal: Skin integrity remains intact  Description: 1.  Monitor for areas of redness and/or skin breakdown  2.  Assess vascular access sites hourly  3.  Every 4-6 hours minimum:  Change oxygen saturation probe site  4.  Every 4-6 hours:  If on nasal continuous positive airway pressure, respiratory therapy assess nares and determine need for appliance change or resting period  2/25/2025 1919 by Bennie Overton RN  Outcome: Progressing  2/25/2025 1033 by Jennifer Ghosh RN  Outcome: Progressing  Flowsheets (Taken 2/25/2025 1033)  Skin Integrity Remains Intact: Monitor for areas of redness and/or skin breakdown     Problem: Skin/Tissue Integrity - Adult  Goal: Skin integrity remains intact  Description: 1.  Monitor for areas of redness and/or skin breakdown  2.  Assess vascular access sites hourly  3.  Every 4-6 hours minimum:  Change oxygen saturation probe site  4.  Every 4-6 hours:  If on nasal continuous positive airway pressure, respiratory therapy assess nares and determine 
  Problem: Safety - Adult  Goal: Free from fall injury  Outcome: Progressing  Flowsheets (Taken 2/24/2025 0949 by Kasia Haynes RN)  Free From Fall Injury: Instruct family/caregiver on patient safety     Problem: Pain  Goal: Verbalizes/displays adequate comfort level or baseline comfort level  Outcome: Progressing  Flowsheets (Taken 2/24/2025 0949 by Kasia Haynes RN)  Verbalizes/displays adequate comfort level or baseline comfort level:   Encourage patient to monitor pain and request assistance   Assess pain using appropriate pain scale   Administer analgesics based on type and severity of pain and evaluate response   Implement non-pharmacological measures as appropriate and evaluate response     Problem: Skin/Tissue Integrity  Goal: Skin integrity remains intact  Description: 1.  Monitor for areas of redness and/or skin breakdown  2.  Assess vascular access sites hourly  3.  Every 4-6 hours minimum:  Change oxygen saturation probe site  4.  Every 4-6 hours:  If on nasal continuous positive airway pressure, respiratory therapy assess nares and determine need for appliance change or resting period  Outcome: Progressing  Flowsheets (Taken 2/25/2025 1033)  Skin Integrity Remains Intact: Monitor for areas of redness and/or skin breakdown     Problem: Skin/Tissue Integrity - Adult  Goal: Skin integrity remains intact  Description: 1.  Monitor for areas of redness and/or skin breakdown  2.  Assess vascular access sites hourly  3.  Every 4-6 hours minimum:  Change oxygen saturation probe site  4.  Every 4-6 hours:  If on nasal continuous positive airway pressure, respiratory therapy assess nares and determine need for appliance change or resting period  Outcome: Progressing  Flowsheets (Taken 2/25/2025 1033)  Skin Integrity Remains Intact: Monitor for areas of redness and/or skin breakdown     Problem: Musculoskeletal - Adult  Goal: Return mobility to safest level of function  Outcome: Progressing  Flowsheets (Taken 
  Problem: Safety - Adult  Goal: Free from fall injury  Outcome: Progressing  Flowsheets (Taken 2/24/2025 0949 by Kasia Haynes RN)  Free From Fall Injury: Instruct family/caregiver on patient safety     Problem: Pain  Goal: Verbalizes/displays adequate comfort level or baseline comfort level  Outcome: Progressing  Flowsheets (Taken 2/24/2025 0949 by Kasia Haynes RN)  Verbalizes/displays adequate comfort level or baseline comfort level:   Encourage patient to monitor pain and request assistance   Assess pain using appropriate pain scale   Administer analgesics based on type and severity of pain and evaluate response   Implement non-pharmacological measures as appropriate and evaluate response     Problem: Skin/Tissue Integrity  Goal: Skin integrity remains intact  Description: 1.  Monitor for areas of redness and/or skin breakdown  2.  Assess vascular access sites hourly  3.  Every 4-6 hours minimum:  Change oxygen saturation probe site  4.  Every 4-6 hours:  If on nasal continuous positive airway pressure, respiratory therapy assess nares and determine need for appliance change or resting period  Outcome: Progressing  Flowsheets (Taken 2/25/2025 1033)  Skin Integrity Remains Intact: Monitor for areas of redness and/or skin breakdown     Problem: Skin/Tissue Integrity - Adult  Goal: Skin integrity remains intact  Description: 1.  Monitor for areas of redness and/or skin breakdown  2.  Assess vascular access sites hourly  3.  Every 4-6 hours minimum:  Change oxygen saturation probe site  4.  Every 4-6 hours:  If on nasal continuous positive airway pressure, respiratory therapy assess nares and determine need for appliance change or resting period  Outcome: Progressing  Flowsheets (Taken 2/25/2025 1033)  Skin Integrity Remains Intact: Monitor for areas of redness and/or skin breakdown     Problem: Musculoskeletal - Adult  Goal: Return mobility to safest level of function  Outcome: Progressing  Flowsheets (Taken 
  Problem: Skin/Tissue Integrity  Goal: Skin integrity remains intact  Description: 1.  Monitor for areas of redness and/or skin breakdown  2.  Assess vascular access sites hourly  3.  Every 4-6 hours minimum:  Change oxygen saturation probe site  4.  Every 4-6 hours:  If on nasal continuous positive airway pressure, respiratory therapy assess nares and determine need for appliance change or resting period  Outcome: Progressing     
Increase patients ADLs/functional status to baseline.    
PT eval complete. Increase function to baseline.   
Administer IV fluids as ordered to ensure adequate hydration  2/24/2025 2124 by Bennie Overton RN  Outcome: Progressing  Goal: Maintains or returns to baseline bowel function  2/24/2025 2124 by Bennie Overton RN  Outcome: Progressing  Goal: Maintains adequate nutritional intake  2/24/2025 2124 by Bennie Overton RN  Outcome: Progressing  Goal: Establish and maintain optimal ostomy function  2/24/2025 2124 by Bennie Overton RN  Outcome: Progressing     Problem: Genitourinary - Adult  Goal: Absence of urinary retention  2/25/2025 1033 by Jennifer Ghosh RN  Outcome: Progressing  Flowsheets (Taken 2/25/2025 1033)  Absence of urinary retention:   Assess patient’s ability to void and empty bladder   Monitor intake/output and perform bladder scan as needed  2/24/2025 2124 by Bennie Overton RN  Outcome: Progressing  Goal: Urinary catheter remains patent  2/25/2025 1033 by Jennifer Ghosh RN  Outcome: Progressing  Flowsheets (Taken 2/23/2025 1042 by Kasia Haynes RN)  Urinary catheter remains patent: Assess patency of urinary catheter  2/24/2025 2124 by Bennie Overton RN  Outcome: Progressing     Problem: Infection - Adult  Goal: Absence of infection at discharge  2/25/2025 1033 by Jennifer Ghosh RN  Outcome: Progressing  Flowsheets (Taken 2/24/2025 0949 by Kasia Haynes RN)  Absence of infection at discharge:   Assess and monitor for signs and symptoms of infection   Monitor all insertion sites i.e., indwelling lines, tubes and drains   Administer medications as ordered  2/24/2025 2124 by Bennie Overton RN  Outcome: Progressing  Goal: Absence of infection during hospitalization  2/25/2025 1033 by Jennifer Ghosh RN  Outcome: Progressing  Flowsheets (Taken 2/25/2025 1033)  Absence of infection during hospitalization: Assess and monitor for signs and symptoms of infection  2/24/2025 2124 by Bennie Overton RN  Outcome: Progressing  Goal: Absence of fever/infection

## 2025-02-26 NOTE — CARE COORDINATION
CASE MANAGEMENT DISCHARGE SUMMARY      Discharge to: [facility]    Precertification completed: na  Hospital Exemption Notification (HENS) completed: na    IMM given: 2/24/25    New Durable Medical Equipment ordered/agency: none    Transportation:    Family/car:   Medical Transport explained to pt/family. Pt/family voice no agency preference.    Agency used:[facility]   time:2pm   Ambulance form completed: Yes    Confirmed discharge plan with:     Patient: yes     Family:  yes Haley     Facility/Agency, name:  [facility] faxed [phone]   Phone number for report to facility: [phone]     RN, name: Jennifer    Note: Discharging nurse to complete [facility], reconcile [facility], and place final copy with patient's discharge packet. RN to ensure that written prescriptions for  Level II medications are sent with patient to the facility as per protocol.      Rosa Jameson RN    
Case Management Assessment  Initial Evaluation    Date/Time of Evaluation: 2/21/2025 2:14 PM  Assessment Completed by: JEZ Huerta    If patient is discharged prior to next notation, then this note serves as note for discharge by case management.    Patient Name: Javier De La Torre                   YOB: 1929  Diagnosis: Cellulitis of left hand [L03.114]                   Date / Time: 2/21/2025 11:05 AM    Patient Admission Status: Inpatient   Readmission Risk (Low < 19, Mod (19-27), High > 27): Readmission Risk Score: 19.7    Current PCP: Nicholas Urrutia MD  PCP verified by CM? (P) Yes    Chart Reviewed: Yes      History Provided by: (P) Child/Family  Patient Orientation: (P) Alert and Oriented, Person    Patient Cognition: (P) Alert    Hospitalization in the last 30 days (Readmission):  No    If yes, Readmission Assessment in  Navigator will be completed.    Advance Directives:      Code Status: Prior   Patient's Primary Decision Maker is: (P) Legal Next of Kin    Primary Decision Maker: Mariel Juan - Child - [phone]    Discharge Planning:    Patient lives with: (P) Other (Comment) ([facility]) Type of Home: (P) Long-Term Care  Primary Care Giver: (P) Other (Comment) ([facility])  Patient Support Systems include: (P) Children   Current Financial resources: (P) None  Current community resources: (P) ECF/Home Care  Current services prior to admission: (P) Extended Care Facility            Current DME:              Type of Home Care services:  (P) None    ADLS  Prior functional level: (P) Assistance with the following:, Bathing, Dressing, Toileting, Cooking, Housework, Shopping, Mobility  Current functional level: (P) Assistance with the following:, Bathing, Dressing, Toileting, Housework, Shopping, Cooking, Mobility    PT AM-PAC:   /24  OT AM-PAC:   /24    Family can provide assistance at DC: (P) No  Would you like Case Management to discuss the discharge plan with any other 
Chart reviewed day 4. Spoke with Lavell OLSEN. D/c pending ID input on atbx decisions. Plan to return to LTC at [facility]. Rosa Jameson RN    
AM-PAC: 13 /24    Family can provide assistance at DC: No  Would you like Case Management to discuss the discharge plan with any other family members/significant others, and if so, who? No  Plans to Return to Present Housing: Yes  Other Identified Issues/Barriers to RETURNING to current housing: none  Potential Assistance needed at discharge: N/A            Potential DME:    Patient expects to discharge to: Long-term care  Plan for transportation at discharge: Family    Financial    Payor: [employer] / Plan: [employer] / Product Type: *No Product type* /     Does insurance require precert for SNF: No    Potential assistance Purchasing Medications: No  Meds-to-Beds request:      No Pharmacies Listed    Notes:    Factors facilitating achievement of predicted outcomes: Caregiver support, Pleasant, Sense of humor, and Has needed Durable Medical Equipment at home    Barriers to discharge: Confusion, Cognitive deficit, and Limited safety awareness    Additional Case Management Notes: spoke with patients daughter Haley. Stated she did not know patient was in hospital. Confirmed he is LTC at [facility] with plans to return. Stated his relationship with other daughter Mariel is spotty and she has health issues too and usually speak with him on the phone. Patient is pleasantly confused. Spoke with Von at [facility], no barriers tor eturn. Patietn is normally ambualtory with RW. Goes to activities. Has assistance with aDL's. Waiting for ID decisions on atbx. Will continue to follow. Rosa Jameson RN      The Plan for Transition of Care is related to the following treatment goals of Cellulitis of left hand [L03.114]  Cellulitis of left upper extremity [L03.114]  Chronic renal impairment, unspecified CKD stage [N18.9]    IF APPLICABLE: The Patient and/or patient representative Javier and his family were provided with a choice of provider and agrees with the discharge plan. Freedom of choice list with basic dialogue

## 2025-02-26 NOTE — PROGRESS NOTES
Infectious Disease Follow up Notes    CC :  LUE cellulitis      Antibiotics:  Cefazolin 1g q8     Admit Date:   2/21/2025  Hospital Day: 5    Subjective:   He remains AF   ROM has improved  He thinks the hand and arm are better    Objective:     Patient Vitals for the past 8 hrs:   BP Temp Temp src Pulse Resp SpO2   02/25/25 1546 (!) 136/107 -- -- -- -- --   02/25/25 1418 (!) 146/62 98.1 °F (36.7 °C) Oral 75 17 97 %   02/25/25 0910 (!) 143/66 98.4 °F (36.9 °C) Oral 67 18 97 %       EXAM:  Frail elderly male   Alert, conversant, NAD   Decreased edema and erythema L hand and forearm   Improved flexion   No fluctuance        LINE:     PIV in place      Scheduled Meds:   ceFAZolin  1,000 mg IntraVENous Q8H    aspirin  81 mg Oral Daily    atorvastatin  40 mg Oral Daily    hydrALAZINE  50 mg Oral TID    tamsulosin  0.4 mg Oral Daily    sodium chloride flush  10 mL IntraVENous 2 times per day    enoxaparin  30 mg SubCUTAneous Daily       Continuous Infusions:   sodium chloride            Data Review:    Lab Results   Component Value Date    WBC 6.4 02/25/2025    HGB 10.2 (L) 02/25/2025    HCT 29.8 (L) 02/25/2025    MCV 96.9 02/25/2025     02/25/2025     Lab Results   Component Value Date    CREATININE 1.7 (H) 02/25/2025    BUN 32 (H) 02/25/2025     02/25/2025    K 3.9 02/25/2025     02/25/2025    CO2 23 02/25/2025       Hepatic Function Panel:   Lab Results   Component Value Date/Time    ALKPHOS 95 02/21/2025 11:29 AM    ALT 8 02/21/2025 11:29 AM    AST 24 02/21/2025 11:29 AM    BILITOT 0.9 02/21/2025 11:29 AM       Cultures:   2/21     MRSA screen neg               BC x2 NGTD        Radiology Review:  All pertinent images / reports were reviewed as a part of this visit.      MRI L hand 2/24/25  IMPRESSION:   Diffuse soft tissue edema, most pronounced dorsally. No discrete fluid collection or abscess.   Fourth extensor tendon 
    [facility] Orthopedic Surgery  Progress Note            CHIEF COMPLAINT:  Left hand  pain and swelling/ cellulitis.       HISTORY OF PRESENT ILLNESS:    Mr. De La Torre is 89 y.o.  male right handed seen today at [facility] for f/u evaluation of a left wrist and hand swelling and pain with no know injury which started a week ago.  He is still complaining of left wrist pain. This is better with elevation and worse with ROM. No numbness or tingling sensation. No other complaint.     Past Medical History:        Diagnosis Date    Hyperlipidemia     Hypertension     Urinary retention        Past Surgical History:        Procedure Laterality Date    CARDIAC SURGERY  2016    open heart       Medications prior to admission:   Prior to Admission medications    Medication Sig Start Date End Date Taking? Authorizing Provider   linezolid (ZYVOX) 600 MG tablet Take 1 tablet by mouth 2 times daily   Yes Aleksandr Amaya MD   ondansetron (ZOFRAN) 4 MG tablet Take 1 tablet by mouth every 4 hours as needed for Nausea or Vomiting   Yes Aleksandr Amaya MD   acetaminophen (TYLENOL) 325 MG tablet Take 2 tablets by mouth every 6 hours as needed for Pain   Yes Aleksandr Amaya MD   hydrALAZINE (APRESOLINE) 50 MG tablet Take 1 tablet by mouth 3 times daily 11/29/24  Yes Lavell Villa APRN - CNP   tamsulosin (FLOMAX) 0.4 MG capsule Take 1 capsule by mouth daily   Yes Aleksandr Amaya MD   aspirin 81 MG tablet Take 1 tablet by mouth daily   Yes Aleksandr Amaya MD   atorvastatin (LIPITOR) 40 MG tablet Take 1 tablet by mouth daily   Yes Aleksandr Amaya MD       Current Medications:   Current Facility-Administered Medications: vancomycin (VANCOCIN) 1250 mg in 250 mL IVPB, 1,250 mg, IntraVENous, Once  aspirin EC tablet 81 mg, 81 mg, Oral, Daily  atorvastatin (LIPITOR) tablet 40 mg, 40 mg, Oral, Daily  hydrALAZINE (APRESOLINE) tablet 50 mg, 50 mg, Oral, TID  tamsulosin (FLOMAX) capsule 0.4 mg, 0.4 mg, Oral, 
  Department of Orthopedic Surgery  Physician Assistant   Progress Note    Subjective:       Systemic or Specific Complaints:improvement in the hand, still not able to fully close the fist.     Objective:     Patient Vitals for the past 24 hrs:   BP Temp Temp src Pulse Resp SpO2   02/24/25 0900 (!) 145/65 98.2 °F (36.8 °C) Oral 75 16 98 %   02/24/25 0824 130/75 -- -- 81 -- 93 %   02/23/25 2137 (!) 151/71 97.7 °F (36.5 °C) -- 62 16 96 %   02/23/25 1515 (!) 138/58 98.1 °F (36.7 °C) Oral 66 16 99 %   02/23/25 1323 136/68 -- -- 73 -- 96 %       General: alert, appears stated age, cooperative, and no distress   Wound: No wound appreciated, skin wrinkling present, edema improved, erythema has darkened   Motion: Painful range of Motion in affected extremity with finger flexion   DVT Exam: No evidence of DVT seen on physical exam.     Additional exam: Patient seen sitting in bed at time of interview  Sensation intact to light touch.  Radial pulse 2+  Nontender to palpation about the thenar eminence or the dorsum of the hand/wrist      Data Review  CBC:   Lab Results   Component Value Date/Time    WBC 6.2 02/24/2025 05:07 AM    RBC 3.01 02/24/2025 05:07 AM    HGB 10.0 02/24/2025 05:07 AM    HCT 28.9 02/24/2025 05:07 AM     02/24/2025 05:07 AM       Renal:   Lab Results   Component Value Date/Time     02/24/2025 05:07 AM    K 3.9 02/24/2025 05:07 AM     02/24/2025 05:07 AM    CO2 22 02/24/2025 05:07 AM    BUN 36 02/24/2025 05:07 AM    CREATININE 1.9 02/24/2025 05:07 AM    GLUCOSE 100 02/24/2025 05:07 AM    CALCIUM 8.3 02/24/2025 05:07 AM      IMAGING: Xray's were reviewed, taken 2/21/2025 in the ED, 3 views of the left  wrist, and showed no fracture, soft tissue swelling.      CT scan left hand dated 2/21/2025 was reviewed and showed;     1. Diffuse soft tissue swelling without discrete abscess formation could be identified.  2. No acute fracture or dislocation.  3. First carpal metacarpal advanced 
  [facility] Medicine Progress Note  V 1.6      Date of Admission: 2/21/2025    Hospital Day: 2      Chief Admission Complaint:    Chief Complaint   Patient presents with    Hand Pain     Brought by EMS from [facility] for left arm swelling for weeks, noted with redness and warm to touch. Denies any history of trauma     Subjective:    Pt states that his hand hurts today, we are awaiting MRI. He is more forgetful today.     Presenting Admission History:       89 y.o. male who presented to [facility] with left hand pain.  PMHx significant for HTN, HLD, urinary retention.  History was obtained from the patient and review of the EMR.  The patient states that over the past week, he has been having left hand swelling.  It has progressively worsened over the past few days.  He has been taking antibiotics at his facility for this (linezolid) over the past few days due to concern for cellulitis.  As it kept getting worse, they decided to bring him to the hospital for further evaluation.     Left hand x-ray with generalized soft tissue swelling.  Sedimentation rate of 12, CRP 62.1, uric acid 8.5.  The patient states that he has had what was thought to be gout in the past though only in one of his feet.  He states that he was given medication and it went away and has never had issues since.  He will be admitted for further evaluation.     I discussed CODE STATUS with the patient and he confirms FULL CODE.     Assessment/Plan:      Current Principal Problem:  Cellulitis of left upper extremity    Left hand swelling with erythema, unclear etiology at this point. Ddx: cellulitis, gout flare, rheumatoid arthritis  - Pt states that he was on an antibiotic (zyvox x 2 doses) as outpatient and it did not improve  - Left hand x-ray with generalized soft tissue swelling  - CT left hand with diffuse soft tissue swelling without discrete abscess formation. No acute fracture or dislocation. First carpal metacarpal 
  [facility] Medicine Progress Note  V 1.6      Date of Admission: 2/21/2025    Hospital Day: 5      Chief Admission Complaint:    Chief Complaint   Patient presents with    Hand Pain     Brought by EMS from [facility] for left arm swelling for weeks, noted with redness and warm to touch. Denies any history of trauma     Subjective:      Left hand pain/edema improving, improved ROM    Presenting Admission History:       \"89 y.o. male who presented to [facility] with left hand pain.  PMHx significant for HTN, HLD, urinary retention.  History was obtained from the patient and review of the EMR.  The patient states that over the past week, he has been having left hand swelling.  It has progressively worsened over the past few days.  He has been taking antibiotics at his facility for this (linezolid) over the past few days due to concern for cellulitis.  As it kept getting worse, they decided to bring him to the hospital for further evaluation.     Left hand x-ray with generalized soft tissue swelling.  Sedimentation rate of 12, CRP 62.1, uric acid 8.5.  The patient states that he has had what was thought to be gout in the past though only in one of his feet.  He states that he was given medication and it went away and has never had issues since.  He will be admitted for further evaluation.     I discussed CODE STATUS with the patient and he confirms FULL CODE. \"    Assessment/Plan:      Current Principal Problem:  Cellulitis of left upper extremity    Left hand cellulitis  - Pt states that he was on an antibiotic (zyvox x 2 doses) as outpatient and it did not improve  - Left hand x-ray with generalized soft tissue swelling  - CT left hand with diffuse soft tissue swelling without discrete abscess formation. No acute fracture or dislocation. First carpal metacarpal advanced OA  - MRI left hand: No discrete fluid collection or abscess. Fourth extensor tendon compartment tenosynovitis. Multifocal 
.  4 Eyes Skin Assessment and Patient belongings     The patient is being assess for  Admission    I agree that 2 Nurses have performed a thorough Head to Toe Skin Assessment on the patient. ALL assessment sites listed below have been assessed.       Areas assessed by both nurses: Sophia RN and Eun VELASQUEZ  [x]   Head, Face, and Ears   [x]   Shoulders, Back, and Chest  [x]   Arms, Elbows, and Hands   [x]   Coccyx, Sacrum, and IschIum  [x]   Legs, Feet, and Heels        Does the Patient have Skin Breakdown?  Yes LDA WOUND CARE was Initiated documentation include the Luci-wound, Wound Assessment, Measurements, Dressing Treatment, Drainage, and Color\",         Austin Prevention initiated:  Yes   Wound Care Orders initiated:  Yes      WO nurse consulted for Pressure Injury (Stage 3,4, Unstageable, DTI, NWPT, and Complex wounds), New and Established Ostomies:  No      I agree that 2 Nurses have reviewed patient belongings with the patient/family and documented in the flowsheet upon admission or transfer to the unit.             Nurse 1 eSignature: Electronically signed by Sophia Andres RN on 2/21/25 at 5:14 PM EST    **SHARE this note so that the co-signing nurse is able to place an eSignature**    Nurse 2 eSignature: Electronically signed by Eun Muir RN on 2/21/25 at 5:38 PM EST   
Assessment completed and documented. VSS. Only oriented to himself. Reacts aggressive towards pca when trying to turn and ferrell care. He is not tolerating elevation of his arm. States he want to sleep and to leave him alone.  Bed locked and in lowest position. Bedside table and call light within reach.   
Attempted to call patient's daughters to complete MRI questionnaire. Unable to reach. Voicemail left to return call. Electronically signed by MERCEDEZ DICKENS RN on 2/22/25 at 12:00 PM EST    
Attempted to call pts daughters to complete MRI questionnaire. Unable to reach. Electronically signed by MERCEDEZ DICKENS RN on 2/22/25 at 11:16 AM EST    
Occupational Therapy  Facility/Department: [facility] C3 TELE/MED SURG/ONC  Occupational Therapy Initial Assessment    Name: Javier De La Torre  : 1929  MRN: [id number]  Date of Service: 2025    Discharge Recommendations:  Subacute/Skilled Nursing Facility  OT Equipment Recommendations  Equipment Needed: No     Therapy discharge recommendations are subject to collaboration from the patient’s interdisciplinary healthcare team, including MD and case management recommendations.    Barriers to Home Discharge:   [] Steps to access home entry or bed/bath:   [x] Unable to transfer, ambulate, or propel wheelchair household distances without assist   [] Limited available assist at home upon discharge    [x] Patient or family requests d/c to post-acute facility    [x] Poor cognition/safety awareness for d/c to home alone    [] Unable to maintain ordered weight bearing status    [] Patient with salient signs of long-standing immobility   [x] Decreased independence with ADLs   [x] Increased risk for falls   [] Other:    If pt is unable to be seen after this session, please let this note serve as discharge summary.  Please see case management note for discharge disposition.  Thank you.    Patient Diagnosis(es): The primary encounter diagnosis was Cellulitis of left upper extremity. A diagnosis of Chronic renal impairment, unspecified CKD stage was also pertinent to this visit.  Past Medical History:  has a past medical history of Hyperlipidemia, Hypertension, and Urinary retention.  Past Surgical History:  has a past surgical history that includes Cardiac surgery (2016).           Assessment  Performance deficits / Impairments: Decreased functional mobility ;Decreased endurance;Decreased coordination;Decreased ADL status;Decreased posture;Decreased balance;Decreased strength;Decreased high-level IADLs;Decreased safe awareness;Decreased cognition;Decreased ROM;Decreased fine motor control  Assessment: Patient agreeable to OT 
Patient alert and oriented to self and place. Orientation waxes and wanes. Medicated for c/o nausea per MAR with good effect. Lt arm remains swollen and elevated on pillows. Remains in IV ATB with no side effects noted. Safety measures remain in place. Call light in reach. Denies any needs at this time. Electronically signed by MERCEDEZ DICKENS RN on 2/23/25 at 1:40 PM EST    
Patient alert and verbal. Oriented to self and place at this time. Orientation waxes and wanes. Pt able to turn self in bed. LUE remains swollen. Elevated on pillows and ace wrap applied. Pt turns self in the bed. Medicated for c/o lt arm pain per MAR with good effect. Safety measures remain in place. Call light in reach. Denies any needs at this time. Electronically signed by MERCEDEZ DICKENS RN on 2/22/25 at 3:55 PM EST    
Physical Therapy  Facility/Department: [facility] C3 TELE/MED SURG/ONC  Daily Treatment Note  NAME: Javier De La Torre  : 1929  MRN: [id number]    Date of Service: 2025    Discharge Recommendations:  Long Term Care with PT   PT Equipment Recommendations  Equipment Needed: No    Patient Diagnosis(es): The primary encounter diagnosis was Cellulitis of left upper extremity. A diagnosis of Chronic renal impairment, unspecified CKD stage was also pertinent to this visit.    Assessment  Assessment: Pt demos bed sba mobility and CGA for transfers and CGA/min A gait with RW. Pt able to ambulate 300' with RW with no overt LOB noted. Pt demonstrates decreased safety awareness and impulsivity. Frequent VC needed for safety throughout session with minimal carryover noted. Recommend LTC with PT at [facility]. Pt would benefit from skilled PT services to address current functional deficits.  Activity Tolerance: Patient tolerated treatment well;Treatment limited secondary to decreased cognition  Equipment Needed: No    Plan  Physical Therapy Plan  General Plan: 3-5 times per week  Current Treatment Recommendations: Strengthening;ROM;Balance training;Functional mobility training;Transfer training;Endurance training;Gait training;Home exercise program;Safety education & training;Patient/Caregiver education & training;Therapeutic activities    Restrictions  Restrictions/Precautions  Restrictions/Precautions: General Precautions  Activity Level: Up with Assist  Position Activity Restriction  Other Position/Activity Restrictions: IV, ferrell; avoid heavy impact activities     Subjective   Subjective  Subjective: Pt in bed upon arrival. agreeable to PT session, sheeba. cleared  Pain: Soreness L hand not rated    Objective  Vitals     Bed Mobility Training  Bed Mobility Training: Yes  Interventions: Verbal cues;Safety awareness training  Supine to Sit: Stand by assistance  Scooting: Stand by assistance (EOB)  Balance  Sitting: Intact  Standing: 
Pt a/o to himself. Receives pain medication via mar. Refuses to be turned. Reminded him to elevate arm. Ace wrap has been removed due to more swelling in hands and fingers.   Assessment completed and documented. Bed locked and in lowest position. Bedside table and call light within reach. Denies further needs at this time.  
Pt a/o to self and place only. VSS. All prescriptions, discharge and follow up instructions given to the patient.  All belongings collected and sent with the patient. The patient was discharged off the unit by transport in stable condition.   
Pt arrived to c3. Orientated to room and surroundings. Call light within reach.  
Pt assessment completed and charted. VSS. Pt a/o to self and place. PRN medication given per MAR for constipation.  Bed in lowest position and wheels locked. Call light within reach. Bedside table within reach. Non-skid socks in place. Pt denies any other needs at this time.  Pt calls out appropriately.  
Pt assessment completed and charted. VSS. Pt a/o to self and place. Pt denies any pain. Bed in lowest position and wheels locked. Call light within reach. Bedside table within reach. Non-skid socks in place. Pt denies any other needs at this time.  Pt calls out appropriately.  
Pt not tolerating his arm being elevated on a pole. Pt is very uncomfortable. Pt is agree able to have it up for a little while but cannot tolerate long hours.Sophia Andres RN   
Pt off unit at this time to MRI. Electronically signed by MERCEDEZ DICKENS RN on 2/24/25 at 7:14 AM EST    
Spoke with patient's daughter, Mariel, and completed MRI questionnaire. Electronically signed by MERCEDEZ DICKENS RN on 2/23/25 at 4:08 PM EST    
Connections: Not on file   Intimate Partner Violence: Not on file   Housing Stability: Low Risk  (2/21/2025)    Housing Stability Vital Sign     Unable to Pay for Housing in the Last Year: No     Number of Times Moved in the Last Year: 0     Homeless in the Last Year: No       Family History:  History reviewed. No pertinent family history.       REVIEW OF SYSTEMS:   CONSTITUTIONAL: Denies unexplained weight loss, fevers, chills or fatigue  NEUROLOGICAL: Denies unsteady gait or progressive weakness    PSYCHOLOGICAL: Denies anxiety, depression   SKIN: Denies skin changes, delayed healing, rash, itching   HEMATOLOGIC: Denies easy bleeding or bruising  ENDOCRINE: Denies excessive thirst, urination, heat/cold  RESPIRATORY: Denies current dyspnea, cough  CARDIOVASCULAR: Negative for chest pain at this time.  EYES: Negative for photophobia and visual disturbance.   ENT:  Negative for rhinorrhea, epistaxis, sore throat, or hearing loss.  GI: Denies nausea, vomiting, diarrhea   : Denies bowel or bladder issues   MUSCULOSKELETAL: Left hand pain.  All other ROS reviewed in chart or with patient or family and are grossly negative.       PHYSICAL EXAMINATION:  Mr. De La Torre is a very pleasant 89 y.o. male who seen today in no acute distress, awake, alert, and oriented.  He is well nourished and groomed.  Patient with normal affect. Body mass index is 19.33 kg/m².. Skin warm and dry. Resting respiratory rate is 16.  Resp deep and easy. Pulse is with regular rate and rhythm    BP (!) 140/69   Pulse 75   Temp 98.2 °F (36.8 °C) (Oral)   Resp 16   Ht 1.829 m (6')   Wt 64.6 kg (142 lb 8 oz)   SpO2 96%   BMI 19.33 kg/m²        Airway is intact  Chest: chest clear, no wheezing, rales, normal symmetric air entry, no tachypnea, retractions or cyanosis  Heart: regular rate and rhythm ; heart sounds normal   Hearing intact, pupil equal and reactive bilateral  Lymphatics; No groin or axillary enlarged lymph nodes.  Neck; No 
training/pressure relief  Base of Support: Widened  Speed/Alisson: Accelerated  Step Length: Left shortened;Right shortened  Gait Abnormalities: Decreased step clearance;Trunk sway increased      AM-PAC - Mobility    AM-PAC Basic Mobility - Inpatient   How much help is needed turning from your back to your side while in a flat bed without using bedrails?: A Little  How much help is needed moving from lying on your back to sitting on the side of a flat bed without using bedrails?: A Little  How much help is needed moving to and from a bed to a chair?: A Little  How much help is needed standing up from a chair using your arms?: A Little  How much help is needed walking in hospital room?: A Little  How much help is needed climbing 3-5 steps with a railing?: A Lot  AM-PAC Inpatient Mobility Raw Score : 17  AM-PAC Inpatient T-Scale Score : 42.13  Mobility Inpatient CMS 0-100% Score: 50.57  Mobility Inpatient CMS G-Code Modifier : CK      Goals  Short Term Goals  Time Frame for Short Term Goals: 3/3/25  Short Term Goal 1: Pt will perform bed mobility IND  Short Term Goal 2: Pt will perform all transfers with supervision and LRAD  Short Term Goal 3: Pt will ambulate 250' with SBA and LRAD  Short Term Goal 4: Pt will perform 12-15 reps of BLE exercises by 2/28  Patient Goals   Patient Goals : \"to be able to close my hand\"       Education  Patient Education  Education Given To: Patient  Education Provided: Role of Therapy;Plan of Care;Transfer Training;Mobility Training  Education Method: Verbal  Barriers to Learning: Cognition  Education Outcome: Verbalized understanding;Continued education needed      Therapy Time   Individual Concurrent Group Co-treatment   Time In 0823         Time Out 0900         Minutes 37         Timed Code Treatment Minutes: 27 Minutes (10 min eval)       GIGI Perez     If pt is unable to be seen after this session, please let this note serve as discharge summary.  Please see case 
routinely done. No further work up.       Blood Cultures:   Lab Results   Component Value Date/Time    BC  02/21/2025 05:13 PM     No Growth to date.  Any change in status will be called.     Lab Results   Component Value Date/Time    BLOODCULT2  02/21/2025 05:16 PM     No Growth to date.  Any change in status will be called.     Organism:   Lab Results   Component Value Date/Time    ORG Aerococcus species 11/21/2024 05:39 PM         Lavell Villa, GEENA - CNP    
to date.  Any change in status will be called.     Lab Results   Component Value Date/Time    BLOODCULT2  02/21/2025 05:16 PM     No Growth to date.  Any change in status will be called.     Organism:   Lab Results   Component Value Date/Time    ORG Aerococcus species 11/21/2024 05:39 PM         GEENA Loomis - CNP